# Patient Record
Sex: MALE | Race: WHITE | NOT HISPANIC OR LATINO | Employment: OTHER | URBAN - METROPOLITAN AREA
[De-identification: names, ages, dates, MRNs, and addresses within clinical notes are randomized per-mention and may not be internally consistent; named-entity substitution may affect disease eponyms.]

---

## 2024-01-01 ENCOUNTER — APPOINTMENT (OUTPATIENT)
Dept: CARDIOLOGY | Facility: HOSPITAL | Age: 89
DRG: 871 | End: 2024-01-01
Payer: MEDICARE

## 2024-01-01 ENCOUNTER — APPOINTMENT (OUTPATIENT)
Dept: RADIOLOGY | Facility: HOSPITAL | Age: 89
DRG: 178 | End: 2024-01-01
Payer: MEDICARE

## 2024-01-01 ENCOUNTER — HOSPITAL ENCOUNTER (INPATIENT)
Facility: HOSPITAL | Age: 89
LOS: 5 days | DRG: 871 | End: 2024-06-03
Attending: STUDENT IN AN ORGANIZED HEALTH CARE EDUCATION/TRAINING PROGRAM | Admitting: INTERNAL MEDICINE
Payer: MEDICARE

## 2024-01-01 ENCOUNTER — APPOINTMENT (OUTPATIENT)
Dept: RADIOLOGY | Facility: HOSPITAL | Age: 89
DRG: 871 | End: 2024-01-01
Payer: MEDICARE

## 2024-01-01 ENCOUNTER — HOSPITAL ENCOUNTER (INPATIENT)
Facility: HOSPITAL | Age: 89
LOS: 7 days | Discharge: HOME | DRG: 178 | End: 2024-05-14
Attending: STUDENT IN AN ORGANIZED HEALTH CARE EDUCATION/TRAINING PROGRAM | Admitting: INTERNAL MEDICINE
Payer: MEDICARE

## 2024-01-01 VITALS
RESPIRATION RATE: 40 BRPM | WEIGHT: 175.49 LBS | DIASTOLIC BLOOD PRESSURE: 30 MMHG | HEART RATE: 55 BPM | SYSTOLIC BLOOD PRESSURE: 67 MMHG | BODY MASS INDEX: 25.12 KG/M2 | HEIGHT: 70 IN | TEMPERATURE: 97.9 F | OXYGEN SATURATION: 85 %

## 2024-01-01 VITALS
TEMPERATURE: 97.5 F | HEART RATE: 81 BPM | RESPIRATION RATE: 16 BRPM | OXYGEN SATURATION: 93 % | DIASTOLIC BLOOD PRESSURE: 66 MMHG | WEIGHT: 180 LBS | SYSTOLIC BLOOD PRESSURE: 139 MMHG | HEIGHT: 70 IN | BODY MASS INDEX: 25.77 KG/M2
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
## 2024-01-01 DIAGNOSIS — J94.2 HEMOTHORAX ON LEFT: ICD-10-CM

## 2024-01-01 DIAGNOSIS — J18.9 PNEUMONIA OF BOTH LUNGS DUE TO INFECTIOUS ORGANISM, UNSPECIFIED PART OF LUNG: ICD-10-CM

## 2024-01-01 DIAGNOSIS — E87.1 HYPONATREMIA: ICD-10-CM

## 2024-01-01 DIAGNOSIS — A41.9 SEPSIS, DUE TO UNSPECIFIED ORGANISM, UNSPECIFIED WHETHER ACUTE ORGAN DYSFUNCTION PRESENT (MULTI): ICD-10-CM

## 2024-01-01 DIAGNOSIS — J96.01 ACUTE HYPOXIC RESPIRATORY FAILURE (MULTI): ICD-10-CM

## 2024-01-01 DIAGNOSIS — J18.9 PNEUMONIA DUE TO INFECTIOUS ORGANISM, UNSPECIFIED LATERALITY, UNSPECIFIED PART OF LUNG: ICD-10-CM

## 2024-01-01 DIAGNOSIS — R06.02 SHORTNESS OF BREATH: Primary | ICD-10-CM

## 2024-01-01 DIAGNOSIS — E22.2: Primary | ICD-10-CM

## 2024-01-01 LAB
ABO GROUP (TYPE) IN BLOOD: NORMAL
ABO GROUP (TYPE) IN BLOOD: NORMAL
ADENOVIRUS RVP, VIRC: NOT DETECTED
ALBUMIN SERPL BCP-MCNC: 2.7 G/DL (ref 3.4–5)
ALBUMIN SERPL BCP-MCNC: 2.7 G/DL (ref 3.4–5)
ALBUMIN SERPL BCP-MCNC: 2.8 G/DL (ref 3.4–5)
ALBUMIN SERPL BCP-MCNC: 2.9 G/DL (ref 3.4–5)
ALBUMIN SERPL BCP-MCNC: 3.1 G/DL (ref 3.4–5)
ALBUMIN SERPL BCP-MCNC: 3.2 G/DL (ref 3.4–5)
ALBUMIN SERPL BCP-MCNC: 3.3 G/DL (ref 3.4–5)
ALBUMIN SERPL BCP-MCNC: 3.4 G/DL (ref 3.4–5)
ALBUMIN SERPL BCP-MCNC: 3.6 G/DL (ref 3.4–5)
ALBUMIN: 3.3 G/DL (ref 3.4–5)
ALP SERPL-CCNC: 100 U/L (ref 33–136)
ALP SERPL-CCNC: 101 U/L (ref 33–136)
ALP SERPL-CCNC: 103 U/L (ref 33–136)
ALP SERPL-CCNC: 90 U/L (ref 33–136)
ALP SERPL-CCNC: 91 U/L (ref 33–136)
ALP SERPL-CCNC: 92 U/L (ref 33–136)
ALP SERPL-CCNC: 96 U/L (ref 33–136)
ALP SERPL-CCNC: 97 U/L (ref 33–136)
ALP SERPL-CCNC: 99 U/L (ref 33–136)
ALPHA 1 GLOBULIN: 0.5 G/DL (ref 0.2–0.6)
ALPHA 2 GLOBULIN: 1.2 G/DL (ref 0.4–1.1)
ALT SERPL W P-5'-P-CCNC: 14 U/L (ref 10–52)
ALT SERPL W P-5'-P-CCNC: 16 U/L (ref 10–52)
ALT SERPL W P-5'-P-CCNC: 20 U/L (ref 10–52)
ALT SERPL W P-5'-P-CCNC: 22 U/L (ref 10–52)
ALT SERPL W P-5'-P-CCNC: 25 U/L (ref 10–52)
ALT SERPL W P-5'-P-CCNC: 27 U/L (ref 10–52)
ALT SERPL W P-5'-P-CCNC: 28 U/L (ref 10–52)
ALT SERPL W P-5'-P-CCNC: 29 U/L (ref 10–52)
ALT SERPL W P-5'-P-CCNC: 29 U/L (ref 10–52)
ANION GAP BLDA CALCULATED.4IONS-SCNC: 29 MMO/L (ref 10–25)
ANION GAP SERPL CALC-SCNC: 13 MMOL/L (ref 10–20)
ANION GAP SERPL CALC-SCNC: 15 MMOL/L (ref 10–20)
ANION GAP SERPL CALC-SCNC: 16 MMOL/L (ref 10–20)
ANION GAP SERPL CALC-SCNC: 17 MMOL/L (ref 10–20)
ANION GAP SERPL CALC-SCNC: 18 MMOL/L (ref 10–20)
ANION GAP SERPL CALC-SCNC: 20 MMOL/L (ref 10–20)
ANION GAP SERPL CALC-SCNC: 26 MMOL/L (ref 10–20)
ANTIBODY SCREEN: NORMAL
ANTIBODY SCREEN: NORMAL
APPARATUS: ABNORMAL
APPEARANCE UR: CLEAR
APPEARANCE UR: CLEAR
APTT PPP: 26 SECONDS (ref 27–38)
ARTERIAL PATENCY WRIST A: POSITIVE
AST SERPL W P-5'-P-CCNC: 35 U/L (ref 9–39)
AST SERPL W P-5'-P-CCNC: 38 U/L (ref 9–39)
AST SERPL W P-5'-P-CCNC: 39 U/L (ref 9–39)
AST SERPL W P-5'-P-CCNC: 41 U/L (ref 9–39)
AST SERPL W P-5'-P-CCNC: 41 U/L (ref 9–39)
AST SERPL W P-5'-P-CCNC: 43 U/L (ref 9–39)
AST SERPL W P-5'-P-CCNC: 43 U/L (ref 9–39)
AST SERPL W P-5'-P-CCNC: 45 U/L (ref 9–39)
AST SERPL W P-5'-P-CCNC: 46 U/L (ref 9–39)
BACTERIA BLD CULT: NORMAL
BACTERIA BLD CULT: NORMAL
BACTERIA FLD CULT: NORMAL
BASE EXCESS BLDA CALC-SCNC: -7.1 MMOL/L (ref -2–3)
BASOPHILS # BLD AUTO: 0.05 X10*3/UL (ref 0–0.1)
BASOPHILS # BLD MANUAL: 0 X10*3/UL (ref 0–0.1)
BASOPHILS # BLD MANUAL: 0 X10*3/UL (ref 0–0.1)
BASOPHILS NFR BLD AUTO: 0.5 %
BASOPHILS NFR BLD MANUAL: 0 %
BASOPHILS NFR BLD MANUAL: 0 %
BASOPHILS NFR FLD MANUAL: 0 %
BETA GLOBULIN: 0.7 G/DL (ref 0.5–1.2)
BILIRUB SERPL-MCNC: 0.4 MG/DL (ref 0–1.2)
BILIRUB SERPL-MCNC: 0.5 MG/DL (ref 0–1.2)
BILIRUB SERPL-MCNC: 0.6 MG/DL (ref 0–1.2)
BILIRUB SERPL-MCNC: 0.6 MG/DL (ref 0–1.2)
BILIRUB SERPL-MCNC: 0.7 MG/DL (ref 0–1.2)
BILIRUB SERPL-MCNC: 0.8 MG/DL (ref 0–1.2)
BILIRUB SERPL-MCNC: 0.9 MG/DL (ref 0–1.2)
BILIRUB SERPL-MCNC: 0.9 MG/DL (ref 0–1.2)
BILIRUB SERPL-MCNC: 1 MG/DL (ref 0–1.2)
BILIRUB UR STRIP.AUTO-MCNC: NEGATIVE MG/DL
BILIRUB UR STRIP.AUTO-MCNC: NEGATIVE MG/DL
BLASTS NFR FLD MANUAL: 0 %
BLOOD EXPIRATION DATE: NORMAL
BNP SERPL-MCNC: 73 PG/ML (ref 0–99)
BODY TEMPERATURE: 37 DEGREES CELSIUS
BUN SERPL-MCNC: 19 MG/DL (ref 6–23)
BUN SERPL-MCNC: 21 MG/DL (ref 6–23)
BUN SERPL-MCNC: 24 MG/DL (ref 6–23)
BUN SERPL-MCNC: 24 MG/DL (ref 6–23)
BUN SERPL-MCNC: 28 MG/DL (ref 6–23)
BUN SERPL-MCNC: 30 MG/DL (ref 6–23)
BUN SERPL-MCNC: 33 MG/DL (ref 6–23)
BUN SERPL-MCNC: 75 MG/DL (ref 6–23)
BUN SERPL-MCNC: 76 MG/DL (ref 6–23)
BUN SERPL-MCNC: 81 MG/DL (ref 6–23)
BUN SERPL-MCNC: 81 MG/DL (ref 6–23)
BUN SERPL-MCNC: 84 MG/DL (ref 6–23)
BUN SERPL-MCNC: 87 MG/DL (ref 6–23)
BUN SERPL-MCNC: 92 MG/DL (ref 6–23)
BURR CELLS BLD QL SMEAR: ABNORMAL
BURR CELLS BLD QL SMEAR: ABNORMAL
CA-I BLDA-SCNC: 1.24 MMOL/L (ref 1.1–1.33)
CALCIUM SERPL-MCNC: 10 MG/DL (ref 8.6–10.3)
CALCIUM SERPL-MCNC: 8.5 MG/DL (ref 8.6–10.3)
CALCIUM SERPL-MCNC: 8.6 MG/DL (ref 8.6–10.3)
CALCIUM SERPL-MCNC: 8.8 MG/DL (ref 8.6–10.3)
CALCIUM SERPL-MCNC: 8.9 MG/DL (ref 8.6–10.3)
CALCIUM SERPL-MCNC: 9 MG/DL (ref 8.6–10.3)
CALCIUM SERPL-MCNC: 9.2 MG/DL (ref 8.6–10.3)
CALCIUM SERPL-MCNC: 9.3 MG/DL (ref 8.6–10.3)
CALCIUM SERPL-MCNC: 9.6 MG/DL (ref 8.6–10.3)
CALCIUM SERPL-MCNC: 9.7 MG/DL (ref 8.6–10.3)
CARDIAC TROPONIN I PNL SERPL HS: 53 NG/L (ref 0–20)
CARDIAC TROPONIN I PNL SERPL HS: 61 NG/L (ref 0–20)
CARDIAC TROPONIN I PNL SERPL HS: 69 NG/L (ref 0–20)
CHLORIDE BLDA-SCNC: 102 MMOL/L (ref 98–107)
CHLORIDE SERPL-SCNC: 100 MMOL/L (ref 98–107)
CHLORIDE SERPL-SCNC: 100 MMOL/L (ref 98–107)
CHLORIDE SERPL-SCNC: 101 MMOL/L (ref 98–107)
CHLORIDE SERPL-SCNC: 104 MMOL/L (ref 98–107)
CHLORIDE SERPL-SCNC: 105 MMOL/L (ref 98–107)
CHLORIDE SERPL-SCNC: 106 MMOL/L (ref 98–107)
CHLORIDE SERPL-SCNC: 107 MMOL/L (ref 98–107)
CHLORIDE SERPL-SCNC: 108 MMOL/L (ref 98–107)
CHLORIDE SERPL-SCNC: 109 MMOL/L (ref 98–107)
CHLORIDE SERPL-SCNC: 110 MMOL/L (ref 98–107)
CHLORIDE SERPL-SCNC: 112 MMOL/L (ref 98–107)
CHLORIDE SERPL-SCNC: 114 MMOL/L (ref 98–107)
CHLORIDE SERPL-SCNC: 116 MMOL/L (ref 98–107)
CHLORIDE SERPL-SCNC: 96 MMOL/L (ref 98–107)
CHLORIDE SERPL-SCNC: 96 MMOL/L (ref 98–107)
CHLORIDE SERPL-SCNC: 98 MMOL/L (ref 98–107)
CHLORIDE UR-SCNC: 52 MMOL/L
CHLORIDE UR-SCNC: <15 MMOL/L
CHLORIDE/CREATININE (MMOL/G) IN URINE: 81 MMOL/G CREAT (ref 23–275)
CHLORIDE/CREATININE (MMOL/G) IN URINE: NORMAL
CLARITY FLD: ABNORMAL
CO2 SERPL-SCNC: 18 MMOL/L (ref 21–32)
CO2 SERPL-SCNC: 18 MMOL/L (ref 21–32)
CO2 SERPL-SCNC: 19 MMOL/L (ref 21–32)
CO2 SERPL-SCNC: 20 MMOL/L (ref 21–32)
CO2 SERPL-SCNC: 20 MMOL/L (ref 21–32)
CO2 SERPL-SCNC: 21 MMOL/L (ref 21–32)
CO2 SERPL-SCNC: 22 MMOL/L (ref 21–32)
CO2 SERPL-SCNC: 23 MMOL/L (ref 21–32)
COLOR FLD: ABNORMAL
COLOR UR: ABNORMAL
COLOR UR: YELLOW
CORTIS AM PEAK SERPL-MSCNC: 29.9 UG/DL (ref 5–20)
CREAT SERPL-MCNC: 0.61 MG/DL (ref 0.5–1.3)
CREAT SERPL-MCNC: 0.62 MG/DL (ref 0.5–1.3)
CREAT SERPL-MCNC: 0.71 MG/DL (ref 0.5–1.3)
CREAT SERPL-MCNC: 0.73 MG/DL (ref 0.5–1.3)
CREAT SERPL-MCNC: 0.74 MG/DL (ref 0.5–1.3)
CREAT SERPL-MCNC: 0.79 MG/DL (ref 0.5–1.3)
CREAT SERPL-MCNC: 0.83 MG/DL (ref 0.5–1.3)
CREAT SERPL-MCNC: 0.93 MG/DL (ref 0.5–1.3)
CREAT SERPL-MCNC: 0.94 MG/DL (ref 0.5–1.3)
CREAT SERPL-MCNC: 1.21 MG/DL (ref 0.5–1.3)
CREAT SERPL-MCNC: 1.42 MG/DL (ref 0.5–1.3)
CREAT SERPL-MCNC: 1.43 MG/DL (ref 0.5–1.3)
CREAT SERPL-MCNC: 1.45 MG/DL (ref 0.5–1.3)
CREAT SERPL-MCNC: 1.59 MG/DL (ref 0.5–1.3)
CREAT SERPL-MCNC: 1.65 MG/DL (ref 0.5–1.3)
CREAT SERPL-MCNC: 1.65 MG/DL (ref 0.5–1.3)
CREAT UR-MCNC: 64.5 MG/DL (ref 20–370)
CREAT UR-MCNC: 64.5 MG/DL (ref 20–370)
CREAT UR-MCNC: 91.8 MG/DL (ref 20–370)
CRITICAL CALL TIME: 923
CRITICAL CALLED BY: ABNORMAL
CRITICAL CALLED TO: ABNORMAL
CRITICAL READ BACK: ABNORMAL
DACRYOCYTES BLD QL SMEAR: ABNORMAL
DISPENSE STATUS: NORMAL
EGFRCR SERPLBLD CKD-EPI 2021: 39 ML/MIN/1.73M*2
EGFRCR SERPLBLD CKD-EPI 2021: 39 ML/MIN/1.73M*2
EGFRCR SERPLBLD CKD-EPI 2021: 41 ML/MIN/1.73M*2
EGFRCR SERPLBLD CKD-EPI 2021: 46 ML/MIN/1.73M*2
EGFRCR SERPLBLD CKD-EPI 2021: 47 ML/MIN/1.73M*2
EGFRCR SERPLBLD CKD-EPI 2021: 47 ML/MIN/1.73M*2
EGFRCR SERPLBLD CKD-EPI 2021: 57 ML/MIN/1.73M*2
EGFRCR SERPLBLD CKD-EPI 2021: 77 ML/MIN/1.73M*2
EGFRCR SERPLBLD CKD-EPI 2021: 78 ML/MIN/1.73M*2
EGFRCR SERPLBLD CKD-EPI 2021: 84 ML/MIN/1.73M*2
EGFRCR SERPLBLD CKD-EPI 2021: 85 ML/MIN/1.73M*2
EGFRCR SERPLBLD CKD-EPI 2021: 87 ML/MIN/1.73M*2
EGFRCR SERPLBLD CKD-EPI 2021: 87 ML/MIN/1.73M*2
EGFRCR SERPLBLD CKD-EPI 2021: 88 ML/MIN/1.73M*2
EGFRCR SERPLBLD CKD-EPI 2021: >90 ML/MIN/1.73M*2
EGFRCR SERPLBLD CKD-EPI 2021: >90 ML/MIN/1.73M*2
ENTEROVIRUS/RHINOVIRUS RVP, VIRC: POSITIVE
EOSINOPHIL # BLD AUTO: 0.04 X10*3/UL (ref 0–0.4)
EOSINOPHIL # BLD MANUAL: 0 X10*3/UL (ref 0–0.4)
EOSINOPHIL # BLD MANUAL: 0.11 X10*3/UL (ref 0–0.4)
EOSINOPHIL NFR BLD AUTO: 0.4 %
EOSINOPHIL NFR BLD MANUAL: 0 %
EOSINOPHIL NFR BLD MANUAL: 1 %
EOSINOPHIL NFR FLD MANUAL: 0 %
ERYTHROCYTE [DISTWIDTH] IN BLOOD BY AUTOMATED COUNT: 13.2 % (ref 11.5–14.5)
ERYTHROCYTE [DISTWIDTH] IN BLOOD BY AUTOMATED COUNT: 13.3 % (ref 11.5–14.5)
ERYTHROCYTE [DISTWIDTH] IN BLOOD BY AUTOMATED COUNT: 13.3 % (ref 11.5–14.5)
ERYTHROCYTE [DISTWIDTH] IN BLOOD BY AUTOMATED COUNT: 13.4 % (ref 11.5–14.5)
ERYTHROCYTE [DISTWIDTH] IN BLOOD BY AUTOMATED COUNT: 13.5 % (ref 11.5–14.5)
ERYTHROCYTE [DISTWIDTH] IN BLOOD BY AUTOMATED COUNT: 13.6 % (ref 11.5–14.5)
ERYTHROCYTE [DISTWIDTH] IN BLOOD BY AUTOMATED COUNT: 13.7 % (ref 11.5–14.5)
ERYTHROCYTE [DISTWIDTH] IN BLOOD BY AUTOMATED COUNT: 14.4 % (ref 11.5–14.5)
ERYTHROCYTE [DISTWIDTH] IN BLOOD BY AUTOMATED COUNT: 14.6 % (ref 11.5–14.5)
ERYTHROCYTE [DISTWIDTH] IN BLOOD BY AUTOMATED COUNT: 14.7 % (ref 11.5–14.5)
ERYTHROCYTE [DISTWIDTH] IN BLOOD BY AUTOMATED COUNT: 14.7 % (ref 11.5–14.5)
ERYTHROCYTE [DISTWIDTH] IN BLOOD BY AUTOMATED COUNT: 14.8 % (ref 11.5–14.5)
ERYTHROCYTE [DISTWIDTH] IN BLOOD BY AUTOMATED COUNT: 15.3 % (ref 11.5–14.5)
ERYTHROCYTE [DISTWIDTH] IN BLOOD BY AUTOMATED COUNT: 16.5 % (ref 11.5–14.5)
FLOW: 60 LPM
GAMMA GLOBULIN: 1 G/DL (ref 0.5–1.4)
GLUCOSE BLD MANUAL STRIP-MCNC: 152 MG/DL (ref 74–99)
GLUCOSE BLD MANUAL STRIP-MCNC: 155 MG/DL (ref 74–99)
GLUCOSE BLD MANUAL STRIP-MCNC: 157 MG/DL (ref 74–99)
GLUCOSE BLD MANUAL STRIP-MCNC: 160 MG/DL (ref 74–99)
GLUCOSE BLD MANUAL STRIP-MCNC: 163 MG/DL (ref 74–99)
GLUCOSE BLD MANUAL STRIP-MCNC: 165 MG/DL (ref 74–99)
GLUCOSE BLD MANUAL STRIP-MCNC: 174 MG/DL (ref 74–99)
GLUCOSE BLD MANUAL STRIP-MCNC: 177 MG/DL (ref 74–99)
GLUCOSE BLD MANUAL STRIP-MCNC: 181 MG/DL (ref 74–99)
GLUCOSE BLD MANUAL STRIP-MCNC: 192 MG/DL (ref 74–99)
GLUCOSE BLD MANUAL STRIP-MCNC: 193 MG/DL (ref 74–99)
GLUCOSE BLD MANUAL STRIP-MCNC: 198 MG/DL (ref 74–99)
GLUCOSE BLD MANUAL STRIP-MCNC: 200 MG/DL (ref 74–99)
GLUCOSE BLD MANUAL STRIP-MCNC: 209 MG/DL (ref 74–99)
GLUCOSE BLD MANUAL STRIP-MCNC: 257 MG/DL (ref 74–99)
GLUCOSE BLDA-MCNC: 201 MG/DL (ref 74–99)
GLUCOSE SERPL-MCNC: 106 MG/DL (ref 74–99)
GLUCOSE SERPL-MCNC: 115 MG/DL (ref 74–99)
GLUCOSE SERPL-MCNC: 130 MG/DL (ref 74–99)
GLUCOSE SERPL-MCNC: 137 MG/DL (ref 74–99)
GLUCOSE SERPL-MCNC: 138 MG/DL (ref 74–99)
GLUCOSE SERPL-MCNC: 162 MG/DL (ref 74–99)
GLUCOSE SERPL-MCNC: 172 MG/DL (ref 74–99)
GLUCOSE SERPL-MCNC: 176 MG/DL (ref 74–99)
GLUCOSE SERPL-MCNC: 187 MG/DL (ref 74–99)
GLUCOSE SERPL-MCNC: 190 MG/DL (ref 74–99)
GLUCOSE SERPL-MCNC: 197 MG/DL (ref 74–99)
GLUCOSE SERPL-MCNC: 208 MG/DL (ref 74–99)
GLUCOSE SERPL-MCNC: 237 MG/DL (ref 74–99)
GLUCOSE SERPL-MCNC: 240 MG/DL (ref 74–99)
GLUCOSE SERPL-MCNC: 80 MG/DL (ref 74–99)
GLUCOSE SERPL-MCNC: 90 MG/DL (ref 74–99)
GLUCOSE UR STRIP.AUTO-MCNC: NEGATIVE MG/DL
GLUCOSE UR STRIP.AUTO-MCNC: NORMAL MG/DL
GRAM STN SPEC: NORMAL
GRAM STN SPEC: NORMAL
HCO3 BLDA-SCNC: 14.9 MMOL/L (ref 22–26)
HCT VFR BLD AUTO: 23.7 % (ref 41–52)
HCT VFR BLD AUTO: 28.1 % (ref 41–52)
HCT VFR BLD AUTO: 28.7 % (ref 41–52)
HCT VFR BLD AUTO: 28.9 % (ref 41–52)
HCT VFR BLD AUTO: 29.1 % (ref 41–52)
HCT VFR BLD AUTO: 29.3 % (ref 41–52)
HCT VFR BLD AUTO: 32.2 % (ref 41–52)
HCT VFR BLD AUTO: 32.8 % (ref 41–52)
HCT VFR BLD AUTO: 32.9 % (ref 41–52)
HCT VFR BLD AUTO: 33.2 % (ref 41–52)
HCT VFR BLD AUTO: 33.3 % (ref 41–52)
HCT VFR BLD AUTO: 33.4 % (ref 41–52)
HCT VFR BLD AUTO: 34.4 % (ref 41–52)
HCT VFR BLD AUTO: 34.5 % (ref 41–52)
HCT VFR BLD AUTO: 34.7 % (ref 41–52)
HCT VFR BLD AUTO: 35.2 % (ref 41–52)
HCT VFR BLD EST: 29 % (ref 41–52)
HGB BLD-MCNC: 11.2 G/DL (ref 13.5–17.5)
HGB BLD-MCNC: 11.3 G/DL (ref 13.5–17.5)
HGB BLD-MCNC: 11.3 G/DL (ref 13.5–17.5)
HGB BLD-MCNC: 11.4 G/DL (ref 13.5–17.5)
HGB BLD-MCNC: 11.6 G/DL (ref 13.5–17.5)
HGB BLD-MCNC: 11.7 G/DL (ref 13.5–17.5)
HGB BLD-MCNC: 11.9 G/DL (ref 13.5–17.5)
HGB BLD-MCNC: 11.9 G/DL (ref 13.5–17.5)
HGB BLD-MCNC: 12 G/DL (ref 13.5–17.5)
HGB BLD-MCNC: 7.9 G/DL (ref 13.5–17.5)
HGB BLD-MCNC: 8.9 G/DL (ref 13.5–17.5)
HGB BLD-MCNC: 9.1 G/DL (ref 13.5–17.5)
HGB BLD-MCNC: 9.3 G/DL (ref 13.5–17.5)
HGB BLD-MCNC: 9.4 G/DL (ref 13.5–17.5)
HGB BLD-MCNC: 9.7 G/DL (ref 13.5–17.5)
HGB BLD-MCNC: 9.7 G/DL (ref 13.5–17.5)
HGB BLDA-MCNC: 9.5 G/DL (ref 13.5–17.5)
HOLD SPECIMEN: NORMAL
HUMAN BOCAVIRUS RVP, VIRC: NOT DETECTED
HUMAN CORONAVIRUS RVP, VIRC: NOT DETECTED
HYALINE CASTS #/AREA URNS AUTO: ABNORMAL /LPF
HYALINE CASTS #/AREA URNS AUTO: ABNORMAL /LPF
IMM GRANULOCYTES # BLD AUTO: 0.49 X10*3/UL (ref 0–0.5)
IMM GRANULOCYTES # BLD AUTO: 0.54 X10*3/UL (ref 0–0.5)
IMM GRANULOCYTES # BLD AUTO: 0.72 X10*3/UL (ref 0–0.5)
IMM GRANULOCYTES NFR BLD AUTO: 4.5 % (ref 0–0.9)
IMM GRANULOCYTES NFR BLD AUTO: 4.9 % (ref 0–0.9)
IMM GRANULOCYTES NFR BLD AUTO: 5 % (ref 0–0.9)
IMMATURE GRANULOCYTES IN FLUID: 0 %
IMMUNOFIXATION COMMENT: ABNORMAL
INFLUENZA A , VIRC: NOT DETECTED
INFLUENZA A H1N1-09 , VIRC: NOT DETECTED
INFLUENZA B PCR, VIRC: NOT DETECTED
INHALED O2 CONCENTRATION: 50 %
INR PPP: 1.4 (ref 0.9–1.1)
INR PPP: 1.8 (ref 0.9–1.1)
INR PPP: 2.1 (ref 0.9–1.1)
INR PPP: 3.2 (ref 0.9–1.1)
KAPPA LC SERPL-MCNC: 0.61 MG/DL (ref 0.33–1.94)
KAPPA LC/LAMBDA SER: 1.45 {RATIO} (ref 0.26–1.65)
KETONES UR STRIP.AUTO-MCNC: ABNORMAL MG/DL
KETONES UR STRIP.AUTO-MCNC: NEGATIVE MG/DL
LACTATE BLDA-SCNC: 11.2 MMOL/L (ref 0.4–2)
LACTATE SERPL-SCNC: 3.3 MMOL/L (ref 0.4–2)
LACTATE SERPL-SCNC: 3.3 MMOL/L (ref 0.4–2)
LACTATE SERPL-SCNC: 3.4 MMOL/L (ref 0.4–2)
LACTATE SERPL-SCNC: 3.4 MMOL/L (ref 0.4–2)
LACTATE SERPL-SCNC: 3.6 MMOL/L (ref 0.4–2)
LACTATE SERPL-SCNC: 3.7 MMOL/L (ref 0.4–2)
LACTATE SERPL-SCNC: 3.7 MMOL/L (ref 0.4–2)
LACTATE SERPL-SCNC: 3.8 MMOL/L (ref 0.4–2)
LACTATE SERPL-SCNC: 4 MMOL/L (ref 0.4–2)
LACTATE SERPL-SCNC: 4 MMOL/L (ref 0.4–2)
LACTATE SERPL-SCNC: 4.1 MMOL/L (ref 0.4–2)
LACTATE SERPL-SCNC: 4.1 MMOL/L (ref 0.4–2)
LACTATE SERPL-SCNC: 4.6 MMOL/L (ref 0.4–2)
LACTATE SERPL-SCNC: 4.6 MMOL/L (ref 0.4–2)
LACTATE SERPL-SCNC: 5.5 MMOL/L (ref 0.4–2)
LACTATE SERPL-SCNC: 6.3 MMOL/L (ref 0.4–2)
LACTATE SERPL-SCNC: 7.9 MMOL/L (ref 0.4–2)
LACTATE SERPL-SCNC: 9.5 MMOL/L (ref 0.4–2)
LAMBDA LC SERPL-MCNC: 0.42 MG/DL (ref 0.57–2.63)
LDH FLD L TO P-CCNC: 2038 U/L
LDH SERPL L TO P-CCNC: 888 U/L (ref 84–246)
LEGIONELLA AG UR QL: NEGATIVE
LEGIONELLA AG UR QL: NEGATIVE
LEUKOCYTE ESTERASE UR QL STRIP.AUTO: NEGATIVE
LEUKOCYTE ESTERASE UR QL STRIP.AUTO: NEGATIVE
LYMPHOCYTES # BLD AUTO: 1.43 X10*3/UL (ref 0.8–3)
LYMPHOCYTES # BLD MANUAL: 1.51 X10*3/UL (ref 0.8–3)
LYMPHOCYTES # BLD MANUAL: 1.9 X10*3/UL (ref 0.8–3)
LYMPHOCYTES NFR BLD AUTO: 13.2 %
LYMPHOCYTES NFR BLD MANUAL: 13 %
LYMPHOCYTES NFR BLD MANUAL: 14 %
LYMPHOCYTES NFR FLD MANUAL: 15 %
M PNEUMO IGM SER IA-ACNC: 0 U/L
M-PROTEIN 1: 0.5 G/DL
MAGNESIUM SERPL-MCNC: 3.2 MG/DL (ref 1.6–2.4)
MAGNESIUM SERPL-MCNC: 3.23 MG/DL (ref 1.6–2.4)
MAGNESIUM SERPL-MCNC: 3.27 MG/DL (ref 1.6–2.4)
MAGNESIUM SERPL-MCNC: 3.49 MG/DL (ref 1.6–2.4)
MCH RBC QN AUTO: 30.9 PG (ref 26–34)
MCH RBC QN AUTO: 31.3 PG (ref 26–34)
MCH RBC QN AUTO: 31.5 PG (ref 26–34)
MCH RBC QN AUTO: 31.6 PG (ref 26–34)
MCH RBC QN AUTO: 31.7 PG (ref 26–34)
MCH RBC QN AUTO: 31.8 PG (ref 26–34)
MCH RBC QN AUTO: 32.5 PG (ref 26–34)
MCH RBC QN AUTO: 32.6 PG (ref 26–34)
MCH RBC QN AUTO: 32.9 PG (ref 26–34)
MCH RBC QN AUTO: 33 PG (ref 26–34)
MCHC RBC AUTO-ENTMCNC: 29.6 G/DL (ref 32–36)
MCHC RBC AUTO-ENTMCNC: 31.3 G/DL (ref 32–36)
MCHC RBC AUTO-ENTMCNC: 31.7 G/DL (ref 32–36)
MCHC RBC AUTO-ENTMCNC: 31.7 G/DL (ref 32–36)
MCHC RBC AUTO-ENTMCNC: 32.8 G/DL (ref 32–36)
MCHC RBC AUTO-ENTMCNC: 32.9 G/DL (ref 32–36)
MCHC RBC AUTO-ENTMCNC: 33.3 G/DL (ref 32–36)
MCHC RBC AUTO-ENTMCNC: 33.6 G/DL (ref 32–36)
MCHC RBC AUTO-ENTMCNC: 33.8 G/DL (ref 32–36)
MCHC RBC AUTO-ENTMCNC: 33.8 G/DL (ref 32–36)
MCHC RBC AUTO-ENTMCNC: 34 G/DL (ref 32–36)
MCHC RBC AUTO-ENTMCNC: 34 G/DL (ref 32–36)
MCHC RBC AUTO-ENTMCNC: 34.8 G/DL (ref 32–36)
MCHC RBC AUTO-ENTMCNC: 34.8 G/DL (ref 32–36)
MCHC RBC AUTO-ENTMCNC: 35.1 G/DL (ref 32–36)
MCHC RBC AUTO-ENTMCNC: 35.8 G/DL (ref 32–36)
MCV RBC AUTO: 100 FL (ref 80–100)
MCV RBC AUTO: 108 FL (ref 80–100)
MCV RBC AUTO: 89 FL (ref 80–100)
MCV RBC AUTO: 90 FL (ref 80–100)
MCV RBC AUTO: 91 FL (ref 80–100)
MCV RBC AUTO: 91 FL (ref 80–100)
MCV RBC AUTO: 92 FL (ref 80–100)
MCV RBC AUTO: 93 FL (ref 80–100)
MCV RBC AUTO: 93 FL (ref 80–100)
MCV RBC AUTO: 94 FL (ref 80–100)
MCV RBC AUTO: 96 FL (ref 80–100)
MCV RBC AUTO: 98 FL (ref 80–100)
MCV RBC AUTO: 98 FL (ref 80–100)
METAMYELOCYTES # BLD MANUAL: 0.32 X10*3/UL
METAMYELOCYTES # BLD MANUAL: 0.58 X10*3/UL
METAMYELOCYTES NFR BLD MANUAL: 3 %
METAMYELOCYTES NFR BLD MANUAL: 4 %
METAPNEUMOVIRUS , VIRC: NOT DETECTED
MICROALBUMIN UR-MCNC: 7.7 MG/L
MICROALBUMIN/CREAT UR: 11.9 UG/MG CREAT
MONOCYTES # BLD AUTO: 0.94 X10*3/UL (ref 0.05–0.8)
MONOCYTES # BLD MANUAL: 0.29 X10*3/UL (ref 0.05–0.8)
MONOCYTES # BLD MANUAL: 0.43 X10*3/UL (ref 0.05–0.8)
MONOCYTES NFR BLD AUTO: 8.6 %
MONOCYTES NFR BLD MANUAL: 2 %
MONOCYTES NFR BLD MANUAL: 4 %
MONOS+MACROS NFR FLD MANUAL: 38 %
MRSA DNA SPEC QL NAA+PROBE: NOT DETECTED
MUCOUS THREADS #/AREA URNS AUTO: ABNORMAL /LPF
MUCOUS THREADS #/AREA URNS AUTO: ABNORMAL /LPF
MYELOCYTES # BLD MANUAL: 0.15 X10*3/UL
MYELOCYTES # BLD MANUAL: 0.22 X10*3/UL
MYELOCYTES NFR BLD MANUAL: 1 %
MYELOCYTES NFR BLD MANUAL: 2 %
MYOGLOBIN SERPL-MCNC: 44 UG/L
NEUTROPHILS # BLD AUTO: 7.92 X10*3/UL (ref 1.6–5.5)
NEUTROPHILS # BLD MANUAL: 11.68 X10*3/UL (ref 1.6–5.5)
NEUTROPHILS # BLD MANUAL: 8.1 X10*3/UL (ref 1.6–5.5)
NEUTROPHILS NFR BLD AUTO: 72.8 %
NEUTROPHILS NFR FLD MANUAL: 47 %
NEUTS BAND # BLD MANUAL: 1.17 X10*3/UL (ref 0–0.5)
NEUTS BAND # BLD MANUAL: 1.51 X10*3/UL (ref 0–0.5)
NEUTS BAND NFR BLD MANUAL: 14 %
NEUTS BAND NFR BLD MANUAL: 8 %
NEUTS SEG # BLD MANUAL: 10.51 X10*3/UL (ref 1.6–5)
NEUTS SEG # BLD MANUAL: 6.59 X10*3/UL (ref 1.6–5)
NEUTS SEG NFR BLD MANUAL: 61 %
NEUTS SEG NFR BLD MANUAL: 72 %
NITRITE UR QL STRIP.AUTO: NEGATIVE
NITRITE UR QL STRIP.AUTO: NEGATIVE
NRBC BLD-RTO: 0 /100 WBCS (ref 0–0)
NRBC BLD-RTO: 0.2 /100 WBCS (ref 0–0)
NRBC BLD-RTO: 12.3 /100 WBCS (ref 0–0)
NRBC BLD-RTO: 23.1 /100 WBCS (ref 0–0)
NRBC BLD-RTO: 3.5 /100 WBCS (ref 0–0)
NRBC BLD-RTO: 4.3 /100 WBCS (ref 0–0)
NRBC BLD-RTO: 4.6 /100 WBCS (ref 0–0)
NRBC BLD-RTO: 7.1 /100 WBCS (ref 0–0)
NRBC BLD-RTO: 7.4 /100 WBCS (ref 0–0)
OSMOLALITY UR: 641 MOSM/KG (ref 200–1200)
OTHER CELLS NFR FLD MANUAL: 0 %
OVALOCYTES BLD QL SMEAR: ABNORMAL
OXYHGB MFR BLDA: 96.8 % (ref 94–98)
PARAINFLUENZA PCR, VIRC: NOT DETECTED
PATH REVIEW - SERUM IMMUNOFIXATION: ABNORMAL
PATH REVIEW-SERUM PROTEIN ELECTROPHORESIS: ABNORMAL
PCO2 BLDA: 20 MM HG (ref 38–42)
PH BLDA: 7.48 PH (ref 7.38–7.42)
PH UR STRIP.AUTO: 5 [PH]
PH UR STRIP.AUTO: 5 [PH]
PHOSPHATE SERPL-MCNC: 3.5 MG/DL (ref 2.5–4.9)
PHOSPHATE SERPL-MCNC: 3.9 MG/DL (ref 2.5–4.9)
PHOSPHATE SERPL-MCNC: 4.3 MG/DL (ref 2.5–4.9)
PHOSPHATE SERPL-MCNC: 6.1 MG/DL (ref 2.5–4.9)
PLASMA CELLS NFR FLD MANUAL: 0 %
PLATELET # BLD AUTO: 100 X10*3/UL (ref 150–450)
PLATELET # BLD AUTO: 103 X10*3/UL (ref 150–450)
PLATELET # BLD AUTO: 103 X10*3/UL (ref 150–450)
PLATELET # BLD AUTO: 112 X10*3/UL (ref 150–450)
PLATELET # BLD AUTO: 123 X10*3/UL (ref 150–450)
PLATELET # BLD AUTO: 202 X10*3/UL (ref 150–450)
PLATELET # BLD AUTO: 205 X10*3/UL (ref 150–450)
PLATELET # BLD AUTO: 208 X10*3/UL (ref 150–450)
PLATELET # BLD AUTO: 209 X10*3/UL (ref 150–450)
PLATELET # BLD AUTO: 211 X10*3/UL (ref 150–450)
PLATELET # BLD AUTO: 211 X10*3/UL (ref 150–450)
PLATELET # BLD AUTO: 212 X10*3/UL (ref 150–450)
PLATELET # BLD AUTO: 213 X10*3/UL (ref 150–450)
PLATELET # BLD AUTO: 220 X10*3/UL (ref 150–450)
PLATELET # BLD AUTO: 91 X10*3/UL (ref 150–450)
PLATELET # BLD AUTO: 97 X10*3/UL (ref 150–450)
PO2 BLDA: 145 MM HG (ref 85–95)
POLYCHROMASIA BLD QL SMEAR: ABNORMAL
POLYCHROMASIA BLD QL SMEAR: ABNORMAL
POTASSIUM BLDA-SCNC: 5.2 MMOL/L (ref 3.5–5.3)
POTASSIUM SERPL-SCNC: 3.6 MMOL/L (ref 3.5–5.3)
POTASSIUM SERPL-SCNC: 4 MMOL/L (ref 3.5–5.3)
POTASSIUM SERPL-SCNC: 4.1 MMOL/L (ref 3.5–5.3)
POTASSIUM SERPL-SCNC: 4.3 MMOL/L (ref 3.5–5.3)
POTASSIUM SERPL-SCNC: 4.4 MMOL/L (ref 3.5–5.3)
POTASSIUM SERPL-SCNC: 4.5 MMOL/L (ref 3.5–5.3)
POTASSIUM SERPL-SCNC: 4.5 MMOL/L (ref 3.5–5.3)
POTASSIUM SERPL-SCNC: 4.6 MMOL/L (ref 3.5–5.3)
POTASSIUM SERPL-SCNC: 4.7 MMOL/L (ref 3.5–5.3)
POTASSIUM SERPL-SCNC: 4.9 MMOL/L (ref 3.5–5.3)
POTASSIUM SERPL-SCNC: 5.2 MMOL/L (ref 3.5–5.3)
POTASSIUM SERPL-SCNC: 5.3 MMOL/L (ref 3.5–5.3)
POTASSIUM SERPL-SCNC: 5.3 MMOL/L (ref 3.5–5.3)
POTASSIUM UR-SCNC: 37 MMOL/L
POTASSIUM UR-SCNC: 51 MMOL/L
POTASSIUM/CREAT UR-RTO: 56 MMOL/G CREAT
POTASSIUM/CREAT UR-RTO: 57 MMOL/G CREAT
PROCALCITONIN SERPL-MCNC: 1.23 NG/ML
PRODUCT BLOOD TYPE: 5100
PRODUCT CODE: NORMAL
PROT FLD-MCNC: 2.9 G/DL
PROT SERPL-MCNC: 5 G/DL (ref 6.4–8.2)
PROT SERPL-MCNC: 5.2 G/DL (ref 6.4–8.2)
PROT SERPL-MCNC: 6.1 G/DL (ref 6.4–8.2)
PROT SERPL-MCNC: 6.2 G/DL (ref 6.4–8.2)
PROT SERPL-MCNC: 6.3 G/DL (ref 6.4–8.2)
PROT SERPL-MCNC: 6.4 G/DL (ref 6.4–8.2)
PROT SERPL-MCNC: 6.5 G/DL (ref 6.4–8.2)
PROT SERPL-MCNC: 6.7 G/DL (ref 6.4–8.2)
PROT SERPL-MCNC: 6.8 G/DL (ref 6.4–8.2)
PROT UR STRIP.AUTO-MCNC: ABNORMAL MG/DL
PROT UR STRIP.AUTO-MCNC: ABNORMAL MG/DL
PROTEIN ELECTROPHORESIS COMMENT: ABNORMAL
PROTHROMBIN TIME: 15.7 SECONDS (ref 9.8–12.8)
PROTHROMBIN TIME: 20.9 SECONDS (ref 9.8–12.8)
PROTHROMBIN TIME: 23.3 SECONDS (ref 9.8–12.8)
PROTHROMBIN TIME: 36.5 SECONDS (ref 9.8–12.8)
PSA FREE MFR SERPL: NORMAL %
PSA FREE SERPL-MCNC: <0.1 NG/ML
PSA SERPL IA-MCNC: <0.1 NG/ML (ref 0–4)
PSA SERPL-MCNC: <0.1 NG/ML
RBC # BLD AUTO: 2.43 X10*6/UL (ref 4.5–5.9)
RBC # BLD AUTO: 2.81 X10*6/UL (ref 4.5–5.9)
RBC # BLD AUTO: 2.88 X10*6/UL (ref 4.5–5.9)
RBC # BLD AUTO: 2.91 X10*6/UL (ref 4.5–5.9)
RBC # BLD AUTO: 2.93 X10*6/UL (ref 4.5–5.9)
RBC # BLD AUTO: 2.95 X10*6/UL (ref 4.5–5.9)
RBC # BLD AUTO: 3.05 X10*6/UL (ref 4.5–5.9)
RBC # BLD AUTO: 3.58 X10*6/UL (ref 4.5–5.9)
RBC # BLD AUTO: 3.61 X10*6/UL (ref 4.5–5.9)
RBC # BLD AUTO: 3.63 X10*6/UL (ref 4.5–5.9)
RBC # BLD AUTO: 3.68 X10*6/UL (ref 4.5–5.9)
RBC # BLD AUTO: 3.69 X10*6/UL (ref 4.5–5.9)
RBC # BLD AUTO: 3.76 X10*6/UL (ref 4.5–5.9)
RBC # BLD AUTO: 3.84 X10*6/UL (ref 4.5–5.9)
RBC # FLD MANUAL: ABNORMAL /UL
RBC # UR STRIP.AUTO: NEGATIVE /UL
RBC # UR STRIP.AUTO: NEGATIVE /UL
RBC #/AREA URNS AUTO: ABNORMAL /HPF
RBC #/AREA URNS AUTO: ABNORMAL /HPF
RBC MORPH BLD: ABNORMAL
RBC MORPH BLD: ABNORMAL
RH FACTOR (ANTIGEN D): NORMAL
RH FACTOR (ANTIGEN D): NORMAL
RSV PCR, RVP, VIRC: NOT DETECTED
S PNEUM AG UR QL: NEGATIVE
S PNEUM AG UR QL: NEGATIVE
SAO2 % BLDA: 99 % (ref 94–100)
SARS-COV-2 RNA RESP QL NAA+PROBE: NOT DETECTED
SARS-COV-2 RNA RESP QL NAA+PROBE: NOT DETECTED
SODIUM BLDA-SCNC: 141 MMOL/L (ref 136–145)
SODIUM SERPL-SCNC: 127 MMOL/L (ref 136–145)
SODIUM SERPL-SCNC: 128 MMOL/L (ref 136–145)
SODIUM SERPL-SCNC: 131 MMOL/L (ref 136–145)
SODIUM SERPL-SCNC: 131 MMOL/L (ref 136–145)
SODIUM SERPL-SCNC: 132 MMOL/L (ref 136–145)
SODIUM SERPL-SCNC: 132 MMOL/L (ref 136–145)
SODIUM SERPL-SCNC: 136 MMOL/L (ref 136–145)
SODIUM SERPL-SCNC: 140 MMOL/L (ref 136–145)
SODIUM SERPL-SCNC: 140 MMOL/L (ref 136–145)
SODIUM SERPL-SCNC: 143 MMOL/L (ref 136–145)
SODIUM SERPL-SCNC: 143 MMOL/L (ref 136–145)
SODIUM SERPL-SCNC: 144 MMOL/L (ref 136–145)
SODIUM SERPL-SCNC: 144 MMOL/L (ref 136–145)
SODIUM SERPL-SCNC: 146 MMOL/L (ref 136–145)
SODIUM SERPL-SCNC: 149 MMOL/L (ref 136–145)
SODIUM SERPL-SCNC: 149 MMOL/L (ref 136–145)
SODIUM UR-SCNC: 58 MMOL/L
SODIUM UR-SCNC: <10 MMOL/L
SODIUM/CREAT UR-RTO: 90 MMOL/G CREAT
SODIUM/CREAT UR-RTO: NORMAL
SP GR UR STRIP.AUTO: 1.02
SP GR UR STRIP.AUTO: 1.04
SPECIMEN DRAWN FROM PATIENT: ABNORMAL
TOTAL CELLS COUNTED BLD: 100
TOTAL CELLS COUNTED BLD: 100
TOTAL CELLS COUNTED FLD: 100
TSH SERPL-ACNC: 0.69 MIU/L (ref 0.44–3.98)
UNIT ABO: NORMAL
UNIT NUMBER: NORMAL
UNIT RH: NORMAL
UNIT VOLUME: 211
UNIT VOLUME: 218
UNIT VOLUME: 281
UNIT VOLUME: 350
URATE SERPL-MCNC: 6.8 MG/DL (ref 4–7.5)
UROBILINOGEN UR STRIP.AUTO-MCNC: 2 MG/DL
UROBILINOGEN UR STRIP.AUTO-MCNC: NORMAL MG/DL
VARIANT LYMPHS # BLD MANUAL: 0.11 X10*3/UL (ref 0–0.3)
VARIANT LYMPHS NFR BLD: 1 %
WBC # BLD AUTO: 10.1 X10*3/UL (ref 4.4–11.3)
WBC # BLD AUTO: 10.7 X10*3/UL (ref 4.4–11.3)
WBC # BLD AUTO: 10.8 X10*3/UL (ref 4.4–11.3)
WBC # BLD AUTO: 10.9 X10*3/UL (ref 4.4–11.3)
WBC # BLD AUTO: 10.9 X10*3/UL (ref 4.4–11.3)
WBC # BLD AUTO: 11.8 X10*3/UL (ref 4.4–11.3)
WBC # BLD AUTO: 12.6 X10*3/UL (ref 4.4–11.3)
WBC # BLD AUTO: 12.9 X10*3/UL (ref 4.4–11.3)
WBC # BLD AUTO: 13 X10*3/UL (ref 4.4–11.3)
WBC # BLD AUTO: 13.3 X10*3/UL (ref 4.4–11.3)
WBC # BLD AUTO: 14.6 X10*3/UL (ref 4.4–11.3)
WBC # BLD AUTO: 15.1 X10*3/UL (ref 4.4–11.3)
WBC # BLD AUTO: 15.4 X10*3/UL (ref 4.4–11.3)
WBC # BLD AUTO: 15.9 X10*3/UL (ref 4.4–11.3)
WBC # BLD AUTO: 9.4 X10*3/UL (ref 4.4–11.3)
WBC # BLD AUTO: 9.9 X10*3/UL (ref 4.4–11.3)
WBC # FLD MANUAL: 89 /UL
WBC #/AREA URNS AUTO: ABNORMAL /HPF
WBC #/AREA URNS AUTO: ABNORMAL /HPF
XM INTEP: NORMAL
XM INTEP: NORMAL

## 2024-01-01 PROCEDURE — 36415 COLL VENOUS BLD VENIPUNCTURE: CPT

## 2024-01-01 PROCEDURE — 84132 ASSAY OF SERUM POTASSIUM: CPT | Mod: 91 | Performed by: STUDENT IN AN ORGANIZED HEALTH CARE EDUCATION/TRAINING PROGRAM

## 2024-01-01 PROCEDURE — 71045 X-RAY EXAM CHEST 1 VIEW: CPT

## 2024-01-01 PROCEDURE — 2500000006 HC RX 250 W HCPCS SELF ADMINISTERED DRUGS (ALT 637 FOR ALL PAYERS): Performed by: NURSE PRACTITIONER

## 2024-01-01 PROCEDURE — 83735 ASSAY OF MAGNESIUM: CPT | Performed by: STUDENT IN AN ORGANIZED HEALTH CARE EDUCATION/TRAINING PROGRAM

## 2024-01-01 PROCEDURE — 96365 THER/PROPH/DIAG IV INF INIT: CPT | Mod: 59

## 2024-01-01 PROCEDURE — 2500000001 HC RX 250 WO HCPCS SELF ADMINISTERED DRUGS (ALT 637 FOR MEDICARE OP)

## 2024-01-01 PROCEDURE — 83735 ASSAY OF MAGNESIUM: CPT

## 2024-01-01 PROCEDURE — 2500000001 HC RX 250 WO HCPCS SELF ADMINISTERED DRUGS (ALT 637 FOR MEDICARE OP): Performed by: NURSE PRACTITIONER

## 2024-01-01 PROCEDURE — 36415 COLL VENOUS BLD VENIPUNCTURE: CPT | Performed by: INTERNAL MEDICINE

## 2024-01-01 PROCEDURE — 87635 SARS-COV-2 COVID-19 AMP PRB: CPT | Performed by: STUDENT IN AN ORGANIZED HEALTH CARE EDUCATION/TRAINING PROGRAM

## 2024-01-01 PROCEDURE — 5A0935A ASSISTANCE WITH RESPIRATORY VENTILATION, LESS THAN 24 CONSECUTIVE HOURS, HIGH NASAL FLOW/VELOCITY: ICD-10-PCS | Performed by: INTERNAL MEDICINE

## 2024-01-01 PROCEDURE — 94640 AIRWAY INHALATION TREATMENT: CPT

## 2024-01-01 PROCEDURE — 85027 COMPLETE CBC AUTOMATED: CPT | Mod: 91

## 2024-01-01 PROCEDURE — 89051 BODY FLUID CELL COUNT: CPT

## 2024-01-01 PROCEDURE — 85027 COMPLETE CBC AUTOMATED: CPT | Performed by: INTERNAL MEDICINE

## 2024-01-01 PROCEDURE — 82043 UR ALBUMIN QUANTITATIVE: CPT | Mod: 91 | Performed by: INTERNAL MEDICINE

## 2024-01-01 PROCEDURE — 81001 URINALYSIS AUTO W/SCOPE: CPT | Performed by: STUDENT IN AN ORGANIZED HEALTH CARE EDUCATION/TRAINING PROGRAM

## 2024-01-01 PROCEDURE — 82947 ASSAY GLUCOSE BLOOD QUANT: CPT

## 2024-01-01 PROCEDURE — 84100 ASSAY OF PHOSPHORUS: CPT | Performed by: INTERNAL MEDICINE

## 2024-01-01 PROCEDURE — 80069 RENAL FUNCTION PANEL: CPT

## 2024-01-01 PROCEDURE — 85027 COMPLETE CBC AUTOMATED: CPT

## 2024-01-01 PROCEDURE — 97161 PT EVAL LOW COMPLEX 20 MIN: CPT | Mod: GP

## 2024-01-01 PROCEDURE — 2020000001 HC ICU ROOM DAILY

## 2024-01-01 PROCEDURE — 2500000004 HC RX 250 GENERAL PHARMACY W/ HCPCS (ALT 636 FOR OP/ED): Performed by: STUDENT IN AN ORGANIZED HEALTH CARE EDUCATION/TRAINING PROGRAM

## 2024-01-01 PROCEDURE — 71275 CT ANGIOGRAPHY CHEST: CPT | Performed by: RADIOLOGY

## 2024-01-01 PROCEDURE — 85610 PROTHROMBIN TIME: CPT | Performed by: STUDENT IN AN ORGANIZED HEALTH CARE EDUCATION/TRAINING PROGRAM

## 2024-01-01 PROCEDURE — 94668 MNPJ CHEST WALL SBSQ: CPT

## 2024-01-01 PROCEDURE — 71275 CT ANGIOGRAPHY CHEST: CPT

## 2024-01-01 PROCEDURE — 2500000005 HC RX 250 GENERAL PHARMACY W/O HCPCS: Performed by: INTERNAL MEDICINE

## 2024-01-01 PROCEDURE — 1100000001 HC PRIVATE ROOM DAILY

## 2024-01-01 PROCEDURE — 0W9B30Z DRAINAGE OF LEFT PLEURAL CAVITY WITH DRAINAGE DEVICE, PERCUTANEOUS APPROACH: ICD-10-PCS | Performed by: INTERNAL MEDICINE

## 2024-01-01 PROCEDURE — 87449 NOS EACH ORGANISM AG IA: CPT | Mod: PARLAB

## 2024-01-01 PROCEDURE — 94799 UNLISTED PULMONARY SVC/PX: CPT

## 2024-01-01 PROCEDURE — 2500000004 HC RX 250 GENERAL PHARMACY W/ HCPCS (ALT 636 FOR OP/ED): Performed by: INTERNAL MEDICINE

## 2024-01-01 PROCEDURE — 2500000005 HC RX 250 GENERAL PHARMACY W/O HCPCS

## 2024-01-01 PROCEDURE — P9017 PLASMA 1 DONOR FRZ W/IN 8 HR: HCPCS

## 2024-01-01 PROCEDURE — 2580000001 HC RX 258 IV SOLUTIONS: Performed by: INTERNAL MEDICINE

## 2024-01-01 PROCEDURE — 83521 IG LIGHT CHAINS FREE EACH: CPT | Mod: PARLAB | Performed by: INTERNAL MEDICINE

## 2024-01-01 PROCEDURE — 85025 COMPLETE CBC W/AUTO DIFF WBC: CPT | Performed by: STUDENT IN AN ORGANIZED HEALTH CARE EDUCATION/TRAINING PROGRAM

## 2024-01-01 PROCEDURE — 93005 ELECTROCARDIOGRAM TRACING: CPT

## 2024-01-01 PROCEDURE — 84484 ASSAY OF TROPONIN QUANT: CPT | Performed by: STUDENT IN AN ORGANIZED HEALTH CARE EDUCATION/TRAINING PROGRAM

## 2024-01-01 PROCEDURE — 97116 GAIT TRAINING THERAPY: CPT | Mod: GP,CQ

## 2024-01-01 PROCEDURE — G0378 HOSPITAL OBSERVATION PER HR: HCPCS

## 2024-01-01 PROCEDURE — 94660 CPAP INITIATION&MGMT: CPT

## 2024-01-01 PROCEDURE — 2500000001 HC RX 250 WO HCPCS SELF ADMINISTERED DRUGS (ALT 637 FOR MEDICARE OP): Performed by: INTERNAL MEDICINE

## 2024-01-01 PROCEDURE — 96365 THER/PROPH/DIAG IV INF INIT: CPT

## 2024-01-01 PROCEDURE — 84153 ASSAY OF PSA TOTAL: CPT | Mod: PARLAB | Performed by: INTERNAL MEDICINE

## 2024-01-01 PROCEDURE — 88341 IMHCHEM/IMCYTCHM EA ADD ANTB: CPT | Mod: TC,91

## 2024-01-01 PROCEDURE — 72128 CT CHEST SPINE W/O DYE: CPT

## 2024-01-01 PROCEDURE — 36415 COLL VENOUS BLD VENIPUNCTURE: CPT | Performed by: NURSE PRACTITIONER

## 2024-01-01 PROCEDURE — 83935 ASSAY OF URINE OSMOLALITY: CPT | Mod: PARLAB | Performed by: INTERNAL MEDICINE

## 2024-01-01 PROCEDURE — 84154 ASSAY OF PSA FREE: CPT | Performed by: INTERNAL MEDICINE

## 2024-01-01 PROCEDURE — 87632 RESP VIRUS 6-11 TARGETS: CPT

## 2024-01-01 PROCEDURE — 84100 ASSAY OF PHOSPHORUS: CPT

## 2024-01-01 PROCEDURE — 84155 ASSAY OF PROTEIN SERUM: CPT

## 2024-01-01 PROCEDURE — 86320 SERUM IMMUNOELECTROPHORESIS: CPT | Performed by: INTERNAL MEDICINE

## 2024-01-01 PROCEDURE — 97165 OT EVAL LOW COMPLEX 30 MIN: CPT | Mod: GO

## 2024-01-01 PROCEDURE — 85730 THROMBOPLASTIN TIME PARTIAL: CPT | Performed by: STUDENT IN AN ORGANIZED HEALTH CARE EDUCATION/TRAINING PROGRAM

## 2024-01-01 PROCEDURE — 70450 CT HEAD/BRAIN W/O DYE: CPT

## 2024-01-01 PROCEDURE — 87040 BLOOD CULTURE FOR BACTERIA: CPT | Mod: PARLAB | Performed by: STUDENT IN AN ORGANIZED HEALTH CARE EDUCATION/TRAINING PROGRAM

## 2024-01-01 PROCEDURE — 83880 ASSAY OF NATRIURETIC PEPTIDE: CPT | Performed by: STUDENT IN AN ORGANIZED HEALTH CARE EDUCATION/TRAINING PROGRAM

## 2024-01-01 PROCEDURE — 96367 TX/PROPH/DG ADDL SEQ IV INF: CPT

## 2024-01-01 PROCEDURE — 99285 EMERGENCY DEPT VISIT HI MDM: CPT | Mod: 25,CS

## 2024-01-01 PROCEDURE — 2550000001 HC RX 255 CONTRASTS: Performed by: INTERNAL MEDICINE

## 2024-01-01 PROCEDURE — 5A09457 ASSISTANCE WITH RESPIRATORY VENTILATION, 24-96 CONSECUTIVE HOURS, CONTINUOUS POSITIVE AIRWAY PRESSURE: ICD-10-PCS | Performed by: INTERNAL MEDICINE

## 2024-01-01 PROCEDURE — 36415 COLL VENOUS BLD VENIPUNCTURE: CPT | Performed by: STUDENT IN AN ORGANIZED HEALTH CARE EDUCATION/TRAINING PROGRAM

## 2024-01-01 PROCEDURE — 86334 IMMUNOFIX E-PHORESIS SERUM: CPT | Mod: PARLAB | Performed by: INTERNAL MEDICINE

## 2024-01-01 PROCEDURE — 88341 IMHCHEM/IMCYTCHM EA ADD ANTB: CPT | Performed by: PATHOLOGY

## 2024-01-01 PROCEDURE — 72125 CT NECK SPINE W/O DYE: CPT

## 2024-01-01 PROCEDURE — 83605 ASSAY OF LACTIC ACID: CPT | Mod: 91 | Performed by: STUDENT IN AN ORGANIZED HEALTH CARE EDUCATION/TRAINING PROGRAM

## 2024-01-01 PROCEDURE — 1200000002 HC GENERAL ROOM WITH TELEMETRY DAILY

## 2024-01-01 PROCEDURE — 86738 MYCOPLASMA ANTIBODY: CPT

## 2024-01-01 PROCEDURE — 80053 COMPREHEN METABOLIC PANEL: CPT | Performed by: INTERNAL MEDICINE

## 2024-01-01 PROCEDURE — 2500000004 HC RX 250 GENERAL PHARMACY W/ HCPCS (ALT 636 FOR OP/ED): Performed by: NURSE PRACTITIONER

## 2024-01-01 PROCEDURE — 84550 ASSAY OF BLOOD/URIC ACID: CPT | Performed by: INTERNAL MEDICINE

## 2024-01-01 PROCEDURE — 88305 TISSUE EXAM BY PATHOLOGIST: CPT | Performed by: PATHOLOGY

## 2024-01-01 PROCEDURE — 84484 ASSAY OF TROPONIN QUANT: CPT

## 2024-01-01 PROCEDURE — 2550000001 HC RX 255 CONTRASTS: Performed by: STUDENT IN AN ORGANIZED HEALTH CARE EDUCATION/TRAINING PROGRAM

## 2024-01-01 PROCEDURE — 97535 SELF CARE MNGMENT TRAINING: CPT | Mod: GO

## 2024-01-01 PROCEDURE — 82947 ASSAY GLUCOSE BLOOD QUANT: CPT | Mod: 91

## 2024-01-01 PROCEDURE — 32551 INSERTION OF CHEST TUBE: CPT | Performed by: INTERNAL MEDICINE

## 2024-01-01 PROCEDURE — 83605 ASSAY OF LACTIC ACID: CPT | Mod: 91

## 2024-01-01 PROCEDURE — 85027 COMPLETE CBC AUTOMATED: CPT | Performed by: NURSE PRACTITIONER

## 2024-01-01 PROCEDURE — 71045 X-RAY EXAM CHEST 1 VIEW: CPT | Performed by: RADIOLOGY

## 2024-01-01 PROCEDURE — 83605 ASSAY OF LACTIC ACID: CPT

## 2024-01-01 PROCEDURE — 84132 ASSAY OF SERUM POTASSIUM: CPT

## 2024-01-01 PROCEDURE — 2500000004 HC RX 250 GENERAL PHARMACY W/ HCPCS (ALT 636 FOR OP/ED): Mod: JZ

## 2024-01-01 PROCEDURE — 82436 ASSAY OF URINE CHLORIDE: CPT

## 2024-01-01 PROCEDURE — 96368 THER/DIAG CONCURRENT INF: CPT | Mod: 59

## 2024-01-01 PROCEDURE — 84155 ASSAY OF PROTEIN SERUM: CPT | Mod: PARLAB | Performed by: INTERNAL MEDICINE

## 2024-01-01 PROCEDURE — 2500000006 HC RX 250 W HCPCS SELF ADMINISTERED DRUGS (ALT 637 FOR ALL PAYERS): Mod: MUE | Performed by: NURSE PRACTITIONER

## 2024-01-01 PROCEDURE — 77075 RADEX OSSEOUS SURVEY COMPL: CPT | Performed by: RADIOLOGY

## 2024-01-01 PROCEDURE — 85007 BL SMEAR W/DIFF WBC COUNT: CPT | Performed by: INTERNAL MEDICINE

## 2024-01-01 PROCEDURE — 82533 TOTAL CORTISOL: CPT | Mod: PARLAB | Performed by: INTERNAL MEDICINE

## 2024-01-01 PROCEDURE — 2500000004 HC RX 250 GENERAL PHARMACY W/ HCPCS (ALT 636 FOR OP/ED)

## 2024-01-01 PROCEDURE — 73502 X-RAY EXAM HIP UNI 2-3 VIEWS: CPT | Mod: RIGHT SIDE | Performed by: RADIOLOGY

## 2024-01-01 PROCEDURE — 99233 SBSQ HOSP IP/OBS HIGH 50: CPT

## 2024-01-01 PROCEDURE — 80048 BASIC METABOLIC PNL TOTAL CA: CPT | Mod: 91

## 2024-01-01 PROCEDURE — 80051 ELECTROLYTE PANEL: CPT | Mod: 91 | Performed by: STUDENT IN AN ORGANIZED HEALTH CARE EDUCATION/TRAINING PROGRAM

## 2024-01-01 PROCEDURE — 2060000001 HC INTERMEDIATE ICU ROOM DAILY

## 2024-01-01 PROCEDURE — 80048 BASIC METABOLIC PNL TOTAL CA: CPT | Performed by: NURSE PRACTITIONER

## 2024-01-01 PROCEDURE — C9113 INJ PANTOPRAZOLE SODIUM, VIA: HCPCS

## 2024-01-01 PROCEDURE — 87899 AGENT NOS ASSAY W/OPTIC: CPT | Mod: PARLAB | Performed by: NURSE PRACTITIONER

## 2024-01-01 PROCEDURE — 2500000004 HC RX 250 GENERAL PHARMACY W/ HCPCS (ALT 636 FOR OP/ED): Mod: JZ | Performed by: STUDENT IN AN ORGANIZED HEALTH CARE EDUCATION/TRAINING PROGRAM

## 2024-01-01 PROCEDURE — 85027 COMPLETE CBC AUTOMATED: CPT | Performed by: STUDENT IN AN ORGANIZED HEALTH CARE EDUCATION/TRAINING PROGRAM

## 2024-01-01 PROCEDURE — 2500000005 HC RX 250 GENERAL PHARMACY W/O HCPCS: Performed by: NURSE PRACTITIONER

## 2024-01-01 PROCEDURE — 83605 ASSAY OF LACTIC ACID: CPT | Performed by: INTERNAL MEDICINE

## 2024-01-01 PROCEDURE — 96367 TX/PROPH/DG ADDL SEQ IV INF: CPT | Mod: 59

## 2024-01-01 PROCEDURE — 86900 BLOOD TYPING SEROLOGIC ABO: CPT | Mod: 91

## 2024-01-01 PROCEDURE — 82436 ASSAY OF URINE CHLORIDE: CPT | Performed by: INTERNAL MEDICINE

## 2024-01-01 PROCEDURE — 99222 1ST HOSP IP/OBS MODERATE 55: CPT

## 2024-01-01 PROCEDURE — 99239 HOSP IP/OBS DSCHRG MGMT >30: CPT

## 2024-01-01 PROCEDURE — 86901 BLOOD TYPING SEROLOGIC RH(D): CPT | Performed by: STUDENT IN AN ORGANIZED HEALTH CARE EDUCATION/TRAINING PROGRAM

## 2024-01-01 PROCEDURE — 84157 ASSAY OF PROTEIN OTHER: CPT | Mod: PARLAB

## 2024-01-01 PROCEDURE — 83615 LACTATE (LD) (LDH) ENZYME: CPT

## 2024-01-01 PROCEDURE — 84443 ASSAY THYROID STIM HORMONE: CPT | Performed by: INTERNAL MEDICINE

## 2024-01-01 PROCEDURE — 36600 WITHDRAWAL OF ARTERIAL BLOOD: CPT

## 2024-01-01 PROCEDURE — 85007 BL SMEAR W/DIFF WBC COUNT: CPT | Performed by: STUDENT IN AN ORGANIZED HEALTH CARE EDUCATION/TRAINING PROGRAM

## 2024-01-01 PROCEDURE — 83874 ASSAY OF MYOGLOBIN: CPT | Mod: PARLAB | Performed by: INTERNAL MEDICINE

## 2024-01-01 PROCEDURE — 97535 SELF CARE MNGMENT TRAINING: CPT | Mod: CQ,GP

## 2024-01-01 PROCEDURE — 99285 EMERGENCY DEPT VISIT HI MDM: CPT | Mod: 25

## 2024-01-01 PROCEDURE — 88112 CYTOPATH CELL ENHANCE TECH: CPT | Performed by: PATHOLOGY

## 2024-01-01 PROCEDURE — 99291 CRITICAL CARE FIRST HOUR: CPT | Performed by: THORACIC SURGERY (CARDIOTHORACIC VASCULAR SURGERY)

## 2024-01-01 PROCEDURE — 71260 CT THORAX DX C+: CPT | Performed by: RADIOLOGY

## 2024-01-01 PROCEDURE — 83615 LACTATE (LD) (LDH) ENZYME: CPT | Mod: PARLAB

## 2024-01-01 PROCEDURE — P9040 RBC LEUKOREDUCED IRRADIATED: HCPCS

## 2024-01-01 PROCEDURE — 72125 CT NECK SPINE W/O DYE: CPT | Performed by: RADIOLOGY

## 2024-01-01 PROCEDURE — 84075 ASSAY ALKALINE PHOSPHATASE: CPT | Performed by: INTERNAL MEDICINE

## 2024-01-01 PROCEDURE — 72128 CT CHEST SPINE W/O DYE: CPT | Performed by: RADIOLOGY

## 2024-01-01 PROCEDURE — G0390 TRAUMA RESPONS W/HOSP CRITI: HCPCS

## 2024-01-01 PROCEDURE — 82435 ASSAY OF BLOOD CHLORIDE: CPT | Performed by: STUDENT IN AN ORGANIZED HEALTH CARE EDUCATION/TRAINING PROGRAM

## 2024-01-01 PROCEDURE — 74177 CT ABD & PELVIS W/CONTRAST: CPT

## 2024-01-01 PROCEDURE — 71045 X-RAY EXAM CHEST 1 VIEW: CPT | Mod: FOREIGN READ | Performed by: RADIOLOGY

## 2024-01-01 PROCEDURE — 71045 X-RAY EXAM CHEST 1 VIEW: CPT | Performed by: STUDENT IN AN ORGANIZED HEALTH CARE EDUCATION/TRAINING PROGRAM

## 2024-01-01 PROCEDURE — 87641 MR-STAPH DNA AMP PROBE: CPT | Performed by: STUDENT IN AN ORGANIZED HEALTH CARE EDUCATION/TRAINING PROGRAM

## 2024-01-01 PROCEDURE — 94667 MNPJ CHEST WALL 1ST: CPT

## 2024-01-01 PROCEDURE — 85610 PROTHROMBIN TIME: CPT

## 2024-01-01 PROCEDURE — 86901 BLOOD TYPING SEROLOGIC RH(D): CPT

## 2024-01-01 PROCEDURE — 86920 COMPATIBILITY TEST SPIN: CPT | Mod: 91

## 2024-01-01 PROCEDURE — 87449 NOS EACH ORGANISM AG IA: CPT | Mod: PARLAB | Performed by: NURSE PRACTITIONER

## 2024-01-01 PROCEDURE — 74177 CT ABD & PELVIS W/CONTRAST: CPT | Performed by: RADIOLOGY

## 2024-01-01 PROCEDURE — 99291 CRITICAL CARE FIRST HOUR: CPT

## 2024-01-01 PROCEDURE — 36430 TRANSFUSION BLD/BLD COMPNT: CPT

## 2024-01-01 PROCEDURE — 77075 RADEX OSSEOUS SURVEY COMPL: CPT

## 2024-01-01 PROCEDURE — 83605 ASSAY OF LACTIC ACID: CPT | Mod: 91 | Performed by: INTERNAL MEDICINE

## 2024-01-01 PROCEDURE — 82374 ASSAY BLOOD CARBON DIOXIDE: CPT

## 2024-01-01 PROCEDURE — 84484 ASSAY OF TROPONIN QUANT: CPT | Mod: 91 | Performed by: STUDENT IN AN ORGANIZED HEALTH CARE EDUCATION/TRAINING PROGRAM

## 2024-01-01 PROCEDURE — 84165 PROTEIN E-PHORESIS SERUM: CPT | Performed by: INTERNAL MEDICINE

## 2024-01-01 PROCEDURE — 97116 GAIT TRAINING THERAPY: CPT | Mod: CQ,GP

## 2024-01-01 PROCEDURE — 80053 COMPREHEN METABOLIC PANEL: CPT | Performed by: STUDENT IN AN ORGANIZED HEALTH CARE EDUCATION/TRAINING PROGRAM

## 2024-01-01 PROCEDURE — 73502 X-RAY EXAM HIP UNI 2-3 VIEWS: CPT | Mod: RT

## 2024-01-01 PROCEDURE — 2500000002 HC RX 250 W HCPCS SELF ADMINISTERED DRUGS (ALT 637 FOR MEDICARE OP, ALT 636 FOR OP/ED)

## 2024-01-01 PROCEDURE — 97535 SELF CARE MNGMENT TRAINING: CPT | Mod: GP,CQ

## 2024-01-01 PROCEDURE — 88342 IMHCHEM/IMCYTCHM 1ST ANTB: CPT | Performed by: PATHOLOGY

## 2024-01-01 PROCEDURE — 84145 PROCALCITONIN (PCT): CPT | Mod: PARLAB | Performed by: INTERNAL MEDICINE

## 2024-01-01 PROCEDURE — 92526 ORAL FUNCTION THERAPY: CPT | Mod: GN

## 2024-01-01 PROCEDURE — 87899 AGENT NOS ASSAY W/OPTIC: CPT | Mod: PARLAB

## 2024-01-01 PROCEDURE — 99233 SBSQ HOSP IP/OBS HIGH 50: CPT | Performed by: INTERNAL MEDICINE

## 2024-01-01 PROCEDURE — 87070 CULTURE OTHR SPECIMN AEROBIC: CPT | Mod: PARLAB

## 2024-01-01 PROCEDURE — 99223 1ST HOSP IP/OBS HIGH 75: CPT

## 2024-01-01 PROCEDURE — 97110 THERAPEUTIC EXERCISES: CPT | Mod: GP,CQ

## 2024-01-01 PROCEDURE — 92610 EVALUATE SWALLOWING FUNCTION: CPT | Mod: GN

## 2024-01-01 RX ORDER — METHYLPREDNISOLONE 4 MG/1
12 TABLET ORAL ONCE
Status: COMPLETED | OUTPATIENT
Start: 2024-01-01 | End: 2024-01-01

## 2024-01-01 RX ORDER — DEXTROSE MONOHYDRATE AND SODIUM CHLORIDE 5; .45 G/100ML; G/100ML
100 INJECTION, SOLUTION INTRAVENOUS CONTINUOUS
Status: DISCONTINUED | OUTPATIENT
Start: 2024-01-01 | End: 2024-01-01

## 2024-01-01 RX ORDER — DEXTROSE 50 % IN WATER (D50W) INTRAVENOUS SYRINGE
12.5
Status: DISCONTINUED | OUTPATIENT
Start: 2024-01-01 | End: 2024-01-01

## 2024-01-01 RX ORDER — DILTIAZEM HYDROCHLORIDE 5 MG/ML
20 INJECTION INTRAVENOUS ONCE
Status: DISCONTINUED | OUTPATIENT
Start: 2024-01-01 | End: 2024-01-01

## 2024-01-01 RX ORDER — LIDOCAINE 560 MG/1
1 PATCH PERCUTANEOUS; TOPICAL; TRANSDERMAL DAILY
Start: 2024-01-01 | End: 2024-06-04

## 2024-01-01 RX ORDER — METHYLPREDNISOLONE 4 MG/1
8 TABLET ORAL ONCE
Status: COMPLETED | OUTPATIENT
Start: 2024-01-01 | End: 2024-01-01

## 2024-01-01 RX ORDER — PREDNISONE 20 MG/1
20 TABLET ORAL DAILY
Status: DISCONTINUED | OUTPATIENT
Start: 2024-01-01 | End: 2024-01-01

## 2024-01-01 RX ORDER — LIDOCAINE 560 MG/1
1 PATCH PERCUTANEOUS; TOPICAL; TRANSDERMAL DAILY
Status: DISCONTINUED | OUTPATIENT
Start: 2024-01-01 | End: 2024-01-01 | Stop reason: HOSPADM

## 2024-01-01 RX ORDER — ACETAMINOPHEN 325 MG/1
650 TABLET ORAL EVERY 4 HOURS PRN
Start: 2024-01-01 | End: 2024-06-04

## 2024-01-01 RX ORDER — METHYLPREDNISOLONE 4 MG/1
16 TABLET ORAL ONCE
Status: COMPLETED | OUTPATIENT
Start: 2024-01-01 | End: 2024-01-01

## 2024-01-01 RX ORDER — BISACODYL 10 MG/1
10 SUPPOSITORY RECTAL DAILY PRN
COMMUNITY

## 2024-01-01 RX ORDER — PANTOPRAZOLE SODIUM 40 MG/1
40 TABLET, DELAYED RELEASE ORAL
Status: DISCONTINUED | OUTPATIENT
Start: 2024-01-01 | End: 2024-01-01

## 2024-01-01 RX ORDER — MORPHINE SULFATE 2 MG/ML
2 INJECTION, SOLUTION INTRAMUSCULAR; INTRAVENOUS
Status: DISCONTINUED | OUTPATIENT
Start: 2024-01-01 | End: 2024-01-01 | Stop reason: HOSPADM

## 2024-01-01 RX ORDER — LEVOFLOXACIN 500 MG/1
500 TABLET, FILM COATED ORAL
Qty: 7 TABLET | Refills: 0 | Status: SHIPPED | OUTPATIENT
Start: 2024-01-01 | End: 2024-01-01

## 2024-01-01 RX ORDER — ALBUTEROL SULFATE 0.83 MG/ML
2.5 SOLUTION RESPIRATORY (INHALATION) EVERY 2 HOUR PRN
Status: DISCONTINUED | OUTPATIENT
Start: 2024-01-01 | End: 2024-01-01 | Stop reason: HOSPADM

## 2024-01-01 RX ORDER — AMLODIPINE BESYLATE 5 MG/1
5 TABLET ORAL DAILY
Status: DISCONTINUED | OUTPATIENT
Start: 2024-01-01 | End: 2024-01-01 | Stop reason: HOSPADM

## 2024-01-01 RX ORDER — PREDNISONE 10 MG/1
5 TABLET ORAL DAILY
Status: DISCONTINUED | OUTPATIENT
Start: 2024-01-01 | End: 2024-01-01

## 2024-01-01 RX ORDER — PANTOPRAZOLE SODIUM 40 MG/10ML
40 INJECTION, POWDER, LYOPHILIZED, FOR SOLUTION INTRAVENOUS
Status: DISCONTINUED | OUTPATIENT
Start: 2024-01-01 | End: 2024-01-01

## 2024-01-01 RX ORDER — SODIUM BICARBONATE 650 MG/1
650 TABLET ORAL 2 TIMES DAILY
Status: DISCONTINUED | OUTPATIENT
Start: 2024-01-01 | End: 2024-01-01 | Stop reason: HOSPADM

## 2024-01-01 RX ORDER — PREDNISONE 10 MG/1
10 TABLET ORAL ONCE
Status: COMPLETED | OUTPATIENT
Start: 2024-01-01 | End: 2024-01-01

## 2024-01-01 RX ORDER — ADHESIVE BANDAGE
30 BANDAGE TOPICAL DAILY PRN
COMMUNITY

## 2024-01-01 RX ORDER — METHYLPREDNISOLONE 4 MG/1
20 TABLET ORAL ONCE
Status: COMPLETED | OUTPATIENT
Start: 2024-01-01 | End: 2024-01-01

## 2024-01-01 RX ORDER — MORPHINE SULFATE 2 MG/ML
1 INJECTION, SOLUTION INTRAMUSCULAR; INTRAVENOUS
Status: DISCONTINUED | OUTPATIENT
Start: 2024-01-01 | End: 2024-01-01

## 2024-01-01 RX ORDER — ACETAMINOPHEN 325 MG/1
650 TABLET ORAL EVERY 4 HOURS PRN
Status: DISCONTINUED | OUTPATIENT
Start: 2024-01-01 | End: 2024-01-01 | Stop reason: HOSPADM

## 2024-01-01 RX ORDER — ACETAMINOPHEN 500 MG
5 TABLET ORAL ONCE
Status: COMPLETED | OUTPATIENT
Start: 2024-01-01 | End: 2024-01-01

## 2024-01-01 RX ORDER — OXYCODONE HYDROCHLORIDE 5 MG/1
TABLET ORAL
COMMUNITY
End: 2024-01-01 | Stop reason: ALTCHOICE

## 2024-01-01 RX ORDER — DEXTROSE MONOHYDRATE AND SODIUM CHLORIDE 5; .9 G/100ML; G/100ML
75 INJECTION, SOLUTION INTRAVENOUS CONTINUOUS
Status: DISCONTINUED | OUTPATIENT
Start: 2024-01-01 | End: 2024-01-01

## 2024-01-01 RX ORDER — ACETAMINOPHEN 500 MG
5 TABLET ORAL NIGHTLY PRN
Status: DISCONTINUED | OUTPATIENT
Start: 2024-01-01 | End: 2024-01-01 | Stop reason: HOSPADM

## 2024-01-01 RX ORDER — PREDNISONE 20 MG/1
20 TABLET ORAL DAILY
Status: DISCONTINUED | OUTPATIENT
Start: 2024-01-01 | End: 2024-01-01 | Stop reason: HOSPADM

## 2024-01-01 RX ORDER — MIRTAZAPINE 15 MG/1
15 TABLET, ORALLY DISINTEGRATING ORAL NIGHTLY
Status: DISCONTINUED | OUTPATIENT
Start: 2024-01-01 | End: 2024-01-01 | Stop reason: HOSPADM

## 2024-01-01 RX ORDER — SODIUM BICARBONATE 650 MG/1
650 TABLET ORAL 2 TIMES DAILY
Qty: 60 TABLET | Refills: 1 | Status: SHIPPED | OUTPATIENT
Start: 2024-01-01 | End: 2024-06-04

## 2024-01-01 RX ORDER — MIRTAZAPINE 15 MG/1
15 TABLET, ORALLY DISINTEGRATING ORAL NIGHTLY
Start: 2024-01-01 | End: 2024-06-04

## 2024-01-01 RX ORDER — LEVOFLOXACIN 500 MG/1
500 TABLET, FILM COATED ORAL
Status: DISCONTINUED | OUTPATIENT
Start: 2024-01-01 | End: 2024-01-01 | Stop reason: HOSPADM

## 2024-01-01 RX ORDER — DEXTROSE 50 % IN WATER (D50W) INTRAVENOUS SYRINGE
25
Status: DISCONTINUED | OUTPATIENT
Start: 2024-01-01 | End: 2024-01-01

## 2024-01-01 RX ORDER — METOPROLOL TARTRATE 1 MG/ML
5 INJECTION, SOLUTION INTRAVENOUS EVERY 6 HOURS PRN
Status: DISCONTINUED | OUTPATIENT
Start: 2024-01-01 | End: 2024-01-01 | Stop reason: HOSPADM

## 2024-01-01 RX ORDER — BISACODYL 10 MG/1
10 SUPPOSITORY RECTAL ONCE
Status: DISCONTINUED | OUTPATIENT
Start: 2024-01-01 | End: 2024-01-01 | Stop reason: HOSPADM

## 2024-01-01 RX ORDER — METHYLPREDNISOLONE 4 MG/1
24 TABLET ORAL ONCE
Status: COMPLETED | OUTPATIENT
Start: 2024-01-01 | End: 2024-01-01

## 2024-01-01 RX ORDER — ONDANSETRON HYDROCHLORIDE 2 MG/ML
4 INJECTION, SOLUTION INTRAVENOUS EVERY 8 HOURS PRN
Status: DISCONTINUED | OUTPATIENT
Start: 2024-01-01 | End: 2024-01-01 | Stop reason: HOSPADM

## 2024-01-01 RX ORDER — ONDANSETRON 4 MG/1
4 TABLET, FILM COATED ORAL EVERY 8 HOURS PRN
Status: DISCONTINUED | OUTPATIENT
Start: 2024-01-01 | End: 2024-01-01 | Stop reason: HOSPADM

## 2024-01-01 RX ORDER — AMLODIPINE BESYLATE 5 MG/1
1 TABLET ORAL DAILY
COMMUNITY
Start: 2024-01-01

## 2024-01-01 RX ORDER — ENEMA 19; 7 G/133ML; G/133ML
118 ENEMA RECTAL DAILY PRN
COMMUNITY

## 2024-01-01 RX ORDER — MORPHINE SULFATE 2 MG/ML
2 INJECTION, SOLUTION INTRAMUSCULAR; INTRAVENOUS
Status: DISCONTINUED | OUTPATIENT
Start: 2024-01-01 | End: 2024-01-01

## 2024-01-01 RX ORDER — ESOMEPRAZOLE MAGNESIUM 40 MG/1
40 GRANULE, DELAYED RELEASE ORAL
Status: DISCONTINUED | OUTPATIENT
Start: 2024-01-01 | End: 2024-01-01

## 2024-01-01 RX ORDER — LEVOFLOXACIN 500 MG/1
500 TABLET, FILM COATED ORAL
Status: DISCONTINUED | OUTPATIENT
Start: 2024-01-01 | End: 2024-01-01

## 2024-01-01 RX ORDER — PREDNISONE 10 MG/1
10 TABLET ORAL DAILY
Status: DISCONTINUED | OUTPATIENT
Start: 2024-01-01 | End: 2024-01-01

## 2024-01-01 RX ORDER — LORAZEPAM 2 MG/ML
1 INJECTION INTRAMUSCULAR EVERY 4 HOURS PRN
Status: DISCONTINUED | OUTPATIENT
Start: 2024-01-01 | End: 2024-01-01 | Stop reason: HOSPADM

## 2024-01-01 RX ORDER — CEFEPIME 1 G/50ML
2 INJECTION, SOLUTION INTRAVENOUS ONCE
Status: COMPLETED | OUTPATIENT
Start: 2024-01-01 | End: 2024-01-01

## 2024-01-01 RX ORDER — PREDNISONE 20 MG/1
20 TABLET ORAL DAILY
Start: 2024-01-01 | End: 2024-06-04

## 2024-01-01 RX ORDER — SODIUM CHLORIDE FOR INHALATION 3 %
3 VIAL, NEBULIZER (ML) INHALATION EVERY 6 HOURS SCHEDULED
Status: DISCONTINUED | OUTPATIENT
Start: 2024-01-01 | End: 2024-01-01 | Stop reason: HOSPADM

## 2024-01-01 RX ORDER — METHYLPREDNISOLONE 4 MG/1
4 TABLET ORAL ONCE
Status: COMPLETED | OUTPATIENT
Start: 2024-01-01 | End: 2024-01-01

## 2024-01-01 RX ORDER — CEFEPIME 1 G/50ML
2 INJECTION, SOLUTION INTRAVENOUS EVERY 12 HOURS
Status: DISCONTINUED | OUTPATIENT
Start: 2024-01-01 | End: 2024-01-01

## 2024-01-01 RX ORDER — INSULIN LISPRO 100 [IU]/ML
0-5 INJECTION, SOLUTION INTRAVENOUS; SUBCUTANEOUS
Status: DISCONTINUED | OUTPATIENT
Start: 2024-01-01 | End: 2024-01-01

## 2024-01-01 RX ORDER — DILTIAZEM HYDROCHLORIDE 60 MG/1
30 TABLET, FILM COATED ORAL EVERY 6 HOURS
Status: DISCONTINUED | OUTPATIENT
Start: 2024-01-01 | End: 2024-01-01

## 2024-01-01 RX ORDER — CEFTRIAXONE 2 G/50ML
2 INJECTION, SOLUTION INTRAVENOUS EVERY 24 HOURS
Status: DISCONTINUED | OUTPATIENT
Start: 2024-01-01 | End: 2024-01-01

## 2024-01-01 RX ORDER — METOPROLOL TARTRATE 25 MG/1
12.5 TABLET, FILM COATED ORAL 2 TIMES DAILY
Status: DISCONTINUED | OUTPATIENT
Start: 2024-01-01 | End: 2024-01-01

## 2024-01-01 RX ORDER — GUAIFENESIN 400 MG/1
400 TABLET ORAL 2 TIMES DAILY
COMMUNITY
Start: 2024-01-01 | End: 2024-01-01

## 2024-01-01 RX ADMIN — AMLODIPINE BESYLATE 5 MG: 5 TABLET ORAL at 08:26

## 2024-01-01 RX ADMIN — AMLODIPINE BESYLATE 5 MG: 5 TABLET ORAL at 08:39

## 2024-01-01 RX ADMIN — RIVAROXABAN 20 MG: 20 TABLET, FILM COATED ORAL at 10:04

## 2024-01-01 RX ADMIN — PREDNISONE 10 MG: 10 TABLET ORAL at 10:20

## 2024-01-01 RX ADMIN — INSULIN LISPRO 1 UNITS: 100 INJECTION, SOLUTION INTRAVENOUS; SUBCUTANEOUS at 08:14

## 2024-01-01 RX ADMIN — SODIUM CHLORIDE SOLN NEBU 3% 3 ML: 3 NEBU SOLN at 06:16

## 2024-01-01 RX ADMIN — PANTOPRAZOLE SODIUM 40 MG: 40 TABLET, DELAYED RELEASE ORAL at 09:07

## 2024-01-01 RX ADMIN — METHYLPREDNISOLONE 8 MG: 4 TABLET ORAL at 08:26

## 2024-01-01 RX ADMIN — CEFEPIME 2 G: 1 INJECTION, SOLUTION INTRAVENOUS at 04:43

## 2024-01-01 RX ADMIN — PREDNISONE 20 MG: 20 TABLET ORAL at 16:04

## 2024-01-01 RX ADMIN — METOPROLOL TARTRATE 5 MG: 5 INJECTION INTRAVENOUS at 02:16

## 2024-01-01 RX ADMIN — SODIUM CHLORIDE SOLN NEBU 3% 3 ML: 3 NEBU SOLN at 19:52

## 2024-01-01 RX ADMIN — METOPROLOL TARTRATE 12.5 MG: 25 TABLET, FILM COATED ORAL at 20:29

## 2024-01-01 RX ADMIN — ACETAMINOPHEN 650 MG: 325 TABLET ORAL at 20:13

## 2024-01-01 RX ADMIN — ACETAMINOPHEN 650 MG: 325 TABLET ORAL at 20:57

## 2024-01-01 RX ADMIN — SODIUM CHLORIDE 500 ML: 4.5 INJECTION, SOLUTION INTRAVENOUS at 07:22

## 2024-01-01 RX ADMIN — INSULIN LISPRO 1 UNITS: 100 INJECTION, SOLUTION INTRAVENOUS; SUBCUTANEOUS at 12:56

## 2024-01-01 RX ADMIN — CEFTRIAXONE SODIUM 2 G: 2 INJECTION, SOLUTION INTRAVENOUS at 10:05

## 2024-01-01 RX ADMIN — ACETAMINOPHEN 650 MG: 325 TABLET ORAL at 21:16

## 2024-01-01 RX ADMIN — SODIUM CHLORIDE, POTASSIUM CHLORIDE, SODIUM LACTATE AND CALCIUM CHLORIDE 1000 ML: 600; 310; 30; 20 INJECTION, SOLUTION INTRAVENOUS at 09:34

## 2024-01-01 RX ADMIN — DEXTROSE AND SODIUM CHLORIDE 100 ML/HR: 5; 450 INJECTION, SOLUTION INTRAVENOUS at 10:04

## 2024-01-01 RX ADMIN — DEXTROSE AND SODIUM CHLORIDE 100 ML/HR: 5; 450 INJECTION, SOLUTION INTRAVENOUS at 07:23

## 2024-01-01 RX ADMIN — METOPROLOL TARTRATE 12.5 MG: 25 TABLET, FILM COATED ORAL at 09:07

## 2024-01-01 RX ADMIN — AZITHROMYCIN MONOHYDRATE 500 MG: 500 INJECTION, POWDER, LYOPHILIZED, FOR SOLUTION INTRAVENOUS at 07:02

## 2024-01-01 RX ADMIN — PHYTONADIONE 10 MG: 10 INJECTION, EMULSION INTRAMUSCULAR; INTRAVENOUS; SUBCUTANEOUS at 18:43

## 2024-01-01 RX ADMIN — ACETAMINOPHEN 650 MG: 325 TABLET ORAL at 21:28

## 2024-01-01 RX ADMIN — INSULIN LISPRO 3 UNITS: 100 INJECTION, SOLUTION INTRAVENOUS; SUBCUTANEOUS at 17:03

## 2024-01-01 RX ADMIN — INSULIN LISPRO 1 UNITS: 100 INJECTION, SOLUTION INTRAVENOUS; SUBCUTANEOUS at 12:00

## 2024-01-01 RX ADMIN — ACETAMINOPHEN 650 MG: 325 TABLET ORAL at 01:58

## 2024-01-01 RX ADMIN — PREDNISONE 5 MG: 10 TABLET ORAL at 09:07

## 2024-01-01 RX ADMIN — RIVAROXABAN 20 MG: 20 TABLET, FILM COATED ORAL at 08:48

## 2024-01-01 RX ADMIN — CEFEPIME 2 G: 1 INJECTION, SOLUTION INTRAVENOUS at 15:51

## 2024-01-01 RX ADMIN — MORPHINE SULFATE 2 MG: 2 INJECTION, SOLUTION INTRAMUSCULAR; INTRAVENOUS at 22:24

## 2024-01-01 RX ADMIN — DEXTROSE AND SODIUM CHLORIDE 100 ML/HR: 5; 450 INJECTION, SOLUTION INTRAVENOUS at 20:50

## 2024-01-01 RX ADMIN — METOPROLOL TARTRATE 5 MG: 5 INJECTION INTRAVENOUS at 10:04

## 2024-01-01 RX ADMIN — MORPHINE SULFATE 2 MG: 2 INJECTION, SOLUTION INTRAMUSCULAR; INTRAVENOUS at 04:44

## 2024-01-01 RX ADMIN — SODIUM CHLORIDE SOLN NEBU 3% 3 ML: 3 NEBU SOLN at 12:34

## 2024-01-01 RX ADMIN — SODIUM CHLORIDE SOLN NEBU 3% 3 ML: 3 NEBU SOLN at 07:24

## 2024-01-01 RX ADMIN — Medication 5 MG: at 20:31

## 2024-01-01 RX ADMIN — Medication 30 G: at 20:00

## 2024-01-01 RX ADMIN — ACETAMINOPHEN 650 MG: 325 TABLET ORAL at 20:31

## 2024-01-01 RX ADMIN — RIVAROXABAN 20 MG: 20 TABLET, FILM COATED ORAL at 08:38

## 2024-01-01 RX ADMIN — AZITHROMYCIN MONOHYDRATE 500 MG: 500 INJECTION, POWDER, LYOPHILIZED, FOR SOLUTION INTRAVENOUS at 09:41

## 2024-01-01 RX ADMIN — MORPHINE SULFATE 2 MG: 2 INJECTION, SOLUTION INTRAMUSCULAR; INTRAVENOUS at 18:05

## 2024-01-01 RX ADMIN — RIVAROXABAN 20 MG: 20 TABLET, FILM COATED ORAL at 08:57

## 2024-01-01 RX ADMIN — LEVOFLOXACIN 500 MG: 500 TABLET, FILM COATED ORAL at 13:47

## 2024-01-01 RX ADMIN — SODIUM CHLORIDE 2448 ML: 9 INJECTION, SOLUTION INTRAVENOUS at 09:40

## 2024-01-01 RX ADMIN — CEFEPIME 2 G: 1 INJECTION, SOLUTION INTRAVENOUS at 03:57

## 2024-01-01 RX ADMIN — INSULIN LISPRO 2 UNITS: 100 INJECTION, SOLUTION INTRAVENOUS; SUBCUTANEOUS at 12:35

## 2024-01-01 RX ADMIN — METHYLPREDNISOLONE 4 MG: 4 TABLET ORAL at 08:18

## 2024-01-01 RX ADMIN — CEFEPIME 2 G: 1 INJECTION, SOLUTION INTRAVENOUS at 09:40

## 2024-01-01 RX ADMIN — RIVAROXABAN 20 MG: 20 TABLET, FILM COATED ORAL at 08:18

## 2024-01-01 RX ADMIN — PANTOPRAZOLE SODIUM 40 MG: 40 INJECTION, POWDER, FOR SOLUTION INTRAVENOUS at 08:52

## 2024-01-01 RX ADMIN — ACETAMINOPHEN 650 MG: 325 TABLET ORAL at 17:18

## 2024-01-01 RX ADMIN — PANTOPRAZOLE SODIUM 40 MG: 40 INJECTION, POWDER, FOR SOLUTION INTRAVENOUS at 06:05

## 2024-01-01 RX ADMIN — CEFEPIME 2 G: 1 INJECTION, SOLUTION INTRAVENOUS at 16:09

## 2024-01-01 RX ADMIN — Medication 30 G: at 08:39

## 2024-01-01 RX ADMIN — ACETAMINOPHEN 650 MG: 325 TABLET ORAL at 08:22

## 2024-01-01 RX ADMIN — RIVAROXABAN 20 MG: 20 TABLET, FILM COATED ORAL at 18:17

## 2024-01-01 RX ADMIN — METHYLPREDNISOLONE 24 MG: 4 TABLET ORAL at 14:19

## 2024-01-01 RX ADMIN — LIDOCAINE 4% 1 PATCH: 40 PATCH TOPICAL at 08:48

## 2024-01-01 RX ADMIN — IOHEXOL 75 ML: 350 INJECTION, SOLUTION INTRAVENOUS at 18:23

## 2024-01-01 RX ADMIN — Medication 5 MG: at 21:28

## 2024-01-01 RX ADMIN — AMLODIPINE BESYLATE 5 MG: 5 TABLET ORAL at 09:15

## 2024-01-01 RX ADMIN — CEFEPIME 2 G: 1 INJECTION, SOLUTION INTRAVENOUS at 04:18

## 2024-01-01 RX ADMIN — CEFEPIME 2 G: 1 INJECTION, SOLUTION INTRAVENOUS at 17:03

## 2024-01-01 RX ADMIN — LEVOFLOXACIN 500 MG: 500 TABLET, FILM COATED ORAL at 13:53

## 2024-01-01 RX ADMIN — LIDOCAINE 4% 1 PATCH: 40 PATCH TOPICAL at 08:57

## 2024-01-01 RX ADMIN — CEFEPIME 2 G: 1 INJECTION, SOLUTION INTRAVENOUS at 15:57

## 2024-01-01 RX ADMIN — ACETAMINOPHEN 650 MG: 325 TABLET ORAL at 08:31

## 2024-01-01 RX ADMIN — Medication 40 PERCENT: at 04:15

## 2024-01-01 RX ADMIN — MORPHINE SULFATE 2 MG: 2 INJECTION, SOLUTION INTRAMUSCULAR; INTRAVENOUS at 00:58

## 2024-01-01 RX ADMIN — Medication 5 MG: at 20:13

## 2024-01-01 RX ADMIN — CEFTRIAXONE SODIUM 2 G: 2 INJECTION, SOLUTION INTRAVENOUS at 07:31

## 2024-01-01 RX ADMIN — RIVAROXABAN 20 MG: 20 TABLET, FILM COATED ORAL at 08:23

## 2024-01-01 RX ADMIN — SODIUM CHLORIDE, POTASSIUM CHLORIDE, SODIUM LACTATE AND CALCIUM CHLORIDE 1000 ML: 600; 310; 30; 20 INJECTION, SOLUTION INTRAVENOUS at 08:51

## 2024-01-01 RX ADMIN — MORPHINE SULFATE 2 MG: 2 INJECTION, SOLUTION INTRAMUSCULAR; INTRAVENOUS at 15:13

## 2024-01-01 RX ADMIN — AZITHROMYCIN MONOHYDRATE 500 MG: 500 INJECTION, POWDER, LYOPHILIZED, FOR SOLUTION INTRAVENOUS at 10:05

## 2024-01-01 RX ADMIN — ACETAMINOPHEN 650 MG: 325 TABLET ORAL at 08:39

## 2024-01-01 RX ADMIN — METHYLPREDNISOLONE 12 MG: 4 TABLET ORAL at 08:57

## 2024-01-01 RX ADMIN — LIDOCAINE 4% 1 PATCH: 40 PATCH TOPICAL at 13:08

## 2024-01-01 RX ADMIN — SODIUM BICARBONATE 650 MG TABLET 650 MG: at 12:23

## 2024-01-01 RX ADMIN — MIRTAZAPINE 15 MG: 15 TABLET, ORALLY DISINTEGRATING ORAL at 20:00

## 2024-01-01 RX ADMIN — ACETAMINOPHEN 650 MG: 325 TABLET ORAL at 22:48

## 2024-01-01 RX ADMIN — LEVOFLOXACIN 500 MG: 500 TABLET, FILM COATED ORAL at 12:23

## 2024-01-01 RX ADMIN — LIDOCAINE 4% 1 PATCH: 40 PATCH TOPICAL at 08:38

## 2024-01-01 RX ADMIN — RIVAROXABAN 20 MG: 20 TABLET, FILM COATED ORAL at 09:15

## 2024-01-01 RX ADMIN — METOPROLOL TARTRATE 5 MG: 5 INJECTION INTRAVENOUS at 20:12

## 2024-01-01 RX ADMIN — VANCOMYCIN HYDROCHLORIDE 2 G: 10 INJECTION, POWDER, LYOPHILIZED, FOR SOLUTION INTRAVENOUS at 10:09

## 2024-01-01 RX ADMIN — Medication 5 MG: at 00:12

## 2024-01-01 RX ADMIN — PANTOPRAZOLE SODIUM 40 MG: 40 INJECTION, POWDER, FOR SOLUTION INTRAVENOUS at 06:54

## 2024-01-01 RX ADMIN — LEVOFLOXACIN 500 MG: 500 TABLET, FILM COATED ORAL at 14:00

## 2024-01-01 RX ADMIN — ACETAMINOPHEN 650 MG: 325 TABLET ORAL at 11:49

## 2024-01-01 RX ADMIN — CEFEPIME 2 G: 1 INJECTION, SOLUTION INTRAVENOUS at 04:03

## 2024-01-01 RX ADMIN — Medication 50 L/MIN: at 20:15

## 2024-01-01 RX ADMIN — Medication 5 MG: at 20:00

## 2024-01-01 RX ADMIN — CEFTRIAXONE SODIUM 2 G: 2 INJECTION, SOLUTION INTRAVENOUS at 06:32

## 2024-01-01 RX ADMIN — PANTOPRAZOLE SODIUM 40 MG: 40 INJECTION, POWDER, FOR SOLUTION INTRAVENOUS at 06:24

## 2024-01-01 RX ADMIN — AMLODIPINE BESYLATE 5 MG: 5 TABLET ORAL at 10:05

## 2024-01-01 RX ADMIN — AMLODIPINE BESYLATE 5 MG: 5 TABLET ORAL at 18:17

## 2024-01-01 RX ADMIN — Medication 5 MG: at 22:48

## 2024-01-01 RX ADMIN — MORPHINE SULFATE 2 MG: 2 INJECTION, SOLUTION INTRAMUSCULAR; INTRAVENOUS at 01:39

## 2024-01-01 RX ADMIN — LIDOCAINE 4% 1 PATCH: 40 PATCH TOPICAL at 10:04

## 2024-01-01 RX ADMIN — PHYTONADIONE 10 MG: 10 INJECTION, EMULSION INTRAMUSCULAR; INTRAVENOUS; SUBCUTANEOUS at 10:45

## 2024-01-01 RX ADMIN — LEVOFLOXACIN 500 MG: 500 TABLET, FILM COATED ORAL at 14:16

## 2024-01-01 RX ADMIN — AMLODIPINE BESYLATE 5 MG: 5 TABLET ORAL at 08:48

## 2024-01-01 RX ADMIN — SODIUM CHLORIDE SOLN NEBU 3% 3 ML: 3 NEBU SOLN at 00:45

## 2024-01-01 RX ADMIN — MORPHINE SULFATE 2 MG: 2 INJECTION, SOLUTION INTRAMUSCULAR; INTRAVENOUS at 12:38

## 2024-01-01 RX ADMIN — LIDOCAINE 4% 1 PATCH: 40 PATCH TOPICAL at 08:39

## 2024-01-01 RX ADMIN — LIDOCAINE 4% 1 PATCH: 40 PATCH TOPICAL at 08:18

## 2024-01-01 RX ADMIN — LIDOCAINE 4% 1 PATCH: 40 PATCH TOPICAL at 08:25

## 2024-01-01 RX ADMIN — RIVAROXABAN 20 MG: 20 TABLET, FILM COATED ORAL at 08:26

## 2024-01-01 RX ADMIN — ACETAMINOPHEN 650 MG: 325 TABLET ORAL at 00:10

## 2024-01-01 RX ADMIN — Medication 30 G: at 12:31

## 2024-01-01 RX ADMIN — SODIUM CHLORIDE SOLN NEBU 3% 3 ML: 3 NEBU SOLN at 18:46

## 2024-01-01 RX ADMIN — LEVOFLOXACIN 500 MG: 500 TABLET, FILM COATED ORAL at 13:26

## 2024-01-01 RX ADMIN — AMLODIPINE BESYLATE 5 MG: 5 TABLET ORAL at 08:57

## 2024-01-01 RX ADMIN — INSULIN LISPRO 1 UNITS: 100 INJECTION, SOLUTION INTRAVENOUS; SUBCUTANEOUS at 18:50

## 2024-01-01 RX ADMIN — SODIUM CHLORIDE SOLN NEBU 3% 3 ML: 3 NEBU SOLN at 18:32

## 2024-01-01 RX ADMIN — Medication 3 L/MIN: at 12:15

## 2024-01-01 RX ADMIN — LEVOFLOXACIN 500 MG: 500 TABLET, FILM COATED ORAL at 13:28

## 2024-01-01 RX ADMIN — MORPHINE SULFATE 2 MG: 2 INJECTION, SOLUTION INTRAMUSCULAR; INTRAVENOUS at 22:04

## 2024-01-01 RX ADMIN — Medication 40 PERCENT: at 00:15

## 2024-01-01 RX ADMIN — ACETAMINOPHEN 650 MG: 325 TABLET ORAL at 16:04

## 2024-01-01 RX ADMIN — Medication 21 L/MIN: at 14:30

## 2024-01-01 RX ADMIN — SODIUM CHLORIDE SOLN NEBU 3% 3 ML: 3 NEBU SOLN at 12:01

## 2024-01-01 RX ADMIN — METHYLPREDNISOLONE 16 MG: 4 TABLET ORAL at 09:04

## 2024-01-01 RX ADMIN — INSULIN LISPRO 1 UNITS: 100 INJECTION, SOLUTION INTRAVENOUS; SUBCUTANEOUS at 16:16

## 2024-01-01 RX ADMIN — METOPROLOL TARTRATE 12.5 MG: 25 TABLET, FILM COATED ORAL at 10:20

## 2024-01-01 RX ADMIN — Medication 40 PERCENT: at 07:00

## 2024-01-01 RX ADMIN — AMLODIPINE BESYLATE 5 MG: 5 TABLET ORAL at 08:23

## 2024-01-01 RX ADMIN — CEFEPIME 2 G: 1 INJECTION, SOLUTION INTRAVENOUS at 18:42

## 2024-01-01 RX ADMIN — ACETAMINOPHEN 650 MG: 325 TABLET ORAL at 16:34

## 2024-01-01 RX ADMIN — INSULIN LISPRO 1 UNITS: 100 INJECTION, SOLUTION INTRAVENOUS; SUBCUTANEOUS at 07:57

## 2024-01-01 RX ADMIN — SODIUM CHLORIDE SOLN NEBU 3% 3 ML: 3 NEBU SOLN at 07:07

## 2024-01-01 RX ADMIN — METHYLPREDNISOLONE 20 MG: 4 TABLET ORAL at 10:47

## 2024-01-01 RX ADMIN — PREDNISONE 20 MG: 20 TABLET ORAL at 08:01

## 2024-01-01 RX ADMIN — PREDNISONE 20 MG: 20 TABLET ORAL at 08:38

## 2024-01-01 RX ADMIN — ACETAMINOPHEN 650 MG: 325 TABLET ORAL at 19:42

## 2024-01-01 RX ADMIN — AZITHROMYCIN MONOHYDRATE 500 MG: 500 INJECTION, POWDER, LYOPHILIZED, FOR SOLUTION INTRAVENOUS at 07:43

## 2024-01-01 RX ADMIN — PREDNISONE 20 MG: 20 TABLET ORAL at 08:23

## 2024-01-01 RX ADMIN — IOHEXOL 75 ML: 350 INJECTION, SOLUTION INTRAVENOUS at 09:56

## 2024-01-01 RX ADMIN — MORPHINE SULFATE 2 MG: 2 INJECTION, SOLUTION INTRAMUSCULAR; INTRAVENOUS at 01:27

## 2024-01-01 RX ADMIN — AMLODIPINE BESYLATE 5 MG: 5 TABLET ORAL at 08:18

## 2024-01-01 RX ADMIN — SODIUM CHLORIDE SOLN NEBU 3% 3 ML: 3 NEBU SOLN at 01:48

## 2024-01-01 RX ADMIN — Medication 5 MG: at 21:16

## 2024-01-01 RX ADMIN — LIDOCAINE 4% 1 PATCH: 40 PATCH TOPICAL at 09:15

## 2024-01-01 SDOH — SOCIAL STABILITY: SOCIAL INSECURITY: HAVE YOU HAD THOUGHTS OF HARMING ANYONE ELSE?: NO

## 2024-01-01 SDOH — SOCIAL STABILITY: SOCIAL INSECURITY: ARE THERE ANY APPARENT SIGNS OF INJURIES/BEHAVIORS THAT COULD BE RELATED TO ABUSE/NEGLECT?: NO

## 2024-01-01 SDOH — SOCIAL STABILITY: SOCIAL INSECURITY: ARE THERE ANY APPARENT SIGNS OF INJURIES/BEHAVIORS THAT COULD BE RELATED TO ABUSE/NEGLECT?: UNABLE TO ASSESS

## 2024-01-01 SDOH — SOCIAL STABILITY: SOCIAL INSECURITY: DOES ANYONE TRY TO KEEP YOU FROM HAVING/CONTACTING OTHER FRIENDS OR DOING THINGS OUTSIDE YOUR HOME?: UNABLE TO ASSESS

## 2024-01-01 SDOH — SOCIAL STABILITY: SOCIAL INSECURITY: HAVE YOU HAD ANY THOUGHTS OF HARMING ANYONE ELSE?: NO

## 2024-01-01 SDOH — SOCIAL STABILITY: SOCIAL INSECURITY: ABUSE: ADULT

## 2024-01-01 SDOH — SOCIAL STABILITY: SOCIAL INSECURITY: ARE YOU OR HAVE YOU BEEN THREATENED OR ABUSED PHYSICALLY, EMOTIONALLY, OR SEXUALLY BY ANYONE?: UNABLE TO ASSESS

## 2024-01-01 SDOH — SOCIAL STABILITY: SOCIAL INSECURITY: DOES ANYONE TRY TO KEEP YOU FROM HAVING/CONTACTING OTHER FRIENDS OR DOING THINGS OUTSIDE YOUR HOME?: NO

## 2024-01-01 SDOH — SOCIAL STABILITY: SOCIAL INSECURITY: HAS ANYONE EVER THREATENED TO HURT YOUR FAMILY OR YOUR PETS?: UNABLE TO ASSESS

## 2024-01-01 SDOH — SOCIAL STABILITY: SOCIAL INSECURITY: WERE YOU ABLE TO COMPLETE ALL THE BEHAVIORAL HEALTH SCREENINGS?: NO

## 2024-01-01 SDOH — SOCIAL STABILITY: SOCIAL INSECURITY: DO YOU FEEL ANYONE HAS EXPLOITED OR TAKEN ADVANTAGE OF YOU FINANCIALLY OR OF YOUR PERSONAL PROPERTY?: NO

## 2024-01-01 SDOH — SOCIAL STABILITY: SOCIAL INSECURITY: HAVE YOU HAD ANY THOUGHTS OF HARMING ANYONE ELSE?: UNABLE TO ASSESS

## 2024-01-01 SDOH — SOCIAL STABILITY: SOCIAL INSECURITY: HAVE YOU HAD THOUGHTS OF HARMING ANYONE ELSE?: UNABLE TO ASSESS

## 2024-01-01 SDOH — SOCIAL STABILITY: SOCIAL INSECURITY: DO YOU FEEL UNSAFE GOING BACK TO THE PLACE WHERE YOU ARE LIVING?: UNABLE TO ASSESS

## 2024-01-01 SDOH — SOCIAL STABILITY: SOCIAL INSECURITY: WERE YOU ABLE TO COMPLETE ALL THE BEHAVIORAL HEALTH SCREENINGS?: YES

## 2024-01-01 SDOH — SOCIAL STABILITY: SOCIAL INSECURITY: DO YOU FEEL UNSAFE GOING BACK TO THE PLACE WHERE YOU ARE LIVING?: NO

## 2024-01-01 SDOH — SOCIAL STABILITY: SOCIAL INSECURITY: ARE YOU OR HAVE YOU BEEN THREATENED OR ABUSED PHYSICALLY, EMOTIONALLY, OR SEXUALLY BY ANYONE?: NO

## 2024-01-01 SDOH — SOCIAL STABILITY: SOCIAL INSECURITY: HAS ANYONE EVER THREATENED TO HURT YOUR FAMILY OR YOUR PETS?: NO

## 2024-01-01 SDOH — SOCIAL STABILITY: SOCIAL INSECURITY: DO YOU FEEL ANYONE HAS EXPLOITED OR TAKEN ADVANTAGE OF YOU FINANCIALLY OR OF YOUR PERSONAL PROPERTY?: UNABLE TO ASSESS

## 2024-01-01 ASSESSMENT — PAIN SCALES - PAIN ASSESSMENT IN ADVANCED DEMENTIA (PAINAD)
FACIALEXPRESSION: FACIAL GRIMACING
FACIALEXPRESSION: SMILING OR INEXPRESSIVE
CONSOLABILITY: NO NEED TO CONSOLE
TOTALSCORE: 0
CONSOLABILITY: NO NEED TO CONSOLE
BREATHING: NORMAL
TOTALSCORE: 5
BREATHING: NORMAL
TOTALSCORE: 6
TOTALSCORE: MEDICATION (SEE MAR)
TOTALSCORE: 5
CONSOLABILITY: NO NEED TO CONSOLE
TOTALSCORE: 0
FACIALEXPRESSION: SMILING OR INEXPRESSIVE
FACIALEXPRESSION: SMILING OR INEXPRESSIVE
BODYLANGUAGE: RELAXED
CONSOLABILITY: NO NEED TO CONSOLE
BODYLANGUAGE: TENSE, DISTRESSED PACING, FIDGETING
BODYLANGUAGE: RELAXED
TOTALSCORE: 1
BODYLANGUAGE: RELAXED
FACIALEXPRESSION: SAD, FRIGHTENED, FROWN
CONSOLABILITY: NO NEED TO CONSOLE
BODYLANGUAGE: TENSE, DISTRESSED PACING, FIDGETING
TOTALSCORE: MEDICATION (SEE MAR)
TOTALSCORE: 0
BODYLANGUAGE: TENSE, DISTRESSED PACING, FIDGETING
TOTALSCORE: MEDICATION (SEE MAR)
CONSOLABILITY: NO NEED TO CONSOLE
BODYLANGUAGE: RELAXED
NEGVOCALIZATION: OCCASIONAL MOAN/GROAN, LOW SPEECH, NEGATIVE/DISAPPROVING QUALITY
BODYLANGUAGE: TENSE, DISTRESSED PACING, FIDGETING
CONSOLABILITY: NO NEED TO CONSOLE
FACIALEXPRESSION: FACIAL GRIMACING
TOTALSCORE: COLD APPLIED
BODYLANGUAGE: RELAXED
NEGVOCALIZATION: OCCASIONAL MOAN/GROAN, LOW SPEECH, NEGATIVE/DISAPPROVING QUALITY
TOTALSCORE: DECREASE IN PAIN
FACIALEXPRESSION: SMILING OR INEXPRESSIVE
FACIALEXPRESSION: SAD, FRIGHTENED, FROWN
BODYLANGUAGE: RELAXED
BREATHING: OCCASIONAL LABORED BREATHING, SHORT PERIOD OF HYPERVENTILATION
CONSOLABILITY: NO NEED TO CONSOLE
TOTALSCORE: 1
FACIALEXPRESSION: SMILING OR INEXPRESSIVE
CONSOLABILITY: NO NEED TO CONSOLE
TOTALSCORE: 0
BODYLANGUAGE: RELAXED
BREATHING: NORMAL
BREATHING: NOISY LABORED BREATHING, LONG PERIODS OF HYPERVENTILATION, CHEYNE-STOKES RESPIRATIONS
BREATHING: NOISY LABORED BREATHING, LONG PERIODS OF HYPERVENTILATION, CHEYNE-STOKES RESPIRATIONS
FACIALEXPRESSION: SMILING OR INEXPRESSIVE
TOTALSCORE: 2
BREATHING: NORMAL
BODYLANGUAGE: RELAXED
TOTALSCORE: MEDICATION (SEE MAR)
TOTALSCORE: 0
NEGVOCALIZATION: OCCASIONAL MOAN/GROAN, LOW SPEECH, NEGATIVE/DISAPPROVING QUALITY
NEGVOCALIZATION: OCCASIONAL MOAN/GROAN, LOW SPEECH, NEGATIVE/DISAPPROVING QUALITY
FACIALEXPRESSION: SMILING OR INEXPRESSIVE
BREATHING: NOISY LABORED BREATHING, LONG PERIODS OF HYPERVENTILATION, CHEYNE-STOKES RESPIRATIONS
CONSOLABILITY: NO NEED TO CONSOLE
TOTALSCORE: REPOSITIONED
BREATHING: NOISY LABORED BREATHING, LONG PERIODS OF HYPERVENTILATION, CHEYNE-STOKES RESPIRATIONS
CONSOLABILITY: NO NEED TO CONSOLE
BREATHING: NORMAL
CONSOLABILITY: NO NEED TO CONSOLE
FACIALEXPRESSION: SMILING OR INEXPRESSIVE
TOTALSCORE: 6
TOTALSCORE: RELIEF
CONSOLABILITY: NO NEED TO CONSOLE
TOTALSCORE: 0
BREATHING: OCCASIONAL LABORED BREATHING, SHORT PERIOD OF HYPERVENTILATION
BREATHING: NORMAL
NEGVOCALIZATION: OCCASIONAL MOAN/GROAN, LOW SPEECH, NEGATIVE/DISAPPROVING QUALITY
BREATHING: OCCASIONAL LABORED BREATHING, SHORT PERIOD OF HYPERVENTILATION
FACIALEXPRESSION: SMILING OR INEXPRESSIVE
BODYLANGUAGE: RELAXED

## 2024-01-01 ASSESSMENT — PAIN SCALES - GENERAL
PAINLEVEL_OUTOF10: 8
PAINLEVEL_OUTOF10: 0 - NO PAIN
PAINLEVEL_OUTOF10: 4
PAINLEVEL_OUTOF10: 0 - NO PAIN
PAINLEVEL_OUTOF10: 8
PAINLEVEL_OUTOF10: 0 - NO PAIN
PAINLEVEL_OUTOF10: 5 - MODERATE PAIN
PAINLEVEL_OUTOF10: 9
PAINLEVEL_OUTOF10: 8
PAINLEVEL_OUTOF10: 0 - NO PAIN
PAINLEVEL_OUTOF10: 4
PAINLEVEL_OUTOF10: 0 - NO PAIN
PAINLEVEL_OUTOF10: 3
PAINLEVEL_OUTOF10: 0 - NO PAIN
PAINLEVEL_OUTOF10: 0 - NO PAIN
PAINLEVEL_OUTOF10: 5 - MODERATE PAIN
PAINLEVEL_OUTOF10: 5 - MODERATE PAIN
PAINLEVEL_OUTOF10: 0 - NO PAIN
PAINLEVEL_OUTOF10: 0 - NO PAIN
PAINLEVEL_OUTOF10: 6
PAINLEVEL_OUTOF10: 0 - NO PAIN
PAINLEVEL_OUTOF10: 6
PAINLEVEL_OUTOF10: 6
PAINLEVEL_OUTOF10: 0 - NO PAIN
PAINLEVEL_OUTOF10: 0 - NO PAIN
PAINLEVEL_OUTOF10: 6
PAINLEVEL_OUTOF10: 0 - NO PAIN
PAINLEVEL_OUTOF10: 6
PAINLEVEL_OUTOF10: 0 - NO PAIN
PAINLEVEL_OUTOF10: 6
PAINLEVEL_OUTOF10: 0 - NO PAIN
PAINLEVEL_OUTOF10: 4
PAINLEVEL_OUTOF10: 8
PAINLEVEL_OUTOF10: 7
PAINLEVEL_OUTOF10: 6
PAINLEVEL_OUTOF10: 0 - NO PAIN
PAINLEVEL_OUTOF10: 0 - NO PAIN
PAINLEVEL_OUTOF10: 3
PAINLEVEL_OUTOF10: 2
PAINLEVEL_OUTOF10: 9
PAINLEVEL_OUTOF10: 6
PAINLEVEL_OUTOF10: 3
PAINLEVEL_OUTOF10: 0 - NO PAIN
PAINLEVEL_OUTOF10: 4
PAINLEVEL_OUTOF10: 0 - NO PAIN
PAINLEVEL_OUTOF10: 8
PAINLEVEL_OUTOF10: 6
PAINLEVEL_OUTOF10: 0 - NO PAIN
PAINLEVEL_OUTOF10: 2
PAINLEVEL_OUTOF10: 6

## 2024-01-01 ASSESSMENT — PAIN - FUNCTIONAL ASSESSMENT
PAIN_FUNCTIONAL_ASSESSMENT: 0-10
PAIN_FUNCTIONAL_ASSESSMENT: 0-10
PAIN_FUNCTIONAL_ASSESSMENT: PAINAD (PAIN ASSESSMENT IN ADVANCED DEMENTIA SCALE)
PAIN_FUNCTIONAL_ASSESSMENT: CPOT (CRITICAL CARE PAIN OBSERVATION TOOL)
PAIN_FUNCTIONAL_ASSESSMENT: 0-10
PAIN_FUNCTIONAL_ASSESSMENT: PAINAD (PAIN ASSESSMENT IN ADVANCED DEMENTIA SCALE)
PAIN_FUNCTIONAL_ASSESSMENT: 0-10
PAIN_FUNCTIONAL_ASSESSMENT: 0-10
PAIN_FUNCTIONAL_ASSESSMENT: WONG-BAKER FACES
PAIN_FUNCTIONAL_ASSESSMENT: PAINAD (PAIN ASSESSMENT IN ADVANCED DEMENTIA SCALE)
PAIN_FUNCTIONAL_ASSESSMENT: 0-10
PAIN_FUNCTIONAL_ASSESSMENT: CPOT (CRITICAL CARE PAIN OBSERVATION TOOL)
PAIN_FUNCTIONAL_ASSESSMENT: 0-10
PAIN_FUNCTIONAL_ASSESSMENT: CPOT (CRITICAL CARE PAIN OBSERVATION TOOL)
PAIN_FUNCTIONAL_ASSESSMENT: PAINAD (PAIN ASSESSMENT IN ADVANCED DEMENTIA SCALE)
PAIN_FUNCTIONAL_ASSESSMENT: 0-10
PAIN_FUNCTIONAL_ASSESSMENT: WONG-BAKER FACES
PAIN_FUNCTIONAL_ASSESSMENT: 0-10
PAIN_FUNCTIONAL_ASSESSMENT: 0-10
PAIN_FUNCTIONAL_ASSESSMENT: CPOT (CRITICAL CARE PAIN OBSERVATION TOOL)
PAIN_FUNCTIONAL_ASSESSMENT: 0-10
PAIN_FUNCTIONAL_ASSESSMENT: PAINAD (PAIN ASSESSMENT IN ADVANCED DEMENTIA SCALE)
PAIN_FUNCTIONAL_ASSESSMENT: 0-10
PAIN_FUNCTIONAL_ASSESSMENT: PAINAD (PAIN ASSESSMENT IN ADVANCED DEMENTIA SCALE)
PAIN_FUNCTIONAL_ASSESSMENT: PAINAD (PAIN ASSESSMENT IN ADVANCED DEMENTIA SCALE)

## 2024-01-01 ASSESSMENT — LIFESTYLE VARIABLES
EVER HAD A DRINK FIRST THING IN THE MORNING TO STEADY YOUR NERVES TO GET RID OF A HANGOVER: NO
HOW OFTEN DO YOU HAVE 6 OR MORE DRINKS ON ONE OCCASION: NEVER
HOW OFTEN DO YOU HAVE A DRINK CONTAINING ALCOHOL: NEVER
HOW OFTEN DO YOU HAVE A DRINK CONTAINING ALCOHOL: NEVER
HAVE YOU EVER FELT YOU SHOULD CUT DOWN ON YOUR DRINKING: NO
TOTAL SCORE: 0
HOW MANY STANDARD DRINKS CONTAINING ALCOHOL DO YOU HAVE ON A TYPICAL DAY: PATIENT DOES NOT DRINK
EVER HAD A DRINK FIRST THING IN THE MORNING TO STEADY YOUR NERVES TO GET RID OF A HANGOVER: NO
PRESCIPTION_ABUSE_PAST_12_MONTHS: NO
AUDIT-C TOTAL SCORE: 0
SUBSTANCE_ABUSE_PAST_12_MONTHS: NO
AUDIT-C TOTAL SCORE: 0
HAVE PEOPLE ANNOYED YOU BY CRITICIZING YOUR DRINKING: NO
AUDIT-C TOTAL SCORE: 0
HAVE YOU EVER FELT YOU SHOULD CUT DOWN ON YOUR DRINKING: NO
PRESCIPTION_ABUSE_PAST_12_MONTHS: NO
EVER FELT BAD OR GUILTY ABOUT YOUR DRINKING: NO
TOTAL SCORE: 0
HOW OFTEN DO YOU HAVE 6 OR MORE DRINKS ON ONE OCCASION: NEVER
AUDIT-C TOTAL SCORE: 0
SKIP TO QUESTIONS 9-10: 1
SKIP TO QUESTIONS 9-10: 1
SUBSTANCE_ABUSE_PAST_12_MONTHS: NO
HAVE PEOPLE ANNOYED YOU BY CRITICIZING YOUR DRINKING: NO
EVER FELT BAD OR GUILTY ABOUT YOUR DRINKING: NO
HOW MANY STANDARD DRINKS CONTAINING ALCOHOL DO YOU HAVE ON A TYPICAL DAY: PATIENT DOES NOT DRINK

## 2024-01-01 ASSESSMENT — COGNITIVE AND FUNCTIONAL STATUS - GENERAL
MOBILITY SCORE: 18
CLIMB 3 TO 5 STEPS WITH RAILING: A LITTLE
DAILY ACTIVITIY SCORE: 21
MOVING FROM LYING ON BACK TO SITTING ON SIDE OF FLAT BED WITH BEDRAILS: A LITTLE
MOVING TO AND FROM BED TO CHAIR: A LITTLE
TOILETING: A LITTLE
HELP NEEDED FOR BATHING: A LITTLE
MOVING FROM LYING ON BACK TO SITTING ON SIDE OF FLAT BED WITH BEDRAILS: A LOT
DAILY ACTIVITIY SCORE: 20
TOILETING: A LITTLE
MOBILITY SCORE: 6
MOVING TO AND FROM BED TO CHAIR: A LITTLE
WALKING IN HOSPITAL ROOM: TOTAL
MOVING TO AND FROM BED TO CHAIR: A LITTLE
HELP NEEDED FOR BATHING: TOTAL
STANDING UP FROM CHAIR USING ARMS: A LITTLE
CLIMB 3 TO 5 STEPS WITH RAILING: TOTAL
STANDING UP FROM CHAIR USING ARMS: A LITTLE
PATIENT BASELINE BEDBOUND: NO
HELP NEEDED FOR BATHING: TOTAL
MOBILITY SCORE: 18
TURNING FROM BACK TO SIDE WHILE IN FLAT BAD: TOTAL
TURNING FROM BACK TO SIDE WHILE IN FLAT BAD: TOTAL
MOBILITY SCORE: 18
WALKING IN HOSPITAL ROOM: A LITTLE
MOBILITY SCORE: 20
CLIMB 3 TO 5 STEPS WITH RAILING: A LOT
WALKING IN HOSPITAL ROOM: TOTAL
MOBILITY SCORE: 18
TOILETING: A LITTLE
WALKING IN HOSPITAL ROOM: A LITTLE
HELP NEEDED FOR BATHING: A LITTLE
CLIMB 3 TO 5 STEPS WITH RAILING: TOTAL
DRESSING REGULAR UPPER BODY CLOTHING: A LOT
DRESSING REGULAR LOWER BODY CLOTHING: TOTAL
WALKING IN HOSPITAL ROOM: A LITTLE
TOILETING: A LITTLE
STANDING UP FROM CHAIR USING ARMS: A LITTLE
DRESSING REGULAR UPPER BODY CLOTHING: TOTAL
MOVING FROM LYING ON BACK TO SITTING ON SIDE OF FLAT BED WITH BEDRAILS: A LITTLE
DRESSING REGULAR LOWER BODY CLOTHING: TOTAL
DRESSING REGULAR LOWER BODY CLOTHING: A LITTLE
TURNING FROM BACK TO SIDE WHILE IN FLAT BAD: A LITTLE
STANDING UP FROM CHAIR USING ARMS: A LITTLE
DAILY ACTIVITIY SCORE: 20
DRESSING REGULAR UPPER BODY CLOTHING: A LITTLE
DAILY ACTIVITIY SCORE: 20
STANDING UP FROM CHAIR USING ARMS: A LITTLE
TURNING FROM BACK TO SIDE WHILE IN FLAT BAD: A LITTLE
TURNING FROM BACK TO SIDE WHILE IN FLAT BAD: A LITTLE
WALKING IN HOSPITAL ROOM: TOTAL
TURNING FROM BACK TO SIDE WHILE IN FLAT BAD: A LITTLE
MOVING FROM LYING ON BACK TO SITTING ON SIDE OF FLAT BED WITH BEDRAILS: A LOT
TOILETING: TOTAL
DRESSING REGULAR LOWER BODY CLOTHING: A LITTLE
MOVING TO AND FROM BED TO CHAIR: A LITTLE
STANDING UP FROM CHAIR USING ARMS: TOTAL
TURNING FROM BACK TO SIDE WHILE IN FLAT BAD: A LITTLE
MOVING TO AND FROM BED TO CHAIR: TOTAL
TOILETING: A LITTLE
EATING MEALS: TOTAL
HELP NEEDED FOR BATHING: A LITTLE
DRESSING REGULAR LOWER BODY CLOTHING: A LITTLE
HELP NEEDED FOR BATHING: A LITTLE
WALKING IN HOSPITAL ROOM: A LITTLE
CLIMB 3 TO 5 STEPS WITH RAILING: A LITTLE
MOVING TO AND FROM BED TO CHAIR: A LITTLE
MOVING TO AND FROM BED TO CHAIR: A LITTLE
HELP NEEDED FOR BATHING: A LITTLE
STANDING UP FROM CHAIR USING ARMS: A LITTLE
CLIMB 3 TO 5 STEPS WITH RAILING: A LITTLE
MOBILITY SCORE: 6
TOILETING: TOTAL
PATIENT BASELINE BEDBOUND: NO
WALKING IN HOSPITAL ROOM: A LITTLE
CLIMB 3 TO 5 STEPS WITH RAILING: A LITTLE
DRESSING REGULAR UPPER BODY CLOTHING: A LITTLE
PERSONAL GROOMING: TOTAL
DRESSING REGULAR UPPER BODY CLOTHING: A LITTLE
DAILY ACTIVITIY SCORE: 21
MOVING TO AND FROM BED TO CHAIR: A LITTLE
CLIMB 3 TO 5 STEPS WITH RAILING: TOTAL
PERSONAL GROOMING: A LITTLE
TOILETING: A LOT
HELP NEEDED FOR BATHING: A LITTLE
MOBILITY SCORE: 18
TURNING FROM BACK TO SIDE WHILE IN FLAT BAD: A LITTLE
CLIMB 3 TO 5 STEPS WITH RAILING: A LOT
DRESSING REGULAR UPPER BODY CLOTHING: A LITTLE
WALKING IN HOSPITAL ROOM: TOTAL
DRESSING REGULAR UPPER BODY CLOTHING: A LOT
MOBILITY SCORE: 18
HELP NEEDED FOR BATHING: A LITTLE
WALKING IN HOSPITAL ROOM: A LITTLE
DRESSING REGULAR LOWER BODY CLOTHING: TOTAL
DRESSING REGULAR LOWER BODY CLOTHING: A LITTLE
MOVING TO AND FROM BED TO CHAIR: TOTAL
DRESSING REGULAR UPPER BODY CLOTHING: A LITTLE
TOILETING: A LITTLE
TURNING FROM BACK TO SIDE WHILE IN FLAT BAD: A LOT
TURNING FROM BACK TO SIDE WHILE IN FLAT BAD: A LITTLE
MOVING FROM LYING ON BACK TO SITTING ON SIDE OF FLAT BED WITH BEDRAILS: A LITTLE
MOVING TO AND FROM BED TO CHAIR: A LITTLE
MOVING TO AND FROM BED TO CHAIR: A LITTLE
PERSONAL GROOMING: A LITTLE
MOVING TO AND FROM BED TO CHAIR: TOTAL
WALKING IN HOSPITAL ROOM: A LITTLE
EATING MEALS: A LOT
MOVING TO AND FROM BED TO CHAIR: TOTAL
TURNING FROM BACK TO SIDE WHILE IN FLAT BAD: A LITTLE
CLIMB 3 TO 5 STEPS WITH RAILING: A LITTLE
MOBILITY SCORE: 9
EATING MEALS: TOTAL
MOBILITY SCORE: 8
DRESSING REGULAR LOWER BODY CLOTHING: A LOT
DRESSING REGULAR LOWER BODY CLOTHING: A LITTLE
STANDING UP FROM CHAIR USING ARMS: A LOT
CLIMB 3 TO 5 STEPS WITH RAILING: A LITTLE
DRESSING REGULAR LOWER BODY CLOTHING: A LITTLE
TURNING FROM BACK TO SIDE WHILE IN FLAT BAD: A LITTLE
MOVING FROM LYING ON BACK TO SITTING ON SIDE OF FLAT BED WITH BEDRAILS: TOTAL
MOBILITY SCORE: 18
MOBILITY SCORE: 19
DAILY ACTIVITIY SCORE: 6
EATING MEALS: A LOT
MOVING FROM LYING ON BACK TO SITTING ON SIDE OF FLAT BED WITH BEDRAILS: A LITTLE
STANDING UP FROM CHAIR USING ARMS: A LITTLE
STANDING UP FROM CHAIR USING ARMS: A LITTLE
PERSONAL GROOMING: A LITTLE
MOVING FROM LYING ON BACK TO SITTING ON SIDE OF FLAT BED WITH BEDRAILS: A LITTLE
TOILETING: A LITTLE
CLIMB 3 TO 5 STEPS WITH RAILING: A LITTLE
PERSONAL GROOMING: A LITTLE
STANDING UP FROM CHAIR USING ARMS: A LITTLE
DAILY ACTIVITIY SCORE: 20
PERSONAL GROOMING: TOTAL
MOVING TO AND FROM BED TO CHAIR: A LITTLE
CLIMB 3 TO 5 STEPS WITH RAILING: A LITTLE
TOILETING: A LITTLE
DRESSING REGULAR UPPER BODY CLOTHING: A LITTLE
WALKING IN HOSPITAL ROOM: A LITTLE
TURNING FROM BACK TO SIDE WHILE IN FLAT BAD: A LITTLE
STANDING UP FROM CHAIR USING ARMS: A LITTLE
CLIMB 3 TO 5 STEPS WITH RAILING: TOTAL
WALKING IN HOSPITAL ROOM: A LITTLE
DAILY ACTIVITIY SCORE: 6
MOBILITY SCORE: 19
DRESSING REGULAR UPPER BODY CLOTHING: A LITTLE
WALKING IN HOSPITAL ROOM: A LITTLE
TOILETING: A LITTLE
MOVING TO AND FROM BED TO CHAIR: A LITTLE
STANDING UP FROM CHAIR USING ARMS: A LITTLE
DRESSING REGULAR UPPER BODY CLOTHING: TOTAL
DRESSING REGULAR LOWER BODY CLOTHING: A LITTLE
HELP NEEDED FOR BATHING: A LITTLE
CLIMB 3 TO 5 STEPS WITH RAILING: A LOT
PERSONAL GROOMING: A LOT
HELP NEEDED FOR BATHING: A LOT
MOBILITY SCORE: 18
WALKING IN HOSPITAL ROOM: A LITTLE
TURNING FROM BACK TO SIDE WHILE IN FLAT BAD: A LOT
TOILETING: A LITTLE
DAILY ACTIVITIY SCORE: 12
MOBILITY SCORE: 18
TOILETING: A LITTLE
STANDING UP FROM CHAIR USING ARMS: TOTAL
TOILETING: TOTAL
MOVING TO AND FROM BED TO CHAIR: A LITTLE
MOVING FROM LYING ON BACK TO SITTING ON SIDE OF FLAT BED WITH BEDRAILS: A LITTLE
DAILY ACTIVITIY SCORE: 20
PERSONAL GROOMING: A LOT
MOVING FROM LYING ON BACK TO SITTING ON SIDE OF FLAT BED WITH BEDRAILS: A LITTLE
MOVING FROM LYING ON BACK TO SITTING ON SIDE OF FLAT BED WITH BEDRAILS: TOTAL
DAILY ACTIVITIY SCORE: 19
CLIMB 3 TO 5 STEPS WITH RAILING: A LITTLE
WALKING IN HOSPITAL ROOM: A LITTLE
DRESSING REGULAR LOWER BODY CLOTHING: A LITTLE
TOILETING: A LITTLE
DRESSING REGULAR UPPER BODY CLOTHING: A LITTLE
TURNING FROM BACK TO SIDE WHILE IN FLAT BAD: A LITTLE
HELP NEEDED FOR BATHING: A LITTLE
CLIMB 3 TO 5 STEPS WITH RAILING: A LITTLE
DAILY ACTIVITIY SCORE: 22
STANDING UP FROM CHAIR USING ARMS: A LITTLE
STANDING UP FROM CHAIR USING ARMS: A LITTLE
HELP NEEDED FOR BATHING: TOTAL
STANDING UP FROM CHAIR USING ARMS: TOTAL
DAILY ACTIVITIY SCORE: 20
MOBILITY SCORE: 19
CLIMB 3 TO 5 STEPS WITH RAILING: A LOT
DRESSING REGULAR LOWER BODY CLOTHING: A LITTLE
HELP NEEDED FOR BATHING: A LITTLE
MOVING TO AND FROM BED TO CHAIR: A LITTLE
MOBILITY SCORE: 18
DAILY ACTIVITIY SCORE: 19
MOVING FROM LYING ON BACK TO SITTING ON SIDE OF FLAT BED WITH BEDRAILS: A LITTLE
DRESSING REGULAR LOWER BODY CLOTHING: A LITTLE
DAILY ACTIVITIY SCORE: 9
PERSONAL GROOMING: A LITTLE
MOVING TO AND FROM BED TO CHAIR: A LITTLE
DAILY ACTIVITIY SCORE: 19
MOVING FROM LYING ON BACK TO SITTING ON SIDE OF FLAT BED WITH BEDRAILS: A LITTLE

## 2024-01-01 ASSESSMENT — ENCOUNTER SYMPTOMS
DIZZINESS: 0
SHORTNESS OF BREATH: 0
NEUROLOGICAL NEGATIVE: 1
BLURRED VISION: 0
BLOATING: 0
MUSCLE CRAMPS: 0
VOMITING: 0
LIGHT-HEADEDNESS: 0
ENDOCRINE NEGATIVE: 1
COUGH: 1
STIFFNESS: 0
CONSTITUTIONAL NEGATIVE: 1
PALPITATIONS: 0
COUGH: 1
EYES NEGATIVE: 1
NAIL CHANGES: 0
FOCAL WEAKNESS: 0
DYSPNEA ON EXERTION: 0
IRREGULAR HEARTBEAT: 0
CARDIOVASCULAR NEGATIVE: 1
MYALGIAS: 1
DOUBLE VISION: 0
FREQUENCY: 0
NAUSEA: 0
ALTERED MENTAL STATUS: 0
BACK PAIN: 1
POOR WOUND HEALING: 0
ALLERGIC/IMMUNOLOGIC NEGATIVE: 1
MYALGIAS: 0
PSYCHIATRIC NEGATIVE: 1
ORTHOPNEA: 0
GASTROINTESTINAL NEGATIVE: 1
WEAKNESS: 0
DECREASED APPETITE: 0
HEMATOLOGIC/LYMPHATIC NEGATIVE: 1

## 2024-01-01 ASSESSMENT — COLUMBIA-SUICIDE SEVERITY RATING SCALE - C-SSRS
6. HAVE YOU EVER DONE ANYTHING, STARTED TO DO ANYTHING, OR PREPARED TO DO ANYTHING TO END YOUR LIFE?: NO
6. HAVE YOU EVER DONE ANYTHING, STARTED TO DO ANYTHING, OR PREPARED TO DO ANYTHING TO END YOUR LIFE?: NO
1. SINCE LAST CONTACT, HAVE YOU WISHED YOU WERE DEAD OR WISHED YOU COULD GO TO SLEEP AND NOT WAKE UP?: NO
1. IN THE PAST MONTH, HAVE YOU WISHED YOU WERE DEAD OR WISHED YOU COULD GO TO SLEEP AND NOT WAKE UP?: NO
2. HAVE YOU ACTUALLY HAD ANY THOUGHTS OF KILLING YOURSELF?: NO
6. HAVE YOU EVER DONE ANYTHING, STARTED TO DO ANYTHING, OR PREPARED TO DO ANYTHING TO END YOUR LIFE?: NO
2. HAVE YOU ACTUALLY HAD ANY THOUGHTS OF KILLING YOURSELF?: NO
2. HAVE YOU ACTUALLY HAD ANY THOUGHTS OF KILLING YOURSELF?: NO
1. SINCE LAST CONTACT, HAVE YOU WISHED YOU WERE DEAD OR WISHED YOU COULD GO TO SLEEP AND NOT WAKE UP?: NO

## 2024-01-01 ASSESSMENT — ACTIVITIES OF DAILY LIVING (ADL)
HEARING - LEFT EAR: FUNCTIONAL
DRESSING YOURSELF: INDEPENDENT
ASSISTIVE_DEVICE: WALKER;WHEELCHAIR
HEARING - RIGHT EAR: FUNCTIONAL
GROOMING: INDEPENDENT
WALKS IN HOME: DEPENDENT
TOILETING: DEPENDENT
PATIENT'S MEMORY ADEQUATE TO SAFELY COMPLETE DAILY ACTIVITIES?: NO
JUDGMENT_ADEQUATE_SAFELY_COMPLETE_DAILY_ACTIVITIES: YES
ADEQUATE_TO_COMPLETE_ADL: UNABLE TO ASSESS
BATHING: DEPENDENT
LACK_OF_TRANSPORTATION: NO
HEARING - LEFT EAR: FUNCTIONAL
WALKS IN HOME: NEEDS ASSISTANCE
LACK_OF_TRANSPORTATION: NO
BATHING: NEEDS ASSISTANCE
GROOMING: DEPENDENT
JUDGMENT_ADEQUATE_SAFELY_COMPLETE_DAILY_ACTIVITIES: NO
PATIENT'S MEMORY ADEQUATE TO SAFELY COMPLETE DAILY ACTIVITIES?: YES
FEEDING YOURSELF: INDEPENDENT
TOILETING: NEEDS ASSISTANCE
FEEDING YOURSELF: DEPENDENT
HOME_MANAGEMENT_TIME_ENTRY: 9
ADEQUATE_TO_COMPLETE_ADL: YES
DRESSING YOURSELF: DEPENDENT
HEARING - RIGHT EAR: FUNCTIONAL

## 2024-01-01 ASSESSMENT — PAIN DESCRIPTION - ORIENTATION
ORIENTATION: RIGHT
ORIENTATION: LEFT
ORIENTATION: ANTERIOR
ORIENTATION: RIGHT
ORIENTATION: MID
ORIENTATION: RIGHT;LEFT

## 2024-01-01 ASSESSMENT — PATIENT HEALTH QUESTIONNAIRE - PHQ9
1. LITTLE INTEREST OR PLEASURE IN DOING THINGS: NOT AT ALL
2. FEELING DOWN, DEPRESSED OR HOPELESS: NOT AT ALL
1. LITTLE INTEREST OR PLEASURE IN DOING THINGS: NOT AT ALL
2. FEELING DOWN, DEPRESSED OR HOPELESS: NOT AT ALL
SUM OF ALL RESPONSES TO PHQ9 QUESTIONS 1 & 2: 0
SUM OF ALL RESPONSES TO PHQ9 QUESTIONS 1 & 2: 0

## 2024-01-01 ASSESSMENT — PAIN DESCRIPTION - LOCATION
LOCATION: BACK
LOCATION: FOOT
LOCATION: FOOT
LOCATION: BACK
LOCATION: LEG
LOCATION: OTHER (COMMENT)
LOCATION: HEAD
LOCATION: CHEST
LOCATION: BACK

## 2024-01-01 ASSESSMENT — PAIN DESCRIPTION - PAIN TYPE: TYPE: ACUTE PAIN

## 2024-01-01 ASSESSMENT — PAIN DESCRIPTION - PROGRESSION
CLINICAL_PROGRESSION: NOT CHANGED
CLINICAL_PROGRESSION: NOT CHANGED

## 2024-05-06 PROBLEM — J15.8 PNEUMONIA DUE TO OTHER SPECIFIED BACTERIA (MULTI): Status: ACTIVE | Noted: 2024-01-01

## 2024-05-06 NOTE — ED PROVIDER NOTES
HPI   Chief Complaint   Patient presents with   • Fall       This is an 89-year-old male with past medical history of atrial fibrillation on Xarelto, MGUS, hypertension, hyperlipidemia, presented to emergency department for a fall.  Patient states he got up to use the bathroom this evening.  While walking he lost his balance causing him to fall over onto his right side.  He does not believe he hit his head.  He denies LOC.  He is endorsing right-sided hip pain though states that this is present for the last several weeks.  He had a hip replacement years ago and feels like it has been tightening up over this time.  He otherwise denies additional symptoms prior or after the fall including headaches, shortness of breath, chest pain, back pain, abdominal pain, urinary symptoms, lower extremity swelling.        History provided by:  Patient   used: No                        [place] Coma Scale Score: 15                     Patient History   No past medical history on file.  No past surgical history on file.  No family history on file.  Social History     Tobacco Use   • Smoking status: Not on file   • Smokeless tobacco: Not on file   Substance Use Topics   • Alcohol use: Not on file   • Drug use: Not on file       Physical Exam   ED Triage Vitals   Temp Heart Rate Respirations BP   -- 05/06/24 0423 05/06/24 0424 --    89 (!) 25       Pulse Ox Temp src Heart Rate Source Patient Position   05/06/24 0423 -- -- --   (!) 93 %         BP Location FiO2 (%)     -- --             Physical Exam  GEN: no acute distress  HEAD: atraumatic  EYES: EOMI, PEERL  NECK: supple, no C-spine tenderness, no stepoffs or deformities  CVS/CHEST: Irregular rate, irregular rhythm  PULM: CTAB b/l no wheezes, crackles, or rhonchi   GI: NT/ND, no masses or organomegaly, soft, no guarding  BACK: no CVA tenderness, no vertebral point tenderness  EXT: Mild pedal edema bilaterally, 2+ periph pulses in bilat radial and DP, mild  tenderness to palpation overlying lateral right hip without skin changes or obvious deformity.  Range of motion at right hip mildly limited secondary to discomfort  NEURO:  no focal deficits, no facial asymmetry, moving all extremities  PSYCH: AAOx3 answers questions appropriately  ED Course & MDM   Diagnoses as of 05/06/24 0728   Fall, initial encounter   Pneumonia of both lungs due to infectious organism, unspecified part of lung       Medical Decision Making  This is an 89-year-old male with past medical history of atrial fibrillation on Xarelto, MGUS, hypertension, hyperlipidemia, presented to emergency department for a fall.  Patient stable upon presentation to the emergency department, no acute distress and vitals are remarkable only for borderline SpO2 at 93% on room air.  Patient denies any shortness of breath and does not appear to be in any respiratory distress.  On exam he is no obvious signs of acute trauma.  He does have some old ecchymosis to his bilateral forearms but no deformity or tenderness palpation along the upper extremities.  He does have tenderness overlying the lateral right hip though patient is endorsing this is chronic and unchanged.  Is no obvious deformity.  He does have some mild limitations in his range of motion at the hip.  Otherwise his head is atraumatic and he has no spinal tenderness, step-offs, deformities.  With his Eliquis use imaging of the head and neck to be performed.  Will also obtain imaging of the hip/pelvis.  Patient endorsed losing his balance though kind of unclear why.  This may be mechanical secondary to his hip but we will obtain lab work for potential metabolic/infectious process that could be contributing.  Patient declined pain medications.    Patient's lab work showed a mild anemia without a baseline to compare.  Urine with no signs of infection.  COVID testing was negative.  CT of the head and neck with chronic findings but no acute pathology.  Patient's  chest x-ray showed patchy airspace opacities overlying the upper aspect of the lungs bilaterally read as potential pulmonary contusion in the setting of his fall.  Patient attempted to ambulate in the emergency department with increased work of breathing and marked dyspnea.  Upon further discussion patient states he has had a productive cough for the last week.  With this information I suspect patient actually has a pneumonia.  He denies actually falling onto his chest but onto his right side.  Lower suspicion for pulmonary contusions.  Patient treated with antibiotics.  Given his dyspnea I do feel would benefit from admission and further management.  Patient and wife are amenable to this plan.  Patient was discussed with Dr. Cardona who requested patient be admitted under Dr. Roca.   Procedure  Procedures     Vicente Turner MD  05/06/24 0728

## 2024-05-06 NOTE — PROGRESS NOTES
05/06/24 1612   Discharge Planning   Living Arrangements Spouse/significant other   Support Systems Spouse/significant other   Assistance Needed none   Type of Residence Private residence   Number of Stairs to Enter Residence 1   Do you have animals or pets at home? No   Patient expects to be discharged to: pending therapy     5/6/2024  Met with pt and wife in room in the ED, introduced self and explained role.  Verified insurance, address, phone. Per pt ok to speak with wife present.   PCP- Dr chela Lovelace  Pt is from home, lives with wife.  Independent prior to admission.  No use of any assistive devices.   2-3 steps in the home- to the main floor with rail. No difficulty in the steps.  Performs own ADL's.  No bars or seat in bathroom. Pt does the driving.  (Wife does not drive). Stated she walks to/from hospital.   Pt stated he does not have a walker.  Therapies ordered.  Ct Team will follow for any needs.   Luana Rasheed RN [facility]

## 2024-05-06 NOTE — PROGRESS NOTES
Occupational Therapy    Evaluation    Patient Name: Rodo Fam  MRN: [id number]  Today's Date: 5/6/2024  Time Calculation  Start Time: 0858  Stop Time: 0910  Time Calculation (min): 12 min        Assessment:  OT Assessment Results: Decreased ADL status, Decreased upper extremity strength, Decreased endurance, Decreased functional mobility  Plan:  Treatment Interventions: ADL retraining, Functional transfer training, UE strengthening/ROM, Endurance training, Equipment evaluation/education, Patient/family training, Compensatory technique education  OT Frequency: 3 times per week  OT Discharge Recommendations: Low intensity level of continued care  OT - OK to Discharge: Yes (once medically appropriate to next level of care)  Treatment Interventions: ADL retraining, Functional transfer training, UE strengthening/ROM, Endurance training, Equipment evaluation/education, Patient/family training, Compensatory technique education    Subjective   Current Problem:  1. Fall, initial encounter        2. Pneumonia of both lungs due to infectious organism, unspecified part of lung          General:  General  Reason for Referral: OT eval and tx; ADLs. dx; Pneumonia due to other specified bacteria (Multi), Fall at home  Referred By: Chanelle  Past Medical History Relevant to Rehab: Rodo Fam is an 89 year old male who presents to the emergency department from home after a fall at home. He reports that he got up to use the bathroom, lost his balance and fell onto his right side. Upon arrival to the emergency department, his oxygen saturation was 93% on room air.  Patient denies feeling dizzy before the fall.  He denies hitting his head.  He does complain of some pain on his right side, mainly his right hip.  He also complains of back pain and a productive cough for 3 weeks.  He denies any fevers.  He denies any shortness of breath.  No problems moving his bowel or bladder.  He has a past medical history of atrial fibrillation and  he is on Xarelto, monoclonal gammopathy of unknown significance, hypertension, hyperlipidemia, aortic valve stenosis, skin cancer and prostate cancer.  Family/Caregiver Present:  (spouse present, supportive. patient reports it is ok for spouse to be in room for eval)  Co-Treatment: PT  Co-Treatment Reason: for safety and to maximize therapeutic performance  Prior to Session Communication: Bedside nurse  Patient Position Received:  (patient lying in ED cart at start and end of eval, 2 rails up, HOB elevated to comfort, call light in reach, bed alarm engaged, all needs met. IV in place throughout)  General Comment: xray right hip with pelvis: Right hip arthroplasty hardware present without surrounding lucency  or periprosthetic fracture. No acute displaced fracture or  dislocation of the pelvis  Precautions:  Medical Precautions: Fall precautions (OOB with assist, alarms)    Pain:  Pain Assessment  Pain Assessment:  (reports 8/10 pain in back; patient premedicated, declining pillow under knees to assist with pain relief at end of eval)    Objective   Cognition:  Overall Cognitive Status: Within Functional Limits           Home Living:  Type of Home:  (per patient report, lives with spouse, 3 HARPRE[address] with HR. 2nd floor full bathroom and bedroom with HR on stairs. 1/2 bath first floor. basement level with laundry and rec room. spouse will be home with patient and able to assist)  Bathroom Shower/Tub:  (tub shower)  Bathroom Toilet:  (standard toilet)  Prior Function:  Level of [place]:  (independent in ADLs, spouse does IADLs. no AD use PLOF. owns standard walker. patient drives. denies other falls. sleeps in standard bed)    ADL:  ADL Comments: anticipate up to MIN A for ADLs based on transfers and mobility this date       Bed Mobility/Transfers: Bed Mobility  Bed Mobility:  (completed supine <-->sit on ED cart via log roll technique with MIN A overall)    Transfers  Transfer:  (completed STS with SBA without  AD)      Functional Mobility:  Functional Mobility  Functional Mobility Performed:  (engaged in simple mobility with SBA with WW)     Sensation:  Sensation Comment: denies numbness/tingling  Strength:  Strength Comments: BUE ROM/MMT WFL for patient age      Outcome Measures:[facility] Daily Activity  Putting on and taking off regular lower body clothing: A little  Bathing (including washing, rinsing, drying): A little  Putting on and taking off regular upper body clothing: A little  Toileting, which includes using toilet, bedpan or urinal: A little  Taking care of personal grooming such as brushing teeth: A little  Eating Meals: None  Daily Activity - Total Score: 19        Education Documentation  Body Mechanics, taught by Maye Smith OT at 5/6/2024 12:29 PM.  Learner: Patient  Readiness: Acceptance  Method: Explanation  Response: Verbalizes Understanding, Needs Reinforcement    Education Comments  No comments found.        OP EDUCATION:       Goals:  Encounter Problems       Encounter Problems (Active)       OT Goals       STG- patient will complete LB dressing at MOD I with use of ae/ad/dme prn (Progressing)       Start:  05/06/24    Expected End:  05/20/24            STG- patient will complete toileting at MOD I with use of ae/ad/dme prn (Progressing)       Start:  05/06/24    Expected End:  05/20/24            STG- patient will complete transfers to/from bed, chair, commode at MOD I with use of ae/ad/dme prn (Progressing)       Start:  05/06/24    Expected End:  05/20/24            STG- patient will complete simple mobility at MOD I with use of ae/ad/dme prn (Progressing)       Start:  05/06/24    Expected End:  05/20/24            STG- patient will complete grooming tasks at MOD I with use of ae/ad/dme prn (Progressing)       Start:  05/06/24    Expected End:  05/20/24

## 2024-05-06 NOTE — ED NOTES
Walking pulse ox maintained 97% but HR from , needed to hold equipment while walking and became slightly SOB when returning to bed for approx 30 feet total     Tadeo Augustin, GIL  05/06/24 1842

## 2024-05-06 NOTE — H&P
History Of Present Illness  Rodo Fam is a 89 y.o. male  Rodo Fam is an 89 year old male who presents to the emergency department from home after a fall at home. He reports that he got up to use the bathroom, lost his balance and fell onto his right side. Upon arrival to the emergency department, his oxygen saturation was 93% on room air.  Patient denies feeling dizzy before the fall.  He denies hitting his head.  He does complain of some pain on his right side, mainly his right hip.  He also complains of back pain and a productive cough for 3 weeks.  He denies any fevers.  He denies any shortness of breath.  No problems moving his bowel or bladder.  He has a past medical history of atrial fibrillation and he is on Xarelto, monoclonal gammopathy of unknown significance, hypertension, hyperlipidemia, aortic valve stenosis, skin cancer and prostate cancer.    ED Course  VS reviewed  Labs reviewed, results below   EKG unavailable for review  CXR reviewed, results below  CT head and C-spine reviewed, results below  Right hip and right pelvis x-ray reviewed, results below  IV Ceftriaxone  IV Zithromax    Past Medical History  Atrial fibrillation on Xarelto, Monoclonal gammopathy of unknown significance, hypertension, hyperlipidemia, aortic valve stenosis, skin cancer, prostate cancer    Surgical History  Hip replacement, prostatectomy, skin cancer excision     Social History  Nonsmoker, denies alcohol or drug use. Lives at home with his wife and does not use any ambulatory aids    Family History  No family history on file.     Allergies  Penicillins    Review of Systems   Constitutional: Negative.    HENT: Negative.     Eyes: Negative.    Respiratory:  Positive for cough.    Cardiovascular: Negative.    Gastrointestinal: Negative.    Endocrine: Negative.    Genitourinary: Negative.    Musculoskeletal:  Positive for back pain and myalgias.   Skin: Negative.    Allergic/Immunologic: Negative.    Neurological:  "Negative.    Hematological: Negative.    Psychiatric/Behavioral: Negative.          Physical Exam  Constitutional:       General: He is not in acute distress.     Appearance: Normal appearance.   HENT:      Head: Normocephalic.      Nose: Nose normal.      Mouth/Throat:      Mouth: Mucous membranes are moist.      Pharynx: Oropharynx is clear.   Eyes:      Extraocular Movements: Extraocular movements intact.      Pupils: Pupils are equal, round, and reactive to light.   Cardiovascular:      Rate and Rhythm: Normal rate and regular rhythm.      Pulses: Normal pulses.   Pulmonary:      Effort: Pulmonary effort is normal.      Breath sounds: Normal breath sounds.   Abdominal:      General: Bowel sounds are normal.      Palpations: Abdomen is soft.   Musculoskeletal:         General: Tenderness present. Normal range of motion.      Cervical back: Normal range of motion.      Right lower leg: No edema.      Left lower leg: No edema.      Comments: Right hip tenderness   Skin:     General: Skin is warm and dry.      Capillary Refill: Capillary refill takes less than 2 seconds.   Neurological:      General: No focal deficit present.      Mental Status: He is alert and oriented to person, place, and time.   Psychiatric:         Mood and Affect: Mood normal.         Behavior: Behavior normal.          Last Recorded Vitals  Blood pressure 145/64, pulse 82, resp. rate 18, height 1.778 m (5' 10\"), weight 81.6 kg (180 lb), SpO2 94%.    Relevant Results  Results for orders placed or performed during the hospital encounter of 05/06/24 (from the past 24 hour(s))   CBC and Auto Differential   Result Value Ref Range    WBC 10.9 4.4 - 11.3 x10*3/uL    nRBC 0.2 (H) 0.0 - 0.0 /100 WBCs    RBC 3.61 (L) 4.50 - 5.90 x10*6/uL    Hemoglobin 11.4 (L) 13.5 - 17.5 g/dL    Hematocrit 34.7 (L) 41.0 - 52.0 %    MCV 96 80 - 100 fL    MCH 31.6 26.0 - 34.0 pg    MCHC 32.9 32.0 - 36.0 g/dL    RDW 13.5 11.5 - 14.5 %    Platelets 208 150 - 450 x10*3/uL "    Neutrophils % 72.8 40.0 - 80.0 %    Immature Granulocytes %, Automated 4.5 (H) 0.0 - 0.9 %    Lymphocytes % 13.2 13.0 - 44.0 %    Monocytes % 8.6 2.0 - 10.0 %    Eosinophils % 0.4 0.0 - 6.0 %    Basophils % 0.5 0.0 - 2.0 %    Neutrophils Absolute 7.92 (H) 1.60 - 5.50 x10*3/uL    Immature Granulocytes Absolute, Automated 0.49 0.00 - 0.50 x10*3/uL    Lymphocytes Absolute 1.43 0.80 - 3.00 x10*3/uL    Monocytes Absolute 0.94 (H) 0.05 - 0.80 x10*3/uL    Eosinophils Absolute 0.04 0.00 - 0.40 x10*3/uL    Basophils Absolute 0.05 0.00 - 0.10 x10*3/uL   Comprehensive Metabolic Panel   Result Value Ref Range    Glucose 138 (H) 74 - 99 mg/dL    Sodium 140 136 - 145 mmol/L    Potassium 4.0 3.5 - 5.3 mmol/L    Chloride 106 98 - 107 mmol/L    Bicarbonate 18 (L) 21 - 32 mmol/L    Anion Gap 20 10 - 20 mmol/L    Urea Nitrogen 21 6 - 23 mg/dL    Creatinine 0.93 0.50 - 1.30 mg/dL    eGFR 78 >60 mL/min/1.73m*2    Calcium 8.6 8.6 - 10.3 mg/dL    Albumin 3.6 3.4 - 5.0 g/dL    Alkaline Phosphatase 100 33 - 136 U/L    Total Protein 6.8 6.4 - 8.2 g/dL    AST 38 9 - 39 U/L    Bilirubin, Total 0.8 0.0 - 1.2 mg/dL    ALT 16 10 - 52 U/L   Protime-INR   Result Value Ref Range    Protime 20.9 (H) 9.8 - 12.8 seconds    INR 1.8 (H) 0.9 - 1.1   Type And Screen   Result Value Ref Range    ABO TYPE O     Rh TYPE POS     ANTIBODY SCREEN NEG    Sars-CoV-2 PCR   Result Value Ref Range    Coronavirus 2019, PCR Not Detected Not Detected   Urinalysis with Reflex Culture and Microscopic   Result Value Ref Range    Color, Urine Yellow Straw, Yellow    Appearance, Urine Clear Clear    Specific Gravity, Urine 1.024 1.005 - 1.035    pH, Urine 5.0 5.0, 5.5, 6.0, 6.5, 7.0, 7.5, 8.0    Protein, Urine 30 (1+) (N) NEGATIVE mg/dL    Glucose, Urine NEGATIVE NEGATIVE mg/dL    Blood, Urine NEGATIVE NEGATIVE    Ketones, Urine 20 (1+) (A) NEGATIVE mg/dL    Bilirubin, Urine NEGATIVE NEGATIVE    Urobilinogen, Urine 2.0 (N) <2.0 mg/dL    Nitrite, Urine NEGATIVE NEGATIVE     Leukocyte Esterase, Urine NEGATIVE NEGATIVE   Urinalysis Microscopic   Result Value Ref Range    WBC, Urine 1-5 1-5, NONE /HPF    RBC, Urine NONE NONE, 1-2, 3-5 /HPF    Mucus, Urine 1+ Reference range not established. /LPF    Hyaline Casts, Urine OCCASIONAL (A) NONE /LPF   XR hip right with pelvis when performed 2 or 3 views    Result Date: 5/6/2024  Interpreted By:  Finkelstein, Evan, STUDY: XR HIP RIGHT WITH PELVIS WHEN PERFORMED 2 OR 3 VIEWS;  5/6/2024 4:51 am three views right hip. AP view of the pelvis   INDICATION: Signs/Symptoms:fall, right lateral hip pain.   COMPARISON: Pelvic radiograph 04/30/2017   ACCESSION NUMBER(S): [id number]   ORDERING CLINICIAN: ARNAV BURCIAGA   FINDINGS: Right hip arthroplasty hardware is present and appears intact. No surrounding lucency or periprosthetic fracture. Bones are demineralized. There are vascular calcifications. No acute displaced fracture or dislocation of the pelvis. No widening of the pubic symphysis. Degenerative changes in the visualized lower lumbosacral spine.       Right hip arthroplasty hardware present without surrounding lucency or periprosthetic fracture. No acute displaced fracture or dislocation of the pelvis.     MACRO: None.   Signed by: Evan Finkelstein 5/6/2024 6:00 AM Dictation workstation:   [id number]    XR chest 1 view    Result Date: 5/6/2024  Interpreted By:  Finkelstein, Evan, STUDY: XR CHEST 1 VIEW;  5/6/2024 4:31 am   INDICATION: Signs/Symptoms:Trauma.   COMPARISON: Chest radiograph 04/30/2017   ACCESSION NUMBER(S): [id number]   ORDERING CLINICIAN: ARNAV BURCIAGA   FINDINGS:     CARDIOMEDIASTINAL SILHOUETTE: Enlarged cardiac silhouette, similar compared to prior imaging. Calcifications overlie the aortic arch.   LUNGS: Patchy airspace opacities overlying the upper aspect of the lungs bilaterally. No pneumothorax   ABDOMEN: No remarkable upper abdominal findings.   BONES: There are irregularities along the lateral aspect of multiple left-sided  ribs, which appear similar compared to prior imaging.       Patchy airspace opacities overlying the upper aspect of the lungs bilaterally. Findings may represent pulmonary contusions in the setting of trauma. Recommend follow-up to resolution. Irregularities along the lateral aspect of multiple left-sided ribs, which overall appears similar compared to prior imaging.   MACRO: None.   Signed by: Evan Finkelstein 5/6/2024 5:58 AM Dictation workstation:   [id number]    CT head W O contrast trauma protocol    Result Date: 5/6/2024  Interpreted By:  Finkelstein, Evan, STUDY: CT HEAD W/O CONTRAST TRAUMA PROTOCOL; CT CERVICAL SPINE WO IV CONTRAST;  5/6/2024 4:22 am   INDICATION: Signs/Symptoms:fall on thinners.   COMPARISON: CT brain 04/30/2017   ACCESSION NUMBER(S): [id number]; [id number]   ORDERING CLINICIAN: ARNAV BURCIAGA   TECHNIQUE: Noncontrast CT images of the head with coronal and sagittal reconstructions. Axial noncontrast CT images of the cervical spine with coronal and sagittal reconstructed images.   FINDINGS: EXTRACRANIAL SOFT TISSUES: Unremarkable.   CALVARIUM: No depressed skull fracture. No destructive osseous lesion.   PARANASAL SINUSES/MASTOIDS: The visualized paranasal sinuses and mastoid air cells are aerated.   HEMORRHAGE: No acute intracranial hemorrhage.   BRAIN PARENCHYMA: Gray-white matter interfaces are preserved. No mass effect or midline shift. There are nonspecific scattered white matter hypodensities.   VENTRICLES and EXTRA-AXIAL SPACES: Parenchymal atrophy with prominence of the ventricles and cortical sulci.   OTHER FINDINGS: There are calcifications within the cavernous carotids   CERVICAL SPINE:   ALIGNMENT: No traumatic malalignment VERTEBRAE: No acute loss of vertebral body height. Bones are demineralized DISC SPACE: Bony fusion across the C5/C6 disc space. SPINAL CANAL: Multilevel facet and uncovertebral arthropathy with varying degrees of neural foraminal stenosis. No severe central  narrowing. PREVERTEBRAL SOFT TISSUES: No prevertebral soft tissue swelling. LUNG APICES: Imaged portion of the lung apices are within normal limits.   OTHER FINDINGS: None.         No acute intracranial hemorrhage, mass effect or midline shift.   Nonspecific scattered white matter hypodensities favored to represent sequela of small vessel ischemia. Cervical spondylosis without acute loss of vertebral body height or traumatic malalignment.     MACRO: None.   Signed by: Evan Finkelstein 5/6/2024 4:43 AM Dictation workstation:   [id number]    CT cervical spine wo IV contrast    Result Date: 5/6/2024  Interpreted By:  Finkelstein, Evan, STUDY: CT HEAD W/O CONTRAST TRAUMA PROTOCOL; CT CERVICAL SPINE WO IV CONTRAST;  5/6/2024 4:22 am   INDICATION: Signs/Symptoms:fall on thinners.   COMPARISON: CT brain 04/30/2017   ACCESSION NUMBER(S): [id number]; [id number]   ORDERING CLINICIAN: ARNAV BURCIAGA   TECHNIQUE: Noncontrast CT images of the head with coronal and sagittal reconstructions. Axial noncontrast CT images of the cervical spine with coronal and sagittal reconstructed images.   FINDINGS: EXTRACRANIAL SOFT TISSUES: Unremarkable.   CALVARIUM: No depressed skull fracture. No destructive osseous lesion.   PARANASAL SINUSES/MASTOIDS: The visualized paranasal sinuses and mastoid air cells are aerated.   HEMORRHAGE: No acute intracranial hemorrhage.   BRAIN PARENCHYMA: Gray-white matter interfaces are preserved. No mass effect or midline shift. There are nonspecific scattered white matter hypodensities.   VENTRICLES and EXTRA-AXIAL SPACES: Parenchymal atrophy with prominence of the ventricles and cortical sulci.   OTHER FINDINGS: There are calcifications within the cavernous carotids   CERVICAL SPINE:   ALIGNMENT: No traumatic malalignment VERTEBRAE: No acute loss of vertebral body height. Bones are demineralized DISC SPACE: Bony fusion across the C5/C6 disc space. SPINAL CANAL: Multilevel facet and uncovertebral  arthropathy with varying degrees of neural foraminal stenosis. No severe central narrowing. PREVERTEBRAL SOFT TISSUES: No prevertebral soft tissue swelling. LUNG APICES: Imaged portion of the lung apices are within normal limits.   OTHER FINDINGS: None.         No acute intracranial hemorrhage, mass effect or midline shift.   Nonspecific scattered white matter hypodensities favored to represent sequela of small vessel ischemia. Cervical spondylosis without acute loss of vertebral body height or traumatic malalignment.     MACRO: None.   Signed by: Evan Finkelstein 5/6/2024 4:43 AM Dictation workstation:   [id number]          Assessment/Plan   Principal Problem:    Pneumonia due to other specified bacteria (Multi)  Fall at home  Admit to general medicine under Dr. Roca  Acute, observation, telemetry  Continue IV Ceftriaxone and IV Zithromax  Oxygen, titrate to maintain a saturation of 92% or greater  Pain control  CBC and CMP in AM  PT/OT    Chronic conditions  Atrial fibrillation on Xarelto, Monoclonal gammopathy of unknown significance, hypertension, hyperlipidemia, aortic valve stenosis, skin cancer, prostate cancer  Continue home medications as appropriate    DVT Prophylaxis  No chemical prophylaxis, patient takes Xarelto  SCDs           I spent 45 minutes in the professional and overall care of this patient.  Patient fully evaluated and plan as above    Ashley Bonilla, APRN-CNP

## 2024-05-06 NOTE — PROGRESS NOTES
Physical Therapy    Physical Therapy Evaluation    Patient Name: Rodo Fam  MRN: [id number]  Today's Date: 5/6/2024   Time Calculation  Start Time: 0858  Stop Time: 0909  Time Calculation (min): 11 min    Assessment/Plan   PT Assessment  PT Assessment Results: Impaired balance, Decreased mobility, Decreased safety awareness, Pain  Rehab Prognosis: Good  Evaluation/Treatment Tolerance: Patient limited by pain  End of Session Communication: Bedside nurse  Assessment Comment: Continued skilled PT intervention indicated to facilitate increased strength, balance & gait stability  End of Session Patient Position: On cart, Alarm on (bilat rails up, hob elevated to comfort, call light in reach)  IP OR SWING BED PT PLAN  Inpatient or Swing Bed: Inpatient  PT Plan  Treatment/Interventions: Bed mobility, Gait training, Transfer training, Stair training, Balance training, Therapeutic exercise, Therapeutic activity  PT Plan: Skilled PT  PT Frequency: 3 times per week  PT Discharge Recommendations: Low intensity level of continued care (out patient therapy to address back pain)  Equipment Recommended upon Discharge:  (possibly ww pending progress during acute los)  PT - OK to Discharge: Yes (to next level of care when cleared by medical team)    Subjective     Current Problem:  Patient Active Problem List   Diagnosis    Pneumonia due to other specified bacteria (Multi)       General Visit Information:  General  Reason for Referral: PT eval & treat/impaired mobility DX: fall, pneumonia  Referred By: Dr Roca  Past Medical History Relevant to Rehab: 89 y.o. male  Rodo Fam is an 89 year old male who presents to the emergency department from home after a fall at home. He reports that he got up to use the bathroom, lost his balance and fell onto his right side. Upon arrival to the emergency department, his oxygen saturation was 93% on room air.  Patient denies feeling dizzy before the fall.  He denies hitting his head.  He  does complain of some pain on his right side, mainly his right hip.  He also complains of back pain and a productive cough for 3 weeks.  He denies any fevers.  He denies any shortness of breath.  No problems moving his bowel or bladder.  He has a past medical history of atrial fibrillation and he is on Xarelto, monoclonal gammopathy of unknown significance, hypertension, hyperlipidemia, aortic valve stenosis, skin cancer and prostate cancer.All imaging (-)  Family/Caregiver Present:  (spose present, supportive, pt affirms spouse permitted to be in room for PT eval)  Caregiver Feedback: Per conference w/ RN patient stable to participate in therapy  Co-Treatment: OT  Co-Treatment Reason: to maximize pt safety& mobility  Patient Position Received: On cart, Alarm on  General Comment: Pleasant & cooperative, receptive to mobility& instructions. a&ox3, c/o back pain limiting mobility tolerance    Home Living:  Home Living  Home Living Comments: Lives w/ spouse who is home & able to assist; 3steps w/ rail to enter; 1/2 bat 1st fl; bedroom & bathroom 2nd floor w/ rail on stairs; tub shower w/o dme; standard ht toilet w/o rail; basement level rec room, laundry room, 1/2 bathroom; standard bed    Prior Level of Function:  Prior Function Per Pt/Caregiver Report  Prior Function Comments: independent mobility w/o use of device, denies prior h/o falls; independent adl; spouse manages iadl's; pt drives    Precautions:  Precautions  Precautions Comment: fall, alarm, IV  Objective     Pain:  Pain Assessment  Pain Assessment:  (back pain rated 8/10, premedicated, declined pillow under knees for back positioning after session)  General Assessments:    Sensation  Sensation Comment: denies numbness/tingling   Functional Assessments:     Bed Mobility  Bed Mobility:  (instructed in log rolling, min assist supine<>sit)  Transfers  Transfer:  (sba sit>stand ED cart>ww cues for safe hand plcmt)  Ambulation/Gait Training  Ambulation/Gait  Training Performed:  (sba w/ ww ~12 ft total w/ changes in direction, forward mobility limited by lines, cues for upright posture & safe position to ww; pt reported increased back comfort w/ use of ww)     Extremity/Trunk Assessments:        RLE   RLE :  (arom grossly wfl; strength hip & knee grossly at least 3/5 w/ resistance deferrec 2/2 exacerbation of back pain w/ le movement)  LLE   LLE :  (arom grossly wfl; strength hip & knee grossly at least 3/5 w/ resistance deferrec 2/2 exacerbation of back pain w/ le movement)    Outcome Measures:  [facility] Basic Mobility  Turning from your back to your side while in a flat bed without using bedrails: A little  Moving from lying on your back to sitting on the side of a flat bed without using bedrails: A little  Moving to and from bed to chair (including a wheelchair): A little  Standing up from a chair using your arms (e.g. wheelchair or bedside chair): A little  To walk in hospital room: A little  Climbing 3-5 steps with railing: A little  Basic Mobility - Total Score: 18   Goals:  Encounter Problems       Encounter Problems (Active)       PT Problem       STG - Pt will transition supine <> sitting with modified independence using log rolling technique  (Progressing)       Start:  05/06/24    Expected End:  05/20/24            STG - Pt will transfer STS with modified independence   (Progressing)       Start:  05/06/24    Expected End:  05/20/24            STG - Pt will amb >=[id number] appropriate assistive device with modified independence  (Progressing)       Start:  05/06/24    Expected End:  05/20/24            STG -  Pt will navigate >=3 stairs using rail with sba  (Progressing)       Start:  05/06/24    Expected End:  05/20/24                 Education Documentation  Mobility Training, taught by Janae Franco PT at 5/6/2024 11:30 AM.  Learner: Significant Other, Patient  Readiness: Acceptance  Method: Explanation  Response: Verbalizes Understanding, Needs  Reinforcement  Comment: safety, activity progression, use of ww

## 2024-05-06 NOTE — ED NOTES
Report called to Alexsandra CORDERO on 3rd floor, questions answered.      Trish House RN  05/06/24 [id number]
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
## 2024-05-07 NOTE — PROGRESS NOTES
Physical Therapy    Physical Therapy Treatment    Patient Name: Rodo Fam  MRN: [id number]  Today's Date: 5/7/2024  Time Calculation  Start Time: 1121  Stop Time: 1135  Time Calculation (min): 14 min       Assessment/Plan   PT Assessment  End of Session Communication: Bedside nurse  End of Session Patient Position: Up in chair, Alarm on     PT Plan  Treatment/Interventions: Bed mobility, Gait training, Transfer training, Stair training, Balance training, Therapeutic exercise, Therapeutic activity  PT Plan: Skilled PT  PT Frequency: 3 times per week  PT Discharge Recommendations: Low intensity level of continued care (out patient therapy to address back pain)  Equipment Recommended upon Discharge:  (possibly ww pending progress during acute los)  PT - OK to Discharge: Yes (to next level of care when cleared by medical team)      General Visit Information:   PT  Visit  PT Received On: 05/07/24  General  Family/Caregiver Present:  (pt's spouse present)  Prior to Session Communication: Bedside nurse  Patient Position Received: Bed, 2 rail up, Alarm on  General Comment:  (pt pleasant and agreeable to participate in therapy session)    Subjective   Precautions:  Precautions  Medical Precautions: Fall precautions  Precautions Comment:  (IV disconnected by RN at start of tx session per pt/spouse request)       Objective   Pain:  Pain Assessment  Pain Assessment: 0-10  Pain Score: 8  Pain Location: Back  Pain Interventions:  (pt asking for pain meds and for pain patch to be adjusted end of tx session; communicated with RN.)     Treatments:  Bed Mobility  Bed Mobility:  (sup > sit with min A x 1 using bedrail to assist.  attempted to cue pt on logroll technique however pt initiating and moving LEs off side of bed before therapist could direct; min A needed to lift trunk up to sitting position.)    Ambulation/Gait Training  Ambulation/Gait Training Performed:  (pt able to perform brief static stance followed by ambulating  ~12 ft x 2, FWW and SBA.  limited by pain.)    Transfers  Transfer:  (sit <> stand with FWW and CGA.  cuing for safe hand placement and sequencing.)    Outcome Measures:  [facility] Basic Mobility  Turning from your back to your side while in a flat bed without using bedrails: A little  Moving from lying on your back to sitting on the side of a flat bed without using bedrails: A little  Moving to and from bed to chair (including a wheelchair): A little  Standing up from a chair using your arms (e.g. wheelchair or bedside chair): A little  To walk in hospital room: A little  Climbing 3-5 steps with railing: A little  Basic Mobility - Total Score: 18    Education Documentation  Mobility Training, taught by Eloisa Chaudhari PTA at 5/7/2024  3:12 PM.  Learner: Patient  Readiness: Acceptance  Method: Explanation  Response: Verbalizes Understanding    Education Comments  No comments found.        EDUCATION:       Encounter Problems       Encounter Problems (Active)       PT Problem       STG - Pt will transition supine <> sitting with modified independence using log rolling technique  (Progressing)       Start:  05/06/24    Expected End:  05/20/24            STG - Pt will transfer STS with modified independence   (Progressing)       Start:  05/06/24    Expected End:  05/20/24            STG - Pt will amb >=[id number] appropriate assistive device with modified independence  (Progressing)       Start:  05/06/24    Expected End:  05/20/24            STG -  Pt will navigate >=3 stairs using rail with sba  (Progressing)       Start:  05/06/24    Expected End:  05/20/24

## 2024-05-07 NOTE — CARE PLAN
The patient's goals for the shift include      The clinical goals for the shift include Remain HD stable    Over the shift, the patient did not make progress toward the following goals. Barriers to progression include weakness and falls at home. Recommendations to address these barriers include communication and work with therapy, remain safe from falls.

## 2024-05-07 NOTE — PROGRESS NOTES
Rodo Fam is a 89 y.o. male on day 0 of admission presenting with Pneumonia due to other specified bacteria (Multi).      Subjective   Patient fully evaluated on May 7.  Still with significant midthoracic pain.  Continue present IV antibiotics       Objective     Last Recorded Vitals  /70   Pulse 74   Temp 36 °C (96.8 °F)   Resp 16   Wt 81.6 kg (180 lb)   SpO2 92%   Intake/Output last 3 Shifts:    Intake/Output Summary (Last 24 hours) at 5/7/2024 1252  Last data filed at 5/7/2024 0629  Gross per 24 hour   Intake --   Output 500 ml   Net -500 ml       Admission Weight  Weight: 81.6 kg (180 lb) (05/06/24 0800)    Daily Weight  05/06/24 : 81.6 kg (180 lb)    Image Results  XR hip right with pelvis when performed 2 or 3 views  Narrative: Interpreted By:  Finkelstein, Evan,   STUDY:  XR HIP RIGHT WITH PELVIS WHEN PERFORMED 2 OR 3 VIEWS;  5/6/2024 4:51  am three views right hip. AP view of the pelvis      INDICATION:  Signs/Symptoms:fall, right lateral hip pain.      COMPARISON:  Pelvic radiograph 04/30/2017      ACCESSION NUMBER(S):  [id number]      ORDERING CLINICIAN:  ARNAV BURCIAGA      FINDINGS:  Right hip arthroplasty hardware is present and appears intact. No  surrounding lucency or periprosthetic fracture. Bones are  demineralized. There are vascular calcifications. No acute displaced  fracture or dislocation of the pelvis. No widening of the pubic  symphysis. Degenerative changes in the visualized lower lumbosacral  spine.      Impression: Right hip arthroplasty hardware present without surrounding lucency  or periprosthetic fracture. No acute displaced fracture or  dislocation of the pelvis.          MACRO:  None.      Signed by: Evan Finkelstein 5/6/2024 6:00 AM  Dictation workstation:   [id number]  XR chest 1 view  Narrative: Interpreted By:  Finkelstein, Evan,   STUDY:  XR CHEST 1 VIEW;  5/6/2024 4:31 am      INDICATION:  Signs/Symptoms:Trauma.      COMPARISON:  Chest radiograph 04/30/2017       ACCESSION NUMBER(S):  [id number]      ORDERING CLINICIAN:  ARNAV BURCIAGA      FINDINGS:          CARDIOMEDIASTINAL SILHOUETTE:  Enlarged cardiac silhouette, similar compared to prior imaging.  Calcifications overlie the aortic arch.      LUNGS:  Patchy airspace opacities overlying the upper aspect of the lungs  bilaterally. No pneumothorax      ABDOMEN:  No remarkable upper abdominal findings.      BONES:  There are irregularities along the lateral aspect of multiple  left-sided ribs, which appear similar compared to prior imaging.      Impression: Patchy airspace opacities overlying the upper aspect of the lungs  bilaterally. Findings may represent pulmonary contusions in the  setting of trauma. Recommend follow-up to resolution. Irregularities  along the lateral aspect of multiple left-sided ribs, which overall  appears similar compared to prior imaging.      MACRO:  None.      Signed by: Evan Finkelstein 5/6/2024 5:58 AM  Dictation workstation:   [id number]  CT head W O contrast trauma protocol, CT cervical spine wo IV contrast  Narrative: Interpreted By:  Finkelstein, Evan,   STUDY:  CT HEAD W/O CONTRAST TRAUMA PROTOCOL; CT CERVICAL SPINE WO IV  CONTRAST;  5/6/2024 4:22 am      INDICATION:  Signs/Symptoms:fall on thinners.      COMPARISON:  CT brain 04/30/2017      ACCESSION NUMBER(S):  [id number]; [id number]      ORDERING CLINICIAN:  ARNAV BURCIAGA      TECHNIQUE:  Noncontrast CT images of the head with coronal and sagittal  reconstructions. Axial noncontrast CT images of the cervical spine  with coronal and sagittal reconstructed images.      FINDINGS:  EXTRACRANIAL SOFT TISSUES: Unremarkable.      CALVARIUM: No depressed skull fracture. No destructive osseous lesion.      PARANASAL SINUSES/MASTOIDS: The visualized paranasal sinuses and  mastoid air cells are aerated.      HEMORRHAGE: No acute intracranial hemorrhage.      BRAIN PARENCHYMA: Gray-white matter interfaces are preserved. No mass  effect or  midline shift. There are nonspecific scattered white matter  hypodensities.      VENTRICLES and EXTRA-AXIAL SPACES: Parenchymal atrophy with  prominence of the ventricles and cortical sulci.      OTHER FINDINGS: There are calcifications within the cavernous carotids      CERVICAL SPINE:      ALIGNMENT: No traumatic malalignment  VERTEBRAE: No acute loss of vertebral body height. Bones are  demineralized DISC SPACE: Bony fusion across the C5/C6 disc space.  SPINAL CANAL: Multilevel facet and uncovertebral arthropathy with  varying degrees of neural foraminal stenosis. No severe central  narrowing. PREVERTEBRAL SOFT TISSUES: No prevertebral soft tissue  swelling. LUNG APICES: Imaged portion of the lung apices are within  normal limits.      OTHER FINDINGS: None.      Impression:     No acute intracranial hemorrhage, mass effect or midline shift.      Nonspecific scattered white matter hypodensities favored to represent  sequela of small vessel ischemia. Cervical spondylosis without acute  loss of vertebral body height or traumatic malalignment.          MACRO:  None.      Signed by: Evan Finkelstein 5/6/2024 4:43 AM  Dictation workstation:   [id number]      Physical Exam    Relevant Results               Assessment/Plan            Ike Roca MD   Physician  Internal Medicine     H&P      Addendum     Date of Service: 5/6/2024  8:11 AM     Addendum       Expand All Collapse All    History Of Present Illness  Rodo Fam is a 89 y.o. male  Rodo Fam is an 89 year old male who presents to the emergency department from home after a fall at home. He reports that he got up to use the bathroom, lost his balance and fell onto his right side. Upon arrival to the emergency department, his oxygen saturation was 93% on room air.  Patient denies feeling dizzy before the fall.  He denies hitting his head.  He does complain of some pain on his right side, mainly his right hip.  He also complains of back pain and a  productive cough for 3 weeks.  He denies any fevers.  He denies any shortness of breath.  No problems moving his bowel or bladder.  He has a past medical history of atrial fibrillation and he is on Xarelto, monoclonal gammopathy of unknown significance, hypertension, hyperlipidemia, aortic valve stenosis, skin cancer and prostate cancer.     ED Course  VS reviewed  Labs reviewed, results below   EKG unavailable for review  CXR reviewed, results below  CT head and C-spine reviewed, results below  Right hip and right pelvis x-ray reviewed, results below  IV Ceftriaxone  IV Zithromax     Past Medical History  Atrial fibrillation on Xarelto, Monoclonal gammopathy of unknown significance, hypertension, hyperlipidemia, aortic valve stenosis, skin cancer, prostate cancer     Surgical History  Hip replacement, prostatectomy, skin cancer excision     Social History  Nonsmoker, denies alcohol or drug use. Lives at home with his wife and does not use any ambulatory aids     Family History  Family History   No family history on file.        Allergies  Penicillins     Review of Systems   Constitutional: Negative.    HENT: Negative.     Eyes: Negative.    Respiratory:  Positive for cough.    Cardiovascular: Negative.    Gastrointestinal: Negative.    Endocrine: Negative.    Genitourinary: Negative.    Musculoskeletal:  Positive for back pain and myalgias.   Skin: Negative.    Allergic/Immunologic: Negative.    Neurological: Negative.    Hematological: Negative.    Psychiatric/Behavioral: Negative.           Physical Exam  Constitutional:       General: He is not in acute distress.     Appearance: Normal appearance.   HENT:      Head: Normocephalic.      Nose: Nose normal.      Mouth/Throat:      Mouth: Mucous membranes are moist.      Pharynx: Oropharynx is clear.   Eyes:      Extraocular Movements: Extraocular movements intact.      Pupils: Pupils are equal, round, and reactive to light.   Cardiovascular:      Rate and Rhythm:  "Normal rate and regular rhythm.      Pulses: Normal pulses.   Pulmonary:      Effort: Pulmonary effort is normal.      Breath sounds: Normal breath sounds.   Abdominal:      General: Bowel sounds are normal.      Palpations: Abdomen is soft.   Musculoskeletal:         General: Tenderness present. Normal range of motion.      Cervical back: Normal range of motion.      Right lower leg: No edema.      Left lower leg: No edema.      Comments: Right hip tenderness   Skin:     General: Skin is warm and dry.      Capillary Refill: Capillary refill takes less than 2 seconds.   Neurological:      General: No focal deficit present.      Mental Status: He is alert and oriented to person, place, and time.   Psychiatric:         Mood and Affect: Mood normal.         Behavior: Behavior normal.            Last Recorded Vitals  Blood pressure 145/64, pulse 82, resp. rate 18, height 1.778 m (5' 10\"), weight 81.6 kg (180 lb), SpO2 94%.     Relevant Results        Results for orders placed or performed during the hospital encounter of 05/06/24 (from the past 24 hour(s))   CBC and Auto Differential   Result Value Ref Range     WBC 10.9 4.4 - 11.3 x10*3/uL     nRBC 0.2 (H) 0.0 - 0.0 /100 WBCs     RBC 3.61 (L) 4.50 - 5.90 x10*6/uL     Hemoglobin 11.4 (L) 13.5 - 17.5 g/dL     Hematocrit 34.7 (L) 41.0 - 52.0 %     MCV 96 80 - 100 fL     MCH 31.6 26.0 - 34.0 pg     MCHC 32.9 32.0 - 36.0 g/dL     RDW 13.5 11.5 - 14.5 %     Platelets 208 150 - 450 x10*3/uL     Neutrophils % 72.8 40.0 - 80.0 %     Immature Granulocytes %, Automated 4.5 (H) 0.0 - 0.9 %     Lymphocytes % 13.2 13.0 - 44.0 %     Monocytes % 8.6 2.0 - 10.0 %     Eosinophils % 0.4 0.0 - 6.0 %     Basophils % 0.5 0.0 - 2.0 %     Neutrophils Absolute 7.92 (H) 1.60 - 5.50 x10*3/uL     Immature Granulocytes Absolute, Automated 0.49 0.00 - 0.50 x10*3/uL     Lymphocytes Absolute 1.43 0.80 - 3.00 x10*3/uL     Monocytes Absolute 0.94 (H) 0.05 - 0.80 x10*3/uL     Eosinophils Absolute 0.04 " 0.00 - 0.40 x10*3/uL     Basophils Absolute 0.05 0.00 - 0.10 x10*3/uL   Comprehensive Metabolic Panel   Result Value Ref Range     Glucose 138 (H) 74 - 99 mg/dL     Sodium 140 136 - 145 mmol/L     Potassium 4.0 3.5 - 5.3 mmol/L     Chloride 106 98 - 107 mmol/L     Bicarbonate 18 (L) 21 - 32 mmol/L     Anion Gap 20 10 - 20 mmol/L     Urea Nitrogen 21 6 - 23 mg/dL     Creatinine 0.93 0.50 - 1.30 mg/dL     eGFR 78 >60 mL/min/1.73m*2     Calcium 8.6 8.6 - 10.3 mg/dL     Albumin 3.6 3.4 - 5.0 g/dL     Alkaline Phosphatase 100 33 - 136 U/L     Total Protein 6.8 6.4 - 8.2 g/dL     AST 38 9 - 39 U/L     Bilirubin, Total 0.8 0.0 - 1.2 mg/dL     ALT 16 10 - 52 U/L   Protime-INR   Result Value Ref Range     Protime 20.9 (H) 9.8 - 12.8 seconds     INR 1.8 (H) 0.9 - 1.1   Type And Screen   Result Value Ref Range     ABO TYPE O       Rh TYPE POS       ANTIBODY SCREEN NEG     Sars-CoV-2 PCR   Result Value Ref Range     Coronavirus 2019, PCR Not Detected Not Detected   Urinalysis with Reflex Culture and Microscopic   Result Value Ref Range     Color, Urine Yellow Straw, Yellow     Appearance, Urine Clear Clear     Specific Gravity, Urine 1.024 1.005 - 1.035     pH, Urine 5.0 5.0, 5.5, 6.0, 6.5, 7.0, 7.5, 8.0     Protein, Urine 30 (1+) (N) NEGATIVE mg/dL     Glucose, Urine NEGATIVE NEGATIVE mg/dL     Blood, Urine NEGATIVE NEGATIVE     Ketones, Urine 20 (1+) (A) NEGATIVE mg/dL     Bilirubin, Urine NEGATIVE NEGATIVE     Urobilinogen, Urine 2.0 (N) <2.0 mg/dL     Nitrite, Urine NEGATIVE NEGATIVE     Leukocyte Esterase, Urine NEGATIVE NEGATIVE   Urinalysis Microscopic   Result Value Ref Range     WBC, Urine 1-5 1-5, NONE /HPF     RBC, Urine NONE NONE, 1-2, 3-5 /HPF     Mucus, Urine 1+ Reference range not established. /LPF     Hyaline Casts, Urine OCCASIONAL (A) NONE /LPF   XR hip right with pelvis when performed 2 or 3 views     Result Date: 5/6/2024  Interpreted By:  Finkelstein, Evan, STUDY: XR HIP RIGHT WITH PELVIS WHEN PERFORMED 2  OR 3 VIEWS;  5/6/2024 4:51 am three views right hip. AP view of the pelvis   INDICATION: Signs/Symptoms:fall, right lateral hip pain.   COMPARISON: Pelvic radiograph 04/30/2017   ACCESSION NUMBER(S): [id number]   ORDERING CLINICIAN: ARNAV BURCIAGA   FINDINGS: Right hip arthroplasty hardware is present and appears intact. No surrounding lucency or periprosthetic fracture. Bones are demineralized. There are vascular calcifications. No acute displaced fracture or dislocation of the pelvis. No widening of the pubic symphysis. Degenerative changes in the visualized lower lumbosacral spine.        Right hip arthroplasty hardware present without surrounding lucency or periprosthetic fracture. No acute displaced fracture or dislocation of the pelvis.     MACRO: None.   Signed by: Evan Finkelstein 5/6/2024 6:00 AM Dictation workstation:   [id number]     XR chest 1 view     Result Date: 5/6/2024  Interpreted By:  Finkelstein, Evan, STUDY: XR CHEST 1 VIEW;  5/6/2024 4:31 am   INDICATION: Signs/Symptoms:Trauma.   COMPARISON: Chest radiograph 04/30/2017   ACCESSION NUMBER(S): [id number]   ORDERING CLINICIAN: ARNAV BURCIAGA   FINDINGS:     CARDIOMEDIASTINAL SILHOUETTE: Enlarged cardiac silhouette, similar compared to prior imaging. Calcifications overlie the aortic arch.   LUNGS: Patchy airspace opacities overlying the upper aspect of the lungs bilaterally. No pneumothorax   ABDOMEN: No remarkable upper abdominal findings.   BONES: There are irregularities along the lateral aspect of multiple left-sided ribs, which appear similar compared to prior imaging.        Patchy airspace opacities overlying the upper aspect of the lungs bilaterally. Findings may represent pulmonary contusions in the setting of trauma. Recommend follow-up to resolution. Irregularities along the lateral aspect of multiple left-sided ribs, which overall appears similar compared to prior imaging.   MACRO: None.   Signed by: Evan Finkelstein 5/6/2024 5:58 AM  Dictation workstation:   [id number]     CT head W O contrast trauma protocol     Result Date: 5/6/2024  Interpreted By:  Finkelstein, Evan, STUDY: CT HEAD W/O CONTRAST TRAUMA PROTOCOL; CT CERVICAL SPINE WO IV CONTRAST;  5/6/2024 4:22 am   INDICATION: Signs/Symptoms:fall on thinners.   COMPARISON: CT brain 04/30/2017   ACCESSION NUMBER(S): [id number]; [id number]   ORDERING CLINICIAN: ARNAV BURCIAGA   TECHNIQUE: Noncontrast CT images of the head with coronal and sagittal reconstructions. Axial noncontrast CT images of the cervical spine with coronal and sagittal reconstructed images.   FINDINGS: EXTRACRANIAL SOFT TISSUES: Unremarkable.   CALVARIUM: No depressed skull fracture. No destructive osseous lesion.   PARANASAL SINUSES/MASTOIDS: The visualized paranasal sinuses and mastoid air cells are aerated.   HEMORRHAGE: No acute intracranial hemorrhage.   BRAIN PARENCHYMA: Gray-white matter interfaces are preserved. No mass effect or midline shift. There are nonspecific scattered white matter hypodensities.   VENTRICLES and EXTRA-AXIAL SPACES: Parenchymal atrophy with prominence of the ventricles and cortical sulci.   OTHER FINDINGS: There are calcifications within the cavernous carotids   CERVICAL SPINE:   ALIGNMENT: No traumatic malalignment VERTEBRAE: No acute loss of vertebral body height. Bones are demineralized DISC SPACE: Bony fusion across the C5/C6 disc space. SPINAL CANAL: Multilevel facet and uncovertebral arthropathy with varying degrees of neural foraminal stenosis. No severe central narrowing. PREVERTEBRAL SOFT TISSUES: No prevertebral soft tissue swelling. LUNG APICES: Imaged portion of the lung apices are within normal limits.   OTHER FINDINGS: None.          No acute intracranial hemorrhage, mass effect or midline shift.   Nonspecific scattered white matter hypodensities favored to represent sequela of small vessel ischemia. Cervical spondylosis without acute loss of vertebral body height or traumatic  malalignment.     MACRO: None.   Signed by: Evan Finkelstein 5/6/2024 4:43 AM Dictation workstation:   [id number]     CT cervical spine wo IV contrast     Result Date: 5/6/2024  Interpreted By:  Finkelstein, Evan, STUDY: CT HEAD W/O CONTRAST TRAUMA PROTOCOL; CT CERVICAL SPINE WO IV CONTRAST;  5/6/2024 4:22 am   INDICATION: Signs/Symptoms:fall on thinners.   COMPARISON: CT brain 04/30/2017   ACCESSION NUMBER(S): [id number]; [id number]   ORDERING CLINICIAN: ARNAV BURCIAGA   TECHNIQUE: Noncontrast CT images of the head with coronal and sagittal reconstructions. Axial noncontrast CT images of the cervical spine with coronal and sagittal reconstructed images.   FINDINGS: EXTRACRANIAL SOFT TISSUES: Unremarkable.   CALVARIUM: No depressed skull fracture. No destructive osseous lesion.   PARANASAL SINUSES/MASTOIDS: The visualized paranasal sinuses and mastoid air cells are aerated.   HEMORRHAGE: No acute intracranial hemorrhage.   BRAIN PARENCHYMA: Gray-white matter interfaces are preserved. No mass effect or midline shift. There are nonspecific scattered white matter hypodensities.   VENTRICLES and EXTRA-AXIAL SPACES: Parenchymal atrophy with prominence of the ventricles and cortical sulci.   OTHER FINDINGS: There are calcifications within the cavernous carotids   CERVICAL SPINE:   ALIGNMENT: No traumatic malalignment VERTEBRAE: No acute loss of vertebral body height. Bones are demineralized DISC SPACE: Bony fusion across the C5/C6 disc space. SPINAL CANAL: Multilevel facet and uncovertebral arthropathy with varying degrees of neural foraminal stenosis. No severe central narrowing. PREVERTEBRAL SOFT TISSUES: No prevertebral soft tissue swelling. LUNG APICES: Imaged portion of the lung apices are within normal limits.   OTHER FINDINGS: None.          No acute intracranial hemorrhage, mass effect or midline shift.   Nonspecific scattered white matter hypodensities favored to represent sequela of small vessel ischemia.  Cervical spondylosis without acute loss of vertebral body height or traumatic malalignment.     MACRO: None.   Signed by: Evan Finkelstein 5/6/2024 4:43 AM Dictation workstation:   [id number]             Assessment/Plan   Principal Problem:    Pneumonia due to other specified bacteria (Multi)  Fall at home  Admit to general medicine under Dr. Roca  Acute, observation, telemetry  Continue IV Ceftriaxone and IV Zithromax  Oxygen, titrate to maintain a saturation of 92% or greater  Pain control  CBC and CMP in AM  PT/OT     Chronic conditions  Atrial fibrillation on Xarelto, Monoclonal gammopathy of unknown significance, hypertension, hyperlipidemia, aortic valve stenosis, skin cancer, prostate cancer  Continue home medications as appropriate     DVT Prophylaxis  No chemical prophylaxis, patient takes Xarelto  SCDs           I spent 45 minutes in the professional and overall care of this patient.  Patient fully evaluated and plan as above     Ashley Bonilla, LONNIE-CNP                 Revision History       Patient fully evaluated on May 7. Vitals stable and not on supplemental O2. White count normal with hemoglobin 11.3 and hematocrit 33.4. Bicarb is low at 20 with normal kidney function. COVID swab was negative with pending strep pneumonia and legionella swabs. UA showed positive protein, ketones, urobilinogen, and occasional hyaline casts. Urine culture is pending. Per radiologist report, chest XR performed on 5/6/24 showed possible pulmonary contusion with recommendations to have follow up imaging for resolution status. CT of head and right hip XR performed on 5/6/24. Patient currently on azithromycin and ceftriaxone. Will ordered CT thoracic spine. Start Medrol dose pack. Repeat labs in AM. Continue to monitor and support with appropriate analgesia.       Principal Problem:    Pneumonia due to other specified bacteria (Multi)                EWELINA WILHELM

## 2024-05-08 NOTE — PROGRESS NOTES
Rodo Fam is a 89 y.o. male on day 1 of admission presenting with Pneumonia due to other specified bacteria (Multi).      Subjective          Objective     Last Recorded Vitals  /69 (BP Location: Left arm, Patient Position: Sitting)   Pulse 85   Temp 36.5 °C (97.7 °F) (Temporal)   Resp 18   Wt 81.6 kg (180 lb)   SpO2 95%   Intake/Output last 3 Shifts:  No intake or output data in the 24 hours ending 05/08/24 1214      Admission Weight  Weight: 81.6 kg (180 lb) (05/06/24 0800)    Daily Weight  05/06/24 : 81.6 kg (180 lb)    Image Results  XR hip right with pelvis when performed 2 or 3 views  Narrative: Interpreted By:  Finkelstein, Evan,   STUDY:  XR HIP RIGHT WITH PELVIS WHEN PERFORMED 2 OR 3 VIEWS;  5/6/2024 4:51  am three views right hip. AP view of the pelvis      INDICATION:  Signs/Symptoms:fall, right lateral hip pain.      COMPARISON:  Pelvic radiograph 04/30/2017      ACCESSION NUMBER(S):  [id number]      ORDERING CLINICIAN:  ARNAV BURCIAGA      FINDINGS:  Right hip arthroplasty hardware is present and appears intact. No  surrounding lucency or periprosthetic fracture. Bones are  demineralized. There are vascular calcifications. No acute displaced  fracture or dislocation of the pelvis. No widening of the pubic  symphysis. Degenerative changes in the visualized lower lumbosacral  spine.      Impression: Right hip arthroplasty hardware present without surrounding lucency  or periprosthetic fracture. No acute displaced fracture or  dislocation of the pelvis.          MACRO:  None.      Signed by: Evan Finkelstein 5/6/2024 6:00 AM  Dictation workstation:   [id number]  XR chest 1 view  Narrative: Interpreted By:  Finkelstein, Evan,   STUDY:  XR CHEST 1 VIEW;  5/6/2024 4:31 am      INDICATION:  Signs/Symptoms:Trauma.      COMPARISON:  Chest radiograph 04/30/2017      ACCESSION NUMBER(S):  [id number]      ORDERING CLINICIAN:  ARNAV BURCIAGA      FINDINGS:          CARDIOMEDIASTINAL  SILHOUETTE:  Enlarged cardiac silhouette, similar compared to prior imaging.  Calcifications overlie the aortic arch.      LUNGS:  Patchy airspace opacities overlying the upper aspect of the lungs  bilaterally. No pneumothorax      ABDOMEN:  No remarkable upper abdominal findings.      BONES:  There are irregularities along the lateral aspect of multiple  left-sided ribs, which appear similar compared to prior imaging.      Impression: Patchy airspace opacities overlying the upper aspect of the lungs  bilaterally. Findings may represent pulmonary contusions in the  setting of trauma. Recommend follow-up to resolution. Irregularities  along the lateral aspect of multiple left-sided ribs, which overall  appears similar compared to prior imaging.      MACRO:  None.      Signed by: Evan Finkelstein 5/6/2024 5:58 AM  Dictation workstation:   [id number]  CT head W O contrast trauma protocol, CT cervical spine wo IV contrast  Narrative: Interpreted By:  Finkelstein, Evan,   STUDY:  CT HEAD W/O CONTRAST TRAUMA PROTOCOL; CT CERVICAL SPINE WO IV  CONTRAST;  5/6/2024 4:22 am      INDICATION:  Signs/Symptoms:fall on thinners.      COMPARISON:  CT brain 04/30/2017      ACCESSION NUMBER(S):  [id number]; [id number]      ORDERING CLINICIAN:  ARNAV BURCIAGA      TECHNIQUE:  Noncontrast CT images of the head with coronal and sagittal  reconstructions. Axial noncontrast CT images of the cervical spine  with coronal and sagittal reconstructed images.      FINDINGS:  EXTRACRANIAL SOFT TISSUES: Unremarkable.      CALVARIUM: No depressed skull fracture. No destructive osseous lesion.      PARANASAL SINUSES/MASTOIDS: The visualized paranasal sinuses and  mastoid air cells are aerated.      HEMORRHAGE: No acute intracranial hemorrhage.      BRAIN PARENCHYMA: Gray-white matter interfaces are preserved. No mass  effect or midline shift. There are nonspecific scattered white matter  hypodensities.      VENTRICLES and EXTRA-AXIAL SPACES:  Parenchymal atrophy with  prominence of the ventricles and cortical sulci.      OTHER FINDINGS: There are calcifications within the cavernous carotids      CERVICAL SPINE:      ALIGNMENT: No traumatic malalignment  VERTEBRAE: No acute loss of vertebral body height. Bones are  demineralized DISC SPACE: Bony fusion across the C5/C6 disc space.  SPINAL CANAL: Multilevel facet and uncovertebral arthropathy with  varying degrees of neural foraminal stenosis. No severe central  narrowing. PREVERTEBRAL SOFT TISSUES: No prevertebral soft tissue  swelling. LUNG APICES: Imaged portion of the lung apices are within  normal limits.      OTHER FINDINGS: None.      Impression:     No acute intracranial hemorrhage, mass effect or midline shift.      Nonspecific scattered white matter hypodensities favored to represent  sequela of small vessel ischemia. Cervical spondylosis without acute  loss of vertebral body height or traumatic malalignment.          MACRO:  None.      Signed by: Evan Finkelstein 5/6/2024 4:43 AM  Dictation workstation:   [id number]      Physical Exam    Relevant Results               Assessment/Plan            Ike Roca MD   Physician  Internal Medicine     H&P      Addendum     Date of Service: 5/6/2024  8:11 AM     Addendum       Expand All Collapse All    History Of Present Illness  Rodo Fam is a 89 y.o. male  Rodo Fam is an 89 year old male who presents to the emergency department from home after a fall at home. He reports that he got up to use the bathroom, lost his balance and fell onto his right side. Upon arrival to the emergency department, his oxygen saturation was 93% on room air.  Patient denies feeling dizzy before the fall.  He denies hitting his head.  He does complain of some pain on his right side, mainly his right hip.  He also complains of back pain and a productive cough for 3 weeks.  He denies any fevers.  He denies any shortness of breath.  No problems moving his bowel  or bladder.  He has a past medical history of atrial fibrillation and he is on Xarelto, monoclonal gammopathy of unknown significance, hypertension, hyperlipidemia, aortic valve stenosis, skin cancer and prostate cancer.     ED Course  VS reviewed  Labs reviewed, results below   EKG unavailable for review  CXR reviewed, results below  CT head and C-spine reviewed, results below  Right hip and right pelvis x-ray reviewed, results below  IV Ceftriaxone  IV Zithromax     Past Medical History  Atrial fibrillation on Xarelto, Monoclonal gammopathy of unknown significance, hypertension, hyperlipidemia, aortic valve stenosis, skin cancer, prostate cancer     Surgical History  Hip replacement, prostatectomy, skin cancer excision     Social History  Nonsmoker, denies alcohol or drug use. Lives at home with his wife and does not use any ambulatory aids     Family History  Family History   No family history on file.        Allergies  Penicillins     Review of Systems   Constitutional: Negative.    HENT: Negative.     Eyes: Negative.    Respiratory:  Positive for cough.    Cardiovascular: Negative.    Gastrointestinal: Negative.    Endocrine: Negative.    Genitourinary: Negative.    Musculoskeletal:  Positive for back pain and myalgias.   Skin: Negative.    Allergic/Immunologic: Negative.    Neurological: Negative.    Hematological: Negative.    Psychiatric/Behavioral: Negative.           Physical Exam  Constitutional:       General: He is not in acute distress.     Appearance: Normal appearance.   HENT:      Head: Normocephalic.      Nose: Nose normal.      Mouth/Throat:      Mouth: Mucous membranes are moist.      Pharynx: Oropharynx is clear.   Eyes:      Extraocular Movements: Extraocular movements intact.      Pupils: Pupils are equal, round, and reactive to light.   Cardiovascular:      Rate and Rhythm: Normal rate and regular rhythm.      Pulses: Normal pulses.   Pulmonary:      Effort: Pulmonary effort is normal.       "Breath sounds: Normal breath sounds.   Abdominal:      General: Bowel sounds are normal.      Palpations: Abdomen is soft.   Musculoskeletal:         General: Tenderness present. Normal range of motion.      Cervical back: Normal range of motion.      Right lower leg: No edema.      Left lower leg: No edema.      Comments: Right hip tenderness   Skin:     General: Skin is warm and dry.      Capillary Refill: Capillary refill takes less than 2 seconds.   Neurological:      General: No focal deficit present.      Mental Status: He is alert and oriented to person, place, and time.   Psychiatric:         Mood and Affect: Mood normal.         Behavior: Behavior normal.            Last Recorded Vitals  Blood pressure 145/64, pulse 82, resp. rate 18, height 1.778 m (5' 10\"), weight 81.6 kg (180 lb), SpO2 94%.     Relevant Results        Results for orders placed or performed during the hospital encounter of 05/06/24 (from the past 24 hour(s))   CBC and Auto Differential   Result Value Ref Range     WBC 10.9 4.4 - 11.3 x10*3/uL     nRBC 0.2 (H) 0.0 - 0.0 /100 WBCs     RBC 3.61 (L) 4.50 - 5.90 x10*6/uL     Hemoglobin 11.4 (L) 13.5 - 17.5 g/dL     Hematocrit 34.7 (L) 41.0 - 52.0 %     MCV 96 80 - 100 fL     MCH 31.6 26.0 - 34.0 pg     MCHC 32.9 32.0 - 36.0 g/dL     RDW 13.5 11.5 - 14.5 %     Platelets 208 150 - 450 x10*3/uL     Neutrophils % 72.8 40.0 - 80.0 %     Immature Granulocytes %, Automated 4.5 (H) 0.0 - 0.9 %     Lymphocytes % 13.2 13.0 - 44.0 %     Monocytes % 8.6 2.0 - 10.0 %     Eosinophils % 0.4 0.0 - 6.0 %     Basophils % 0.5 0.0 - 2.0 %     Neutrophils Absolute 7.92 (H) 1.60 - 5.50 x10*3/uL     Immature Granulocytes Absolute, Automated 0.49 0.00 - 0.50 x10*3/uL     Lymphocytes Absolute 1.43 0.80 - 3.00 x10*3/uL     Monocytes Absolute 0.94 (H) 0.05 - 0.80 x10*3/uL     Eosinophils Absolute 0.04 0.00 - 0.40 x10*3/uL     Basophils Absolute 0.05 0.00 - 0.10 x10*3/uL   Comprehensive Metabolic Panel   Result Value Ref " Range     Glucose 138 (H) 74 - 99 mg/dL     Sodium 140 136 - 145 mmol/L     Potassium 4.0 3.5 - 5.3 mmol/L     Chloride 106 98 - 107 mmol/L     Bicarbonate 18 (L) 21 - 32 mmol/L     Anion Gap 20 10 - 20 mmol/L     Urea Nitrogen 21 6 - 23 mg/dL     Creatinine 0.93 0.50 - 1.30 mg/dL     eGFR 78 >60 mL/min/1.73m*2     Calcium 8.6 8.6 - 10.3 mg/dL     Albumin 3.6 3.4 - 5.0 g/dL     Alkaline Phosphatase 100 33 - 136 U/L     Total Protein 6.8 6.4 - 8.2 g/dL     AST 38 9 - 39 U/L     Bilirubin, Total 0.8 0.0 - 1.2 mg/dL     ALT 16 10 - 52 U/L   Protime-INR   Result Value Ref Range     Protime 20.9 (H) 9.8 - 12.8 seconds     INR 1.8 (H) 0.9 - 1.1   Type And Screen   Result Value Ref Range     ABO TYPE O       Rh TYPE POS       ANTIBODY SCREEN NEG     Sars-CoV-2 PCR   Result Value Ref Range     Coronavirus 2019, PCR Not Detected Not Detected   Urinalysis with Reflex Culture and Microscopic   Result Value Ref Range     Color, Urine Yellow Straw, Yellow     Appearance, Urine Clear Clear     Specific Gravity, Urine 1.024 1.005 - 1.035     pH, Urine 5.0 5.0, 5.5, 6.0, 6.5, 7.0, 7.5, 8.0     Protein, Urine 30 (1+) (N) NEGATIVE mg/dL     Glucose, Urine NEGATIVE NEGATIVE mg/dL     Blood, Urine NEGATIVE NEGATIVE     Ketones, Urine 20 (1+) (A) NEGATIVE mg/dL     Bilirubin, Urine NEGATIVE NEGATIVE     Urobilinogen, Urine 2.0 (N) <2.0 mg/dL     Nitrite, Urine NEGATIVE NEGATIVE     Leukocyte Esterase, Urine NEGATIVE NEGATIVE   Urinalysis Microscopic   Result Value Ref Range     WBC, Urine 1-5 1-5, NONE /HPF     RBC, Urine NONE NONE, 1-2, 3-5 /HPF     Mucus, Urine 1+ Reference range not established. /LPF     Hyaline Casts, Urine OCCASIONAL (A) NONE /LPF   XR hip right with pelvis when performed 2 or 3 views     Result Date: 5/6/2024  Interpreted By:  Finkelstein, Evan, STUDY: XR HIP RIGHT WITH PELVIS WHEN PERFORMED 2 OR 3 VIEWS;  5/6/2024 4:51 am three views right hip. AP view of the pelvis   INDICATION: Signs/Symptoms:fall, right  lateral hip pain.   COMPARISON: Pelvic radiograph 04/30/2017   ACCESSION NUMBER(S): [id number]   ORDERING CLINICIAN: ARNAV BURCIAGA   FINDINGS: Right hip arthroplasty hardware is present and appears intact. No surrounding lucency or periprosthetic fracture. Bones are demineralized. There are vascular calcifications. No acute displaced fracture or dislocation of the pelvis. No widening of the pubic symphysis. Degenerative changes in the visualized lower lumbosacral spine.        Right hip arthroplasty hardware present without surrounding lucency or periprosthetic fracture. No acute displaced fracture or dislocation of the pelvis.     MACRO: None.   Signed by: Evan Finkelstein 5/6/2024 6:00 AM Dictation workstation:   [id number]     XR chest 1 view     Result Date: 5/6/2024  Interpreted By:  Finkelstein, Evan, STUDY: XR CHEST 1 VIEW;  5/6/2024 4:31 am   INDICATION: Signs/Symptoms:Trauma.   COMPARISON: Chest radiograph 04/30/2017   ACCESSION NUMBER(S): [id number]   ORDERING CLINICIAN: ARNAV BURCIAGA   FINDINGS:     CARDIOMEDIASTINAL SILHOUETTE: Enlarged cardiac silhouette, similar compared to prior imaging. Calcifications overlie the aortic arch.   LUNGS: Patchy airspace opacities overlying the upper aspect of the lungs bilaterally. No pneumothorax   ABDOMEN: No remarkable upper abdominal findings.   BONES: There are irregularities along the lateral aspect of multiple left-sided ribs, which appear similar compared to prior imaging.        Patchy airspace opacities overlying the upper aspect of the lungs bilaterally. Findings may represent pulmonary contusions in the setting of trauma. Recommend follow-up to resolution. Irregularities along the lateral aspect of multiple left-sided ribs, which overall appears similar compared to prior imaging.   MACRO: None.   Signed by: Evan Finkelstein 5/6/2024 5:58 AM Dictation workstation:   [id number]     CT head W O contrast trauma protocol     Result Date: 5/6/2024  Interpreted  By:  Finkelstein, Evan, STUDY: CT HEAD W/O CONTRAST TRAUMA PROTOCOL; CT CERVICAL SPINE WO IV CONTRAST;  5/6/2024 4:22 am   INDICATION: Signs/Symptoms:fall on thinners.   COMPARISON: CT brain 04/30/2017   ACCESSION NUMBER(S): [id number]; [id number]   ORDERING CLINICIAN: ARNAV BURCIAGA   TECHNIQUE: Noncontrast CT images of the head with coronal and sagittal reconstructions. Axial noncontrast CT images of the cervical spine with coronal and sagittal reconstructed images.   FINDINGS: EXTRACRANIAL SOFT TISSUES: Unremarkable.   CALVARIUM: No depressed skull fracture. No destructive osseous lesion.   PARANASAL SINUSES/MASTOIDS: The visualized paranasal sinuses and mastoid air cells are aerated.   HEMORRHAGE: No acute intracranial hemorrhage.   BRAIN PARENCHYMA: Gray-white matter interfaces are preserved. No mass effect or midline shift. There are nonspecific scattered white matter hypodensities.   VENTRICLES and EXTRA-AXIAL SPACES: Parenchymal atrophy with prominence of the ventricles and cortical sulci.   OTHER FINDINGS: There are calcifications within the cavernous carotids   CERVICAL SPINE:   ALIGNMENT: No traumatic malalignment VERTEBRAE: No acute loss of vertebral body height. Bones are demineralized DISC SPACE: Bony fusion across the C5/C6 disc space. SPINAL CANAL: Multilevel facet and uncovertebral arthropathy with varying degrees of neural foraminal stenosis. No severe central narrowing. PREVERTEBRAL SOFT TISSUES: No prevertebral soft tissue swelling. LUNG APICES: Imaged portion of the lung apices are within normal limits.   OTHER FINDINGS: None.          No acute intracranial hemorrhage, mass effect or midline shift.   Nonspecific scattered white matter hypodensities favored to represent sequela of small vessel ischemia. Cervical spondylosis without acute loss of vertebral body height or traumatic malalignment.     MACRO: None.   Signed by: Evan Finkelstein 5/6/2024 4:43 AM Dictation workstation:   [id number]      CT cervical spine wo IV contrast     Result Date: 5/6/2024  Interpreted By:  Finkelstein, Evan, STUDY: CT HEAD W/O CONTRAST TRAUMA PROTOCOL; CT CERVICAL SPINE WO IV CONTRAST;  5/6/2024 4:22 am   INDICATION: Signs/Symptoms:fall on thinners.   COMPARISON: CT brain 04/30/2017   ACCESSION NUMBER(S): [id number]; [id number]   ORDERING CLINICIAN: ARNAV BURCIAGA   TECHNIQUE: Noncontrast CT images of the head with coronal and sagittal reconstructions. Axial noncontrast CT images of the cervical spine with coronal and sagittal reconstructed images.   FINDINGS: EXTRACRANIAL SOFT TISSUES: Unremarkable.   CALVARIUM: No depressed skull fracture. No destructive osseous lesion.   PARANASAL SINUSES/MASTOIDS: The visualized paranasal sinuses and mastoid air cells are aerated.   HEMORRHAGE: No acute intracranial hemorrhage.   BRAIN PARENCHYMA: Gray-white matter interfaces are preserved. No mass effect or midline shift. There are nonspecific scattered white matter hypodensities.   VENTRICLES and EXTRA-AXIAL SPACES: Parenchymal atrophy with prominence of the ventricles and cortical sulci.   OTHER FINDINGS: There are calcifications within the cavernous carotids   CERVICAL SPINE:   ALIGNMENT: No traumatic malalignment VERTEBRAE: No acute loss of vertebral body height. Bones are demineralized DISC SPACE: Bony fusion across the C5/C6 disc space. SPINAL CANAL: Multilevel facet and uncovertebral arthropathy with varying degrees of neural foraminal stenosis. No severe central narrowing. PREVERTEBRAL SOFT TISSUES: No prevertebral soft tissue swelling. LUNG APICES: Imaged portion of the lung apices are within normal limits.   OTHER FINDINGS: None.          No acute intracranial hemorrhage, mass effect or midline shift.   Nonspecific scattered white matter hypodensities favored to represent sequela of small vessel ischemia. Cervical spondylosis without acute loss of vertebral body height or traumatic malalignment.     MACRO: None.   Signed by:  Evan Finkelstein 5/6/2024 4:43 AM Dictation workstation:   [id number]             Assessment/Plan   Principal Problem:    Pneumonia due to other specified bacteria (Multi)  Fall at home  Admit to general medicine under Dr. Roca  Acute, observation, telemetry  Continue IV Ceftriaxone and IV Zithromax  Oxygen, titrate to maintain a saturation of 92% or greater  Pain control  CBC and CMP in AM  PT/OT     Chronic conditions  Atrial fibrillation on Xarelto, Monoclonal gammopathy of unknown significance, hypertension, hyperlipidemia, aortic valve stenosis, skin cancer, prostate cancer  Continue home medications as appropriate     DVT Prophylaxis  No chemical prophylaxis, patient takes Xarelto  SCDs           I spent 45 minutes in the professional and overall care of this patient.  Patient fully evaluated and plan as above     Ashley Bonilla, APRN-CNP                 Revision History       Patient fully evaluated on May 7. Vitals stable and not on supplemental O2. White count normal with hemoglobin 11.3 and hematocrit 33.4. Bicarb is low at 20 with normal kidney function. COVID swab was negative with pending strep pneumonia and legionella swabs. UA showed positive protein, ketones, urobilinogen, and occasional hyaline casts. Urine culture is pending. Per radiologist report, chest XR performed on 5/6/24 showed possible pulmonary contusion with recommendations to have follow up imaging for resolution status. CT of head and right hip XR performed on 5/6/24. Patient currently on azithromycin and ceftriaxone. Will ordered CT thoracic spine due to midthoracic spine pain. Start Medrol dose pack. Continue present IV antibiotics. Repeat labs in AM. Continue to monitor and support with appropriate analgesia.     Patient fully evaluated on May 8. Vitals stable. White count normal with stable H/H, bicarb still low at 19, and AST 42. Patient is negative for strep pneumonia and legionella. CT thoracic spine is pending. PT cleared  patient for discharge and will follow with PT as outpatient. Patient currently on methylprednisolone for management. Will switch him to Levaquin. Repeat labs in AM. Continue to support with appropriate analgesia.     Principal Problem:    Pneumonia due to other specified bacteria (Multi)  Active Problems:    Fall, initial encounter                EWELINA WILHELM

## 2024-05-08 NOTE — CARE PLAN
The patient's goals for the shift include  remaining safe throughout the shift.    The clinical goals for the shift include increase activity    Over the shift, the patient did not make progress toward the following goals. Barriers to progression include acute illness. Recommendations to address these barriers include communication.

## 2024-05-08 NOTE — CARE PLAN
Problem: Pain  Goal: My pain/discomfort is manageable  Outcome: Progressing     Problem: Safety  Goal: Patient will be injury free during hospitalization  Outcome: Progressing  Goal: I will remain free of falls  Outcome: Progressing     Problem: Daily Care  Goal: Daily care needs are met  Outcome: Progressing     Problem: Psychosocial Needs  Goal: Demonstrates ability to cope with hospitalization/illness  Outcome: Progressing  Goal: Collaborate with me, my family, and caregiver to identify my specific goals  Outcome: Progressing   The patient's goals for the shift include  comfort    The clinical goals for the shift include increase activity

## 2024-05-09 NOTE — CARE PLAN
Problem: Skin  Goal: Decreased wound size/increased tissue granulation at next dressing change  Outcome: Progressing  Goal: Participates in plan/prevention/treatment measures  Outcome: Progressing  Goal: Prevent/manage excess moisture  Outcome: Progressing  Goal: Prevent/minimize sheer/friction injuries  Outcome: Progressing  Goal: Promote/optimize nutrition  Outcome: Progressing  Goal: Promote skin healing  Outcome: Progressing     Problem: Pain  Goal: My pain/discomfort is manageable  Outcome: Progressing     Problem: Safety  Goal: Patient will be injury free during hospitalization  Outcome: Progressing  Goal: I will remain free of falls  Outcome: Progressing     Problem: Daily Care  Goal: Daily care needs are met  Outcome: Progressing     Problem: Psychosocial Needs  Goal: Demonstrates ability to cope with hospitalization/illness  Outcome: Progressing  Goal: Collaborate with me, my family, and caregiver to identify my specific goals  Outcome: Progressing     Problem: Discharge Barriers  Goal: My discharge needs are met  Outcome: Progressing   The patient's goals for the shift include  injury free  The clinical goals for the shift include comfort

## 2024-05-09 NOTE — PROGRESS NOTES
Occupational Therapy    OT Treatment    Patient Name: Rodo Fam  MRN: [id number]  Today's Date: 5/9/2024  Time Calculation  Start Time: 1243  Stop Time: 1252  Time Calculation (min): 9 min         Assessment:  End of Session Communication: Bedside nurse  End of Session Patient Position: Up in chair, Alarm on     Plan:  Treatment Interventions: ADL retraining, Functional transfer training, UE strengthening/ROM, Endurance training, Equipment evaluation/education, Patient/family training, Compensatory technique education  OT Frequency: 3 times per week  OT Discharge Recommendations: Low intensity level of continued care  OT - OK to Discharge: Yes (once medically appropriate to next level of care)  Treatment Interventions: ADL retraining, Functional transfer training, UE strengthening/ROM, Endurance training, Equipment evaluation/education, Patient/family training, Compensatory technique education    Subjective   Previous Visit Info:  OT Last Visit  OT Received On: 05/09/24  General:  General  Prior to Session Communication: Bedside nurse  Patient Position Received: Bed, 2 rail up, Alarm on  General Comment: patient requesting to sit in chair for lunch  Precautions:  Precautions Comment: Fall Precautions    Pain:  Pain Assessment  Pain Assessment: 0-10  Pain Score: 0 - No pain    Objective    Cognition:  Cognition  Overall Cognitive Status: Within Functional Limits    Activities of Daily Living: Feeding  Feeding Comments: Independent    Bed Mobility/Transfers: Bed Mobility  Bed Mobility:  (SBA for supine > sit with HOB slightly elevated)    Transfers  Transfer:  (completing sit <> stand from EOB with FWW to complete functional mobility for household distances at supervision level, mildly impaired balance noted but without LOB or safety concerns noted)      Outcome Measures:[facility] Daily Activity  Putting on and taking off regular lower body clothing: A little  Bathing (including washing, rinsing, drying): A  little  Putting on and taking off regular upper body clothing: A little  Toileting, which includes using toilet, bedpan or urinal: A little  Taking care of personal grooming such as brushing teeth: A little  Eating Meals: None  Daily Activity - Total Score: 19        Education Documentation  Precautions, taught by Olivia Aleman OT at 5/9/2024  1:33 PM.  Learner: Patient  Readiness: Acceptance  Method: Explanation  Response: Verbalizes Understanding    ADL Training, taught by Olivia Aleman OT at 5/9/2024  1:33 PM.  Learner: Patient  Readiness: Acceptance  Method: Explanation  Response: Verbalizes Understanding    Body Mechanics, taught by Olivia Aleman OT at 5/9/2024  1:33 PM.  Learner: Patient  Readiness: Acceptance  Method: Explanation  Response: Verbalizes Understanding    Education Comments  No comments found.        OP EDUCATION:  Education  Individual(s) Educated: Patient  Education Provided: Anatomy & Physiology, Diagnosis & Precautions, Joint protection and energy conservation, Ergonomics and postural realignment  Patient Response to Education: Patient/Caregiver Verbalized Understanding of Information    Goals:  Encounter Problems       Encounter Problems (Active)       OT Goals       STG- patient will complete LB dressing at MOD I with use of ae/ad/dme prn (Progressing)       Start:  05/06/24    Expected End:  05/20/24            STG- patient will complete toileting at MOD I with use of ae/ad/dme prn (Progressing)       Start:  05/06/24    Expected End:  05/20/24            STG- patient will complete transfers to/from bed, chair, commode at MOD I with use of ae/ad/dme prn (Progressing)       Start:  05/06/24    Expected End:  05/20/24            STG- patient will complete simple mobility at MOD I with use of ae/ad/dme prn (Progressing)       Start:  05/06/24    Expected End:  05/20/24            STG- patient will complete grooming tasks at MOD I with use of ae/ad/dme prn (Progressing)        Start:  05/06/24    Expected End:  05/20/24

## 2024-05-09 NOTE — PROGRESS NOTES
Rodo Fam is a 89 y.o. male on day 2 of admission presenting with Pneumonia due to other specified bacteria (Multi).      Subjective          Objective     Last Recorded Vitals  /69 (BP Location: Right arm, Patient Position: Lying)   Pulse 80   Temp 36 °C (96.8 °F) (Temporal)   Resp 18   Wt 81.6 kg (180 lb)   SpO2 93%   Intake/Output last 3 Shifts:    Intake/Output Summary (Last 24 hours) at 5/9/2024 1331  Last data filed at 5/9/2024 0847  Gross per 24 hour   Intake --   Output 500 ml   Net -500 ml         Admission Weight  Weight: 81.6 kg (180 lb) (05/06/24 0800)    Daily Weight  05/06/24 : 81.6 kg (180 lb)    Image Results  CT thoracic spine wo IV contrast  Narrative: Interpreted By:  Jc Tran,   STUDY:  CT THORACIC SPINE WO IV CONTRAST;  5/8/2024 3:55 pm      INDICATION:  Signs/Symptoms:pain.      COMPARISON:  None.      ACCESSION NUMBER(S):  [id number]      ORDERING CLINICIAN:  ISAK KAMARA      TECHNIQUE:  Thin cut axial CT images through the thoracic spine were obtained and  reconstructed in the coronal and sagittal planes.      FINDINGS:  Counting from below, the caudal most ribs are noted to be small and  for the sake of numbering purposes will be labeled as the 12th ribs.      There is a lytic expansile osseous lesion involving the  posterior  left 11th rib with surrounding intermediate attenuation soft tissue  fullness most concerning for underlying neoplasm/metastatic disease.      There are small vague lucencies within the inferior T7 and superior  T8 vertebrae as seen on sagittal reconstructed slice 54 of 101 which  could be benign or malignant in etiology.      There is exaggeration of the kyphotic curvature of the thoracic  spine. There is 2 mm of anterolisthesis of T1 on T2.      No acute fracture is noted.      There is bony ankylosis of mid and lower thoracic vertebrae.      There is spondylosis noted throughout the thoracic as well as the  partially imaged upper lumbar and  lower cervical regions.      There are bilateral pleural effusions. There are scattered somewhat  ill-defined areas of opacity/airspace disease within the lungs  bilaterally. Correlation with a dedicated CT of the chest would be  recommended.          Impression: Counting from below, the caudal most ribs are noted to be small and  for the sake of numbering purposes will be labeled as the 12th ribs.      There is a lytic expansile osseous lesion involving the  posterior  left 11th rib with surrounding intermediate attenuation soft tissue  fullness most concerning for underlying neoplasm/metastatic disease.      There are small vague lucencies within the inferior T7 and superior  T8 vertebrae as seen on sagittal reconstructed slice 54 of 101 which  could be benign or malignant in etiology.      There is exaggeration of the kyphotic curvature of the thoracic  spine. There is 2 mm of anterolisthesis of T1 on T2.      No acute fracture is noted.      There is bony ankylosis of mid and lower thoracic vertebrae.      There is spondylosis noted throughout the thoracic as well as the  partially imaged upper lumbar and lower cervical regions.      There are bilateral pleural effusions. There are scattered somewhat  ill-defined areas of opacity/airspace disease within the lungs  bilaterally. Correlation with a dedicated CT of the chest would be  recommended.      MACRO:  None      Signed by: Jc Tran 5/9/2024 5:53 AM  Dictation workstation:   [id number]      Physical Exam    Relevant Results               Assessment/Plan            Ike Roca MD   Physician  Internal Medicine     H&P      Addendum     Date of Service: 5/6/2024  8:11 AM     Addendum       Expand All Collapse All    History Of Present Illness  Rodo Fam is a 89 y.o. male  Rodo Fam is an 89 year old male who presents to the emergency department from home after a fall at home. He reports that he got up to use the bathroom, lost his balance  and fell onto his right side. Upon arrival to the emergency department, his oxygen saturation was 93% on room air.  Patient denies feeling dizzy before the fall.  He denies hitting his head.  He does complain of some pain on his right side, mainly his right hip.  He also complains of back pain and a productive cough for 3 weeks.  He denies any fevers.  He denies any shortness of breath.  No problems moving his bowel or bladder.  He has a past medical history of atrial fibrillation and he is on Xarelto, monoclonal gammopathy of unknown significance, hypertension, hyperlipidemia, aortic valve stenosis, skin cancer and prostate cancer.     ED Course  VS reviewed  Labs reviewed, results below   EKG unavailable for review  CXR reviewed, results below  CT head and C-spine reviewed, results below  Right hip and right pelvis x-ray reviewed, results below  IV Ceftriaxone  IV Zithromax     Past Medical History  Atrial fibrillation on Xarelto, Monoclonal gammopathy of unknown significance, hypertension, hyperlipidemia, aortic valve stenosis, skin cancer, prostate cancer     Surgical History  Hip replacement, prostatectomy, skin cancer excision     Social History  Nonsmoker, denies alcohol or drug use. Lives at home with his wife and does not use any ambulatory aids     Family History  Family History   No family history on file.        Allergies  Penicillins     Review of Systems   Constitutional: Negative.    HENT: Negative.     Eyes: Negative.    Respiratory:  Positive for cough.    Cardiovascular: Negative.    Gastrointestinal: Negative.    Endocrine: Negative.    Genitourinary: Negative.    Musculoskeletal:  Positive for back pain and myalgias.   Skin: Negative.    Allergic/Immunologic: Negative.    Neurological: Negative.    Hematological: Negative.    Psychiatric/Behavioral: Negative.           Physical Exam  Constitutional:       General: He is not in acute distress.     Appearance: Normal appearance.   HENT:      Head:  "Normocephalic.      Nose: Nose normal.      Mouth/Throat:      Mouth: Mucous membranes are moist.      Pharynx: Oropharynx is clear.   Eyes:      Extraocular Movements: Extraocular movements intact.      Pupils: Pupils are equal, round, and reactive to light.   Cardiovascular:      Rate and Rhythm: Normal rate and regular rhythm.      Pulses: Normal pulses.   Pulmonary:      Effort: Pulmonary effort is normal.      Breath sounds: Normal breath sounds.   Abdominal:      General: Bowel sounds are normal.      Palpations: Abdomen is soft.   Musculoskeletal:         General: Tenderness present. Normal range of motion.      Cervical back: Normal range of motion.      Right lower leg: No edema.      Left lower leg: No edema.      Comments: Right hip tenderness   Skin:     General: Skin is warm and dry.      Capillary Refill: Capillary refill takes less than 2 seconds.   Neurological:      General: No focal deficit present.      Mental Status: He is alert and oriented to person, place, and time.   Psychiatric:         Mood and Affect: Mood normal.         Behavior: Behavior normal.            Last Recorded Vitals  Blood pressure 145/64, pulse 82, resp. rate 18, height 1.778 m (5' 10\"), weight 81.6 kg (180 lb), SpO2 94%.     Relevant Results        Results for orders placed or performed during the hospital encounter of 05/06/24 (from the past 24 hour(s))   CBC and Auto Differential   Result Value Ref Range     WBC 10.9 4.4 - 11.3 x10*3/uL     nRBC 0.2 (H) 0.0 - 0.0 /100 WBCs     RBC 3.61 (L) 4.50 - 5.90 x10*6/uL     Hemoglobin 11.4 (L) 13.5 - 17.5 g/dL     Hematocrit 34.7 (L) 41.0 - 52.0 %     MCV 96 80 - 100 fL     MCH 31.6 26.0 - 34.0 pg     MCHC 32.9 32.0 - 36.0 g/dL     RDW 13.5 11.5 - 14.5 %     Platelets 208 150 - 450 x10*3/uL     Neutrophils % 72.8 40.0 - 80.0 %     Immature Granulocytes %, Automated 4.5 (H) 0.0 - 0.9 %     Lymphocytes % 13.2 13.0 - 44.0 %     Monocytes % 8.6 2.0 - 10.0 %     Eosinophils % 0.4 0.0 - " 6.0 %     Basophils % 0.5 0.0 - 2.0 %     Neutrophils Absolute 7.92 (H) 1.60 - 5.50 x10*3/uL     Immature Granulocytes Absolute, Automated 0.49 0.00 - 0.50 x10*3/uL     Lymphocytes Absolute 1.43 0.80 - 3.00 x10*3/uL     Monocytes Absolute 0.94 (H) 0.05 - 0.80 x10*3/uL     Eosinophils Absolute 0.04 0.00 - 0.40 x10*3/uL     Basophils Absolute 0.05 0.00 - 0.10 x10*3/uL   Comprehensive Metabolic Panel   Result Value Ref Range     Glucose 138 (H) 74 - 99 mg/dL     Sodium 140 136 - 145 mmol/L     Potassium 4.0 3.5 - 5.3 mmol/L     Chloride 106 98 - 107 mmol/L     Bicarbonate 18 (L) 21 - 32 mmol/L     Anion Gap 20 10 - 20 mmol/L     Urea Nitrogen 21 6 - 23 mg/dL     Creatinine 0.93 0.50 - 1.30 mg/dL     eGFR 78 >60 mL/min/1.73m*2     Calcium 8.6 8.6 - 10.3 mg/dL     Albumin 3.6 3.4 - 5.0 g/dL     Alkaline Phosphatase 100 33 - 136 U/L     Total Protein 6.8 6.4 - 8.2 g/dL     AST 38 9 - 39 U/L     Bilirubin, Total 0.8 0.0 - 1.2 mg/dL     ALT 16 10 - 52 U/L   Protime-INR   Result Value Ref Range     Protime 20.9 (H) 9.8 - 12.8 seconds     INR 1.8 (H) 0.9 - 1.1   Type And Screen   Result Value Ref Range     ABO TYPE O       Rh TYPE POS       ANTIBODY SCREEN NEG     Sars-CoV-2 PCR   Result Value Ref Range     Coronavirus 2019, PCR Not Detected Not Detected   Urinalysis with Reflex Culture and Microscopic   Result Value Ref Range     Color, Urine Yellow Straw, Yellow     Appearance, Urine Clear Clear     Specific Gravity, Urine 1.024 1.005 - 1.035     pH, Urine 5.0 5.0, 5.5, 6.0, 6.5, 7.0, 7.5, 8.0     Protein, Urine 30 (1+) (N) NEGATIVE mg/dL     Glucose, Urine NEGATIVE NEGATIVE mg/dL     Blood, Urine NEGATIVE NEGATIVE     Ketones, Urine 20 (1+) (A) NEGATIVE mg/dL     Bilirubin, Urine NEGATIVE NEGATIVE     Urobilinogen, Urine 2.0 (N) <2.0 mg/dL     Nitrite, Urine NEGATIVE NEGATIVE     Leukocyte Esterase, Urine NEGATIVE NEGATIVE   Urinalysis Microscopic   Result Value Ref Range     WBC, Urine 1-5 1-5, NONE /HPF     RBC, Urine  NONE NONE, 1-2, 3-5 /HPF     Mucus, Urine 1+ Reference range not established. /LPF     Hyaline Casts, Urine OCCASIONAL (A) NONE /LPF   XR hip right with pelvis when performed 2 or 3 views     Result Date: 5/6/2024  Interpreted By:  Finkelstein, Evan, STUDY: XR HIP RIGHT WITH PELVIS WHEN PERFORMED 2 OR 3 VIEWS;  5/6/2024 4:51 am three views right hip. AP view of the pelvis   INDICATION: Signs/Symptoms:fall, right lateral hip pain.   COMPARISON: Pelvic radiograph 04/30/2017   ACCESSION NUMBER(S): [id number]   ORDERING CLINICIAN: ARNAV BURCIAGA   FINDINGS: Right hip arthroplasty hardware is present and appears intact. No surrounding lucency or periprosthetic fracture. Bones are demineralized. There are vascular calcifications. No acute displaced fracture or dislocation of the pelvis. No widening of the pubic symphysis. Degenerative changes in the visualized lower lumbosacral spine.        Right hip arthroplasty hardware present without surrounding lucency or periprosthetic fracture. No acute displaced fracture or dislocation of the pelvis.     MACRO: None.   Signed by: Evan Finkelstein 5/6/2024 6:00 AM Dictation workstation:   [id number]     XR chest 1 view     Result Date: 5/6/2024  Interpreted By:  Finkelstein, Evan, STUDY: XR CHEST 1 VIEW;  5/6/2024 4:31 am   INDICATION: Signs/Symptoms:Trauma.   COMPARISON: Chest radiograph 04/30/2017   ACCESSION NUMBER(S): [id number]   ORDERING CLINICIAN: ARNAV BURCIAGA   FINDINGS:     CARDIOMEDIASTINAL SILHOUETTE: Enlarged cardiac silhouette, similar compared to prior imaging. Calcifications overlie the aortic arch.   LUNGS: Patchy airspace opacities overlying the upper aspect of the lungs bilaterally. No pneumothorax   ABDOMEN: No remarkable upper abdominal findings.   BONES: There are irregularities along the lateral aspect of multiple left-sided ribs, which appear similar compared to prior imaging.        Patchy airspace opacities overlying the upper aspect of the lungs  bilaterally. Findings may represent pulmonary contusions in the setting of trauma. Recommend follow-up to resolution. Irregularities along the lateral aspect of multiple left-sided ribs, which overall appears similar compared to prior imaging.   MACRO: None.   Signed by: Evan Finkelstein 5/6/2024 5:58 AM Dictation workstation:   [id number]     CT head W O contrast trauma protocol     Result Date: 5/6/2024  Interpreted By:  Finkelstein, Evan, STUDY: CT HEAD W/O CONTRAST TRAUMA PROTOCOL; CT CERVICAL SPINE WO IV CONTRAST;  5/6/2024 4:22 am   INDICATION: Signs/Symptoms:fall on thinners.   COMPARISON: CT brain 04/30/2017   ACCESSION NUMBER(S): [id number]; [id number]   ORDERING CLINICIAN: ARNAV BURCIAGA   TECHNIQUE: Noncontrast CT images of the head with coronal and sagittal reconstructions. Axial noncontrast CT images of the cervical spine with coronal and sagittal reconstructed images.   FINDINGS: EXTRACRANIAL SOFT TISSUES: Unremarkable.   CALVARIUM: No depressed skull fracture. No destructive osseous lesion.   PARANASAL SINUSES/MASTOIDS: The visualized paranasal sinuses and mastoid air cells are aerated.   HEMORRHAGE: No acute intracranial hemorrhage.   BRAIN PARENCHYMA: Gray-white matter interfaces are preserved. No mass effect or midline shift. There are nonspecific scattered white matter hypodensities.   VENTRICLES and EXTRA-AXIAL SPACES: Parenchymal atrophy with prominence of the ventricles and cortical sulci.   OTHER FINDINGS: There are calcifications within the cavernous carotids   CERVICAL SPINE:   ALIGNMENT: No traumatic malalignment VERTEBRAE: No acute loss of vertebral body height. Bones are demineralized DISC SPACE: Bony fusion across the C5/C6 disc space. SPINAL CANAL: Multilevel facet and uncovertebral arthropathy with varying degrees of neural foraminal stenosis. No severe central narrowing. PREVERTEBRAL SOFT TISSUES: No prevertebral soft tissue swelling. LUNG APICES: Imaged portion of the lung apices  are within normal limits.   OTHER FINDINGS: None.          No acute intracranial hemorrhage, mass effect or midline shift.   Nonspecific scattered white matter hypodensities favored to represent sequela of small vessel ischemia. Cervical spondylosis without acute loss of vertebral body height or traumatic malalignment.     MACRO: None.   Signed by: Evan Finkelstein 5/6/2024 4:43 AM Dictation workstation:   [id number]     CT cervical spine wo IV contrast     Result Date: 5/6/2024  Interpreted By:  Finkelstein, Evan, STUDY: CT HEAD W/O CONTRAST TRAUMA PROTOCOL; CT CERVICAL SPINE WO IV CONTRAST;  5/6/2024 4:22 am   INDICATION: Signs/Symptoms:fall on thinners.   COMPARISON: CT brain 04/30/2017   ACCESSION NUMBER(S): [id number]; [id number]   ORDERING CLINICIAN: ARNAV BURCIAGA   TECHNIQUE: Noncontrast CT images of the head with coronal and sagittal reconstructions. Axial noncontrast CT images of the cervical spine with coronal and sagittal reconstructed images.   FINDINGS: EXTRACRANIAL SOFT TISSUES: Unremarkable.   CALVARIUM: No depressed skull fracture. No destructive osseous lesion.   PARANASAL SINUSES/MASTOIDS: The visualized paranasal sinuses and mastoid air cells are aerated.   HEMORRHAGE: No acute intracranial hemorrhage.   BRAIN PARENCHYMA: Gray-white matter interfaces are preserved. No mass effect or midline shift. There are nonspecific scattered white matter hypodensities.   VENTRICLES and EXTRA-AXIAL SPACES: Parenchymal atrophy with prominence of the ventricles and cortical sulci.   OTHER FINDINGS: There are calcifications within the cavernous carotids   CERVICAL SPINE:   ALIGNMENT: No traumatic malalignment VERTEBRAE: No acute loss of vertebral body height. Bones are demineralized DISC SPACE: Bony fusion across the C5/C6 disc space. SPINAL CANAL: Multilevel facet and uncovertebral arthropathy with varying degrees of neural foraminal stenosis. No severe central narrowing. PREVERTEBRAL SOFT TISSUES: No  prevertebral soft tissue swelling. LUNG APICES: Imaged portion of the lung apices are within normal limits.   OTHER FINDINGS: None.          No acute intracranial hemorrhage, mass effect or midline shift.   Nonspecific scattered white matter hypodensities favored to represent sequela of small vessel ischemia. Cervical spondylosis without acute loss of vertebral body height or traumatic malalignment.     MACRO: None.   Signed by: Evan Finkelstein 5/6/2024 4:43 AM Dictation workstation:   [id number]             Assessment/Plan   Principal Problem:    Pneumonia due to other specified bacteria (Multi)  Fall at home  Admit to general medicine under Dr. Roca  Acute, observation, telemetry  Continue IV Ceftriaxone and IV Zithromax  Oxygen, titrate to maintain a saturation of 92% or greater  Pain control  CBC and CMP in AM  PT/OT     Chronic conditions  Atrial fibrillation on Xarelto, Monoclonal gammopathy of unknown significance, hypertension, hyperlipidemia, aortic valve stenosis, skin cancer, prostate cancer  Continue home medications as appropriate     DVT Prophylaxis  No chemical prophylaxis, patient takes Xarelto  SCDs           I spent 45 minutes in the professional and overall care of this patient.  Patient fully evaluated and plan as above     Ashley Bonilla, APRN-CNP                 Revision History       Patient fully evaluated on May 7. Vitals stable and not on supplemental O2. White count normal with hemoglobin 11.3 and hematocrit 33.4. Bicarb is low at 20 with normal kidney function. COVID swab was negative with pending strep pneumonia and legionella swabs. UA showed positive protein, ketones, urobilinogen, and occasional hyaline casts. Urine culture is pending. Per radiologist report, chest XR performed on 5/6/24 showed possible pulmonary contusion with recommendations to have follow up imaging for resolution status. CT of head and right hip XR performed on 5/6/24. Patient currently on azithromycin and  ceftriaxone. Will ordered CT thoracic spine due to midthoracic spine pain. Start Medrol dose pack. Continue present IV antibiotics. Repeat labs in AM. Continue to monitor and support with appropriate analgesia.     Patient fully evaluated on May 8. Vitals stable. White count normal with stable H/H, bicarb still low at 19, and AST 42. Patient is negative for strep pneumonia and legionella. CT thoracic spine is pending. PT cleared patient for discharge and will follow with PT as outpatient. Patient currently on methylprednisolone for management. Will switch him to Levaquin. Repeat labs in AM. Continue to support with appropriate analgesia.     Patient fully evaluated on May 9. Vitals stable. Labs show white count 12.6 with stable H/H, BUN 30, and normal Cr and GFR. Per radiologist report, CT of thoracic spine performed 5/8/24 showed possible malignancy of 11th rib, no fracture, and bilateral pleural effusion with recommendations for chest CT. Patient with a significant history of prostate and skin cancer, will order chest CT and CT of abdomen/pelvis. He is currently on levofloxacin and methylprednisolone. Repeat labs in the AM. Continue to monitor and support with appropriate analgesia.     Principal Problem:    Pneumonia due to other specified bacteria (Multi)  Active Problems:    Fall, initial encounter                EWELINA WILHELM

## 2024-05-10 NOTE — PROGRESS NOTES
TCC note: MD informed me that pt and spouse are interested in pt going to a SNF for Rehab. Therapy did re-eval pt today. Provided SNF list per request and informed pt/spouse that I will need 3 choices and to please let me know the order of preference. Told them I would return for choices. Aruna Rasmussen, MSN, RN, TCC.    ADDENDUM: Pt interested in SNF. Choices were [facility] in that order. [facility] accepted. Requested out precert team to please start insurance Auth. Aruna Rasmussen, MSN, RN, TCC.

## 2024-05-10 NOTE — PROGRESS NOTES
Rodo Fam is a 89 y.o. male on day 3 of admission presenting with Pneumonia due to other specified bacteria (Multi).      Subjective          Objective     Last Recorded Vitals  /75 (BP Location: Right arm, Patient Position: Sitting)   Pulse 83   Temp 36.5 °C (97.7 °F) (Temporal)   Resp 18   Wt 81.6 kg (180 lb)   SpO2 93%   Intake/Output last 3 Shifts:    Intake/Output Summary (Last 24 hours) at 5/10/2024 1535  Last data filed at 5/9/2024 1658  Gross per 24 hour   Intake --   Output 200 ml   Net -200 ml         Admission Weight  Weight: 81.6 kg (180 lb) (05/06/24 0800)    Daily Weight  05/06/24 : 81.6 kg (180 lb)    Image Results  CT chest abdomen pelvis w IV contrast  Narrative: Interpreted By:  Robert Alarcon,   STUDY:  CT CHEST ABDOMEN PELVIS W IV CONTRAST;  5/9/2024 6:23 pm      INDICATION:  Signs/Symptoms:pain      COMPARISON:  None.      ACCESSION NUMBER(S):  [id number]      ORDERING CLINICIAN:  ISAK KAMARA      TECHNIQUE:  CT of the chest, abdomen, and pelvis was performed.  Sequential transaxial images were obtained with orthogonal  reconstructions. 75mL of  Omnipaque 350 was injected intravenously.      FINDINGS:  CHEST:      LUNG/PLEURA/LARGE AIRWAYS:  There are small to moderate bilateral pleural effusions with mild  posterior basilar compressive atelectasis. Additionally there are  ground-glass and partially consolidating infiltrates at the upper  lobes with mild bronchiectasis, with additional small area of  ground-glass attenuation at the superior segment of the left lower  lobe, most likely from multifocal pneumonia.          VESSELS:  The thoracic aorta is of normal course and caliber , but with mild  atherosclerotic calcification .      Main pulmonary artery and its branches are normal in caliber.      There is moderate coronary artery atherosclerotic calcification  primarily involving the LAD.      HEART:  The heart is normal in size.  There is no pericardial effusion.       MEDIASTINUM AND VALE:  There is a borderline AP window node measuring 1 cm in short axis,  probably reactive. However, early malignant node is a consideration  given the left 11th rib lesion described below.      The thyroid gland as visualized appears unremarkable.      CHEST WALL AND LOWER NECK:  There is an expansile and lytic lesion of the left 11th rib  posteriorly, measuring 4.7 x 3 cm. This would be most consistent with  malignancy, probably metastatic but primary lesion such as  plasmacytoma etc. Is possible. There is hypodensity measuring  approximately 8 mm at the T7 and T8 endplates, probably benign  notochordal remnants. However, an early lytic lesion given the left  rib is also a consideration.      There is mild subcutaneous edema of the lower lateral chest walls,  greater on the right.      ABDOMEN:      LIVER:  The hepatic parenchyma is homogeneous without evidence of focal liver  lesions.The hepatic size is normal.      SPLEEN:  The spleen is normal in size and homogeneous.      ADRENAL GLANDS:  Bilateral adrenal glands appear normal.      KIDNEYS AND URETERS:  The renal cortices are unremarkable and the renal sizes within normal  limits , with cortical cyst(s).      The distal right ureter at the pelvis is obscured due to beam  hardening artifact from a right hip replacement. Otherwise no  identified urinary tract dilatation or calculi.      PANCREAS:  The pancreas appears unremarkable, there is no ductal dilatation or  masses.      GALLBLADDER:  No radiodense calculi, wall thickening or pericholecystic fluid.      BILE DUCTS:  There is no biliary dilatation or filling defects.          VESSELS:  There is moderate to extensive atherosclerotic calcification of the  aorta and iliac vessels, and proximal 1 cm segment of the right renal  artery. There is mild atherosclerotic calcification of the proximal  left renal artery. There is also moderate atherosclerotic plaque of  the superior mesenteric  artery proximally.      PERITONEUM AND RETROPERITONEUM:  No ascites considering obscured right lower pelvis due to the right  hip arthroplasty. No free intraperitoneal air.      The retroperitoneum appears unremarkable, and without significant  adenopathy.      No enlarged mesenteric lymph nodes.      BOWEL:  Moderate diverticulosis greatest at the distal descending and  proximal sigmoid colon. Bowel at the lower pelvis is partially  obscured again due to beam hardening artifact from a right hip  arthroplasty. There is mild gastric distention with intraluminal  content probably from recent ingestion. The bowel including the  appendix otherwise is unremarkable without significant dilatation or  mural thickening.      PELVIS:      BLADDER:  Partially obscured, otherwise grossly unremarkable.      REPRODUCTIVE ORGANS:  The pelvis, particularly right laterally is obscured by beam  hardening, otherwise grossly unremarkable.          BONE, ABDOMINAL WALL AND OTHER FINDINGS:  Relative sclerosis measuring 1.4 cm at the right sacral ala may be  due to an enostosis, but blastic metastasis is possible.      The abdominal wall soft tissues appear normal.      Impression: CHEST  1.  Small to moderate bilateral pleural effusions with compressive  posterior basilar atelectasis. There are additional infiltrates  greater in the upper lobes probably from multifocal pneumonia.  2. Expansile lytic lesion the left 11th rib, probably metastasis  although primary bone malignancy is possible.  3. Borderline AP window node.      ABDOMEN - PELVIS  1.  1.5 cm sclerosis at the right sacral ala, possibly blastic  metastasis. If further evaluation is needed a bone scan would be  recommended for skeletal survey.  2. Moderate diverticulosis.      MACRO:  1.      Signed by: Robert Alarcon 5/10/2024 9:19 AM  Dictation workstation:   [id number]      Physical Exam    Relevant Results               Assessment/Plan            Ike Roca MD    Physician  Internal Medicine     H&P      Addendum     Date of Service: 5/6/2024  8:11 AM     Addendum       Expand All Collapse All    History Of Present Illness  Rodo Fam is a 89 y.o. male  Rodo Fam is an 89 year old male who presents to the emergency department from home after a fall at home. He reports that he got up to use the bathroom, lost his balance and fell onto his right side. Upon arrival to the emergency department, his oxygen saturation was 93% on room air.  Patient denies feeling dizzy before the fall.  He denies hitting his head.  He does complain of some pain on his right side, mainly his right hip.  He also complains of back pain and a productive cough for 3 weeks.  He denies any fevers.  He denies any shortness of breath.  No problems moving his bowel or bladder.  He has a past medical history of atrial fibrillation and he is on Xarelto, monoclonal gammopathy of unknown significance, hypertension, hyperlipidemia, aortic valve stenosis, skin cancer and prostate cancer.     ED Course  VS reviewed  Labs reviewed, results below   EKG unavailable for review  CXR reviewed, results below  CT head and C-spine reviewed, results below  Right hip and right pelvis x-ray reviewed, results below  IV Ceftriaxone  IV Zithromax     Past Medical History  Atrial fibrillation on Xarelto, Monoclonal gammopathy of unknown significance, hypertension, hyperlipidemia, aortic valve stenosis, skin cancer, prostate cancer     Surgical History  Hip replacement, prostatectomy, skin cancer excision     Social History  Nonsmoker, denies alcohol or drug use. Lives at home with his wife and does not use any ambulatory aids     Family History  Family History   No family history on file.        Allergies  Penicillins     Review of Systems   Constitutional: Negative.    HENT: Negative.     Eyes: Negative.    Respiratory:  Positive for cough.    Cardiovascular: Negative.    Gastrointestinal: Negative.    Endocrine:  "Negative.    Genitourinary: Negative.    Musculoskeletal:  Positive for back pain and myalgias.   Skin: Negative.    Allergic/Immunologic: Negative.    Neurological: Negative.    Hematological: Negative.    Psychiatric/Behavioral: Negative.           Physical Exam  Constitutional:       General: He is not in acute distress.     Appearance: Normal appearance.   HENT:      Head: Normocephalic.      Nose: Nose normal.      Mouth/Throat:      Mouth: Mucous membranes are moist.      Pharynx: Oropharynx is clear.   Eyes:      Extraocular Movements: Extraocular movements intact.      Pupils: Pupils are equal, round, and reactive to light.   Cardiovascular:      Rate and Rhythm: Normal rate and regular rhythm.      Pulses: Normal pulses.   Pulmonary:      Effort: Pulmonary effort is normal.      Breath sounds: Normal breath sounds.   Abdominal:      General: Bowel sounds are normal.      Palpations: Abdomen is soft.   Musculoskeletal:         General: Tenderness present. Normal range of motion.      Cervical back: Normal range of motion.      Right lower leg: No edema.      Left lower leg: No edema.      Comments: Right hip tenderness   Skin:     General: Skin is warm and dry.      Capillary Refill: Capillary refill takes less than 2 seconds.   Neurological:      General: No focal deficit present.      Mental Status: He is alert and oriented to person, place, and time.   Psychiatric:         Mood and Affect: Mood normal.         Behavior: Behavior normal.            Last Recorded Vitals  Blood pressure 145/64, pulse 82, resp. rate 18, height 1.778 m (5' 10\"), weight 81.6 kg (180 lb), SpO2 94%.     Relevant Results        Results for orders placed or performed during the hospital encounter of 05/06/24 (from the past 24 hour(s))   CBC and Auto Differential   Result Value Ref Range     WBC 10.9 4.4 - 11.3 x10*3/uL     nRBC 0.2 (H) 0.0 - 0.0 /100 WBCs     RBC 3.61 (L) 4.50 - 5.90 x10*6/uL     Hemoglobin 11.4 (L) 13.5 - 17.5 " g/dL     Hematocrit 34.7 (L) 41.0 - 52.0 %     MCV 96 80 - 100 fL     MCH 31.6 26.0 - 34.0 pg     MCHC 32.9 32.0 - 36.0 g/dL     RDW 13.5 11.5 - 14.5 %     Platelets 208 150 - 450 x10*3/uL     Neutrophils % 72.8 40.0 - 80.0 %     Immature Granulocytes %, Automated 4.5 (H) 0.0 - 0.9 %     Lymphocytes % 13.2 13.0 - 44.0 %     Monocytes % 8.6 2.0 - 10.0 %     Eosinophils % 0.4 0.0 - 6.0 %     Basophils % 0.5 0.0 - 2.0 %     Neutrophils Absolute 7.92 (H) 1.60 - 5.50 x10*3/uL     Immature Granulocytes Absolute, Automated 0.49 0.00 - 0.50 x10*3/uL     Lymphocytes Absolute 1.43 0.80 - 3.00 x10*3/uL     Monocytes Absolute 0.94 (H) 0.05 - 0.80 x10*3/uL     Eosinophils Absolute 0.04 0.00 - 0.40 x10*3/uL     Basophils Absolute 0.05 0.00 - 0.10 x10*3/uL   Comprehensive Metabolic Panel   Result Value Ref Range     Glucose 138 (H) 74 - 99 mg/dL     Sodium 140 136 - 145 mmol/L     Potassium 4.0 3.5 - 5.3 mmol/L     Chloride 106 98 - 107 mmol/L     Bicarbonate 18 (L) 21 - 32 mmol/L     Anion Gap 20 10 - 20 mmol/L     Urea Nitrogen 21 6 - 23 mg/dL     Creatinine 0.93 0.50 - 1.30 mg/dL     eGFR 78 >60 mL/min/1.73m*2     Calcium 8.6 8.6 - 10.3 mg/dL     Albumin 3.6 3.4 - 5.0 g/dL     Alkaline Phosphatase 100 33 - 136 U/L     Total Protein 6.8 6.4 - 8.2 g/dL     AST 38 9 - 39 U/L     Bilirubin, Total 0.8 0.0 - 1.2 mg/dL     ALT 16 10 - 52 U/L   Protime-INR   Result Value Ref Range     Protime 20.9 (H) 9.8 - 12.8 seconds     INR 1.8 (H) 0.9 - 1.1   Type And Screen   Result Value Ref Range     ABO TYPE O       Rh TYPE POS       ANTIBODY SCREEN NEG     Sars-CoV-2 PCR   Result Value Ref Range     Coronavirus 2019, PCR Not Detected Not Detected   Urinalysis with Reflex Culture and Microscopic   Result Value Ref Range     Color, Urine Yellow Straw, Yellow     Appearance, Urine Clear Clear     Specific Gravity, Urine 1.024 1.005 - 1.035     pH, Urine 5.0 5.0, 5.5, 6.0, 6.5, 7.0, 7.5, 8.0     Protein, Urine 30 (1+) (N) NEGATIVE mg/dL      Glucose, Urine NEGATIVE NEGATIVE mg/dL     Blood, Urine NEGATIVE NEGATIVE     Ketones, Urine 20 (1+) (A) NEGATIVE mg/dL     Bilirubin, Urine NEGATIVE NEGATIVE     Urobilinogen, Urine 2.0 (N) <2.0 mg/dL     Nitrite, Urine NEGATIVE NEGATIVE     Leukocyte Esterase, Urine NEGATIVE NEGATIVE   Urinalysis Microscopic   Result Value Ref Range     WBC, Urine 1-5 1-5, NONE /HPF     RBC, Urine NONE NONE, 1-2, 3-5 /HPF     Mucus, Urine 1+ Reference range not established. /LPF     Hyaline Casts, Urine OCCASIONAL (A) NONE /LPF   XR hip right with pelvis when performed 2 or 3 views     Result Date: 5/6/2024  Interpreted By:  Finkelstein, Evan, STUDY: XR HIP RIGHT WITH PELVIS WHEN PERFORMED 2 OR 3 VIEWS;  5/6/2024 4:51 am three views right hip. AP view of the pelvis   INDICATION: Signs/Symptoms:fall, right lateral hip pain.   COMPARISON: Pelvic radiograph 04/30/2017   ACCESSION NUMBER(S): [id number]   ORDERING CLINICIAN: ARNAV BURCIAGA   FINDINGS: Right hip arthroplasty hardware is present and appears intact. No surrounding lucency or periprosthetic fracture. Bones are demineralized. There are vascular calcifications. No acute displaced fracture or dislocation of the pelvis. No widening of the pubic symphysis. Degenerative changes in the visualized lower lumbosacral spine.        Right hip arthroplasty hardware present without surrounding lucency or periprosthetic fracture. No acute displaced fracture or dislocation of the pelvis.     MACRO: None.   Signed by: Evan Finkelstein 5/6/2024 6:00 AM Dictation workstation:   [id number]     XR chest 1 view     Result Date: 5/6/2024  Interpreted By:  Finkelstein, Evan, STUDY: XR CHEST 1 VIEW;  5/6/2024 4:31 am   INDICATION: Signs/Symptoms:Trauma.   COMPARISON: Chest radiograph 04/30/2017   ACCESSION NUMBER(S): [id number]   ORDERING CLINICIAN: ARNAV BURCIAGA   FINDINGS:     CARDIOMEDIASTINAL SILHOUETTE: Enlarged cardiac silhouette, similar compared to prior imaging. Calcifications overlie the  aortic arch.   LUNGS: Patchy airspace opacities overlying the upper aspect of the lungs bilaterally. No pneumothorax   ABDOMEN: No remarkable upper abdominal findings.   BONES: There are irregularities along the lateral aspect of multiple left-sided ribs, which appear similar compared to prior imaging.        Patchy airspace opacities overlying the upper aspect of the lungs bilaterally. Findings may represent pulmonary contusions in the setting of trauma. Recommend follow-up to resolution. Irregularities along the lateral aspect of multiple left-sided ribs, which overall appears similar compared to prior imaging.   MACRO: None.   Signed by: Evan Finkelstein 5/6/2024 5:58 AM Dictation workstation:   [id number]     CT head W O contrast trauma protocol     Result Date: 5/6/2024  Interpreted By:  Finkelstein, Evan, STUDY: CT HEAD W/O CONTRAST TRAUMA PROTOCOL; CT CERVICAL SPINE WO IV CONTRAST;  5/6/2024 4:22 am   INDICATION: Signs/Symptoms:fall on thinners.   COMPARISON: CT brain 04/30/2017   ACCESSION NUMBER(S): [id number]; [id number]   ORDERING CLINICIAN: ARNAV BURCIAGA   TECHNIQUE: Noncontrast CT images of the head with coronal and sagittal reconstructions. Axial noncontrast CT images of the cervical spine with coronal and sagittal reconstructed images.   FINDINGS: EXTRACRANIAL SOFT TISSUES: Unremarkable.   CALVARIUM: No depressed skull fracture. No destructive osseous lesion.   PARANASAL SINUSES/MASTOIDS: The visualized paranasal sinuses and mastoid air cells are aerated.   HEMORRHAGE: No acute intracranial hemorrhage.   BRAIN PARENCHYMA: Gray-white matter interfaces are preserved. No mass effect or midline shift. There are nonspecific scattered white matter hypodensities.   VENTRICLES and EXTRA-AXIAL SPACES: Parenchymal atrophy with prominence of the ventricles and cortical sulci.   OTHER FINDINGS: There are calcifications within the cavernous carotids   CERVICAL SPINE:   ALIGNMENT: No traumatic malalignment  VERTEBRAE: No acute loss of vertebral body height. Bones are demineralized DISC SPACE: Bony fusion across the C5/C6 disc space. SPINAL CANAL: Multilevel facet and uncovertebral arthropathy with varying degrees of neural foraminal stenosis. No severe central narrowing. PREVERTEBRAL SOFT TISSUES: No prevertebral soft tissue swelling. LUNG APICES: Imaged portion of the lung apices are within normal limits.   OTHER FINDINGS: None.          No acute intracranial hemorrhage, mass effect or midline shift.   Nonspecific scattered white matter hypodensities favored to represent sequela of small vessel ischemia. Cervical spondylosis without acute loss of vertebral body height or traumatic malalignment.     MACRO: None.   Signed by: Evan Finkelstein 5/6/2024 4:43 AM Dictation workstation:   [id number]     CT cervical spine wo IV contrast     Result Date: 5/6/2024  Interpreted By:  Finkelstein, Evan, STUDY: CT HEAD W/O CONTRAST TRAUMA PROTOCOL; CT CERVICAL SPINE WO IV CONTRAST;  5/6/2024 4:22 am   INDICATION: Signs/Symptoms:fall on thinners.   COMPARISON: CT brain 04/30/2017   ACCESSION NUMBER(S): [id number]; [id number]   ORDERING CLINICIAN: ARNAV BURCIAGA   TECHNIQUE: Noncontrast CT images of the head with coronal and sagittal reconstructions. Axial noncontrast CT images of the cervical spine with coronal and sagittal reconstructed images.   FINDINGS: EXTRACRANIAL SOFT TISSUES: Unremarkable.   CALVARIUM: No depressed skull fracture. No destructive osseous lesion.   PARANASAL SINUSES/MASTOIDS: The visualized paranasal sinuses and mastoid air cells are aerated.   HEMORRHAGE: No acute intracranial hemorrhage.   BRAIN PARENCHYMA: Gray-white matter interfaces are preserved. No mass effect or midline shift. There are nonspecific scattered white matter hypodensities.   VENTRICLES and EXTRA-AXIAL SPACES: Parenchymal atrophy with prominence of the ventricles and cortical sulci.   OTHER FINDINGS: There are calcifications within the  cavernous carotids   CERVICAL SPINE:   ALIGNMENT: No traumatic malalignment VERTEBRAE: No acute loss of vertebral body height. Bones are demineralized DISC SPACE: Bony fusion across the C5/C6 disc space. SPINAL CANAL: Multilevel facet and uncovertebral arthropathy with varying degrees of neural foraminal stenosis. No severe central narrowing. PREVERTEBRAL SOFT TISSUES: No prevertebral soft tissue swelling. LUNG APICES: Imaged portion of the lung apices are within normal limits.   OTHER FINDINGS: None.          No acute intracranial hemorrhage, mass effect or midline shift.   Nonspecific scattered white matter hypodensities favored to represent sequela of small vessel ischemia. Cervical spondylosis without acute loss of vertebral body height or traumatic malalignment.     MACRO: None.   Signed by: Evan Finkelstein 5/6/2024 4:43 AM Dictation workstation:   [id number]             Assessment/Plan   Principal Problem:    Pneumonia due to other specified bacteria (Multi)  Fall at home  Admit to general medicine under Dr. Roca  Acute, observation, telemetry  Continue IV Ceftriaxone and IV Zithromax  Oxygen, titrate to maintain a saturation of 92% or greater  Pain control  CBC and CMP in AM  PT/OT     Chronic conditions  Atrial fibrillation on Xarelto, Monoclonal gammopathy of unknown significance, hypertension, hyperlipidemia, aortic valve stenosis, skin cancer, prostate cancer  Continue home medications as appropriate     DVT Prophylaxis  No chemical prophylaxis, patient takes Xarelto  SCDs           I spent 45 minutes in the professional and overall care of this patient.  Patient fully evaluated and plan as above     Ashley Bonilla, LONNIE-CNP                 Revision History       Patient fully evaluated on May 7. Vitals stable and not on supplemental O2. White count normal with hemoglobin 11.3 and hematocrit 33.4. Bicarb is low at 20 with normal kidney function. COVID swab was negative with pending strep pneumonia  and legionella swabs. UA showed positive protein, ketones, urobilinogen, and occasional hyaline casts. Urine culture is pending. Per radiologist report, chest XR performed on 5/6/24 showed possible pulmonary contusion with recommendations to have follow up imaging for resolution status. CT of head and right hip XR performed on 5/6/24. Patient currently on azithromycin and ceftriaxone. Will ordered CT thoracic spine due to midthoracic spine pain. Start Medrol dose pack. Continue present IV antibiotics. Repeat labs in AM. Continue to monitor and support with appropriate analgesia.     Patient fully evaluated on May 8. Vitals stable. White count normal with stable H/H, bicarb still low at 19, and AST 42. Patient is negative for strep pneumonia and legionella. CT thoracic spine is pending. PT cleared patient for discharge and will follow with PT as outpatient. Patient currently on methylprednisolone for management. Will switch him to Levaquin. Repeat labs in AM. Continue to support with appropriate analgesia.     Patient fully evaluated on May 9. Vitals stable. Labs show white count 12.6 with stable H/H, BUN 30, and normal Cr and GFR. Per radiologist report, CT of thoracic spine performed 5/8/24 showed possible malignancy of 11th rib, no fracture, and bilateral pleural effusion with recommendations for chest CT. Patient with a significant history of prostate and skin cancer, will order chest CT and CT of abdomen/pelvis. He is currently on levofloxacin and methylprednisolone. Repeat labs in the AM. Continue to monitor and support with appropriate analgesia.     Patient fully evaluated on May 10. Vitals stable. Labs show white count 11.8 with stable H/H. CMP stable with borderline low sodium at 132. PSA level still pending. CT chest, abdomen, and pelvis performed on 5/9/24. Please refer to the report for full details.  Hematology saw the patient today with recommendations to check myeloma markers, PSA level, and bone  scan. Bone scan is ordered, pending results. While rounding, patient is accompanied by his wife and continues to complain of back and leg pain. Patient's wife does not think he should go home yet and thinks he needs rehab before discharge. Will start high dose steroids. Upon discharge, when medically cleared, will send to skilled nursing. Repeat labs in AM. Continue monitor and support.     Principal Problem:    Pneumonia due to other specified bacteria (Multi)  Active Problems:    Fall, initial encounter                EWELINA WILHELM

## 2024-05-10 NOTE — PROGRESS NOTES
Physical Therapy    Physical Therapy Treatment    Patient Name: Rodo Fam  MRN: [id number]  Today's Date: 5/10/2024  Time Calculation  Start Time: 0928  Stop Time: 0952  Time Calculation (min): 24 min    Assessment/Plan   PT Assessment  End of Session Patient Position: Up in chair, Alarm on     PT Plan  Treatment/Interventions: Bed mobility, Gait training, Transfer training, Stair training, Balance training, Therapeutic exercise, Therapeutic activity  PT Plan: Skilled PT  PT Frequency: 3 times per week  PT Discharge Recommendations: Low intensity level of continued care (out patient therapy to address back pain)  Equipment Recommended upon Discharge:  (possibly ww pending progress during acute los)  PT - OK to Discharge: Yes (to next level of care when cleared by medical team)      General Visit Information:   PT  Visit  PT Received On: 05/10/24  General  Prior to Session Communication: Bedside nurse  Patient Position Received: Up in chair, Alarm on  General Comment:  (pt pleasant; agreeable to participate in therapy session)    Subjective   Precautions:  Precautions  Medical Precautions: Fall precautions       Objective   Pain:  Pain Assessment  Pain Assessment: 0-10  Pain Score: 9  Pain Location: Back     Treatments:  Therapeutic Exercise  Therapeutic Exercise Performed:  (seated BLE AP, LAQ, and marching x 10 ea.)    Bed Mobility  Bed Mobility:  (NT - pt seated in chair upon therapy arrival and departure)    Ambulation/Gait Training  Ambulation/Gait Training Performed:  (pt ambulates ~50 ft x 2 with FWW and CGA.  mild cuing at times for maintaining safe walker positioning, especially upon fatiguing.)    Transfers  Transfer:  (sit <> stand with FWW and CGA.  cuing for safe hand placement)    Outcome Measures:  [facility] Basic Mobility  Turning from your back to your side while in a flat bed without using bedrails: A little  Moving from lying on your back to sitting on the side of a flat bed without using bedrails: A  little  Moving to and from bed to chair (including a wheelchair): A little  Standing up from a chair using your arms (e.g. wheelchair or bedside chair): A little  To walk in hospital room: A little  Climbing 3-5 steps with railing: A little  Basic Mobility - Total Score: 18    Education Documentation  Mobility Training, taught by Eloisa Chaudhari PTA at 5/10/2024 10:28 AM.  Learner: Patient  Readiness: Acceptance  Method: Explanation  Response: Verbalizes Understanding    Education Comments  No comments found.        EDUCATION:       Encounter Problems       Encounter Problems (Active)       PT Problem       STG - Pt will transition supine <> sitting with modified independence using log rolling technique  (Progressing)       Start:  05/06/24    Expected End:  05/20/24            STG - Pt will transfer STS with modified independence   (Progressing)       Start:  05/06/24    Expected End:  05/20/24            STG - Pt will amb >=[id number] appropriate assistive device with modified independence  (Progressing)       Start:  05/06/24    Expected End:  05/20/24            STG -  Pt will navigate >=3 stairs using rail with sba  (Progressing)       Start:  05/06/24    Expected End:  05/20/24

## 2024-05-10 NOTE — CONSULTS
Rodo Fam is a 89 y.o. male on day 3 of admission presenting with Pneumonia due to other specified bacteria (Multi).    HPI    Patient is a 89-year-old male with medical history significant for atrial fibrillation on Xarelto, monoclonal gammopathy of unknown significance, hypertension, hyperlipidemia, aortic valve stenosis, skin cancer, prostate cancer presenting to [facility] from home after a fall.  Patient noted that he was trying to use the bathroom and lost his balance and fell to his right side.  Patient denies hitting his head, denies feeling short of breath, did endorse some pain on his right side mainly his right hip and back pain otherwise no other issues.  He denies fevers, chills, nausea, vomiting, shortness of breath, chest pain, changes in bowel or urinary habits.  Hematology oncology team was consulted for lytic lesion findings on CT imaging.    CT imaging results of chest abdomen pelvis shows an expansive lytic lesion of the left 11th rib suspect malignancy versus metastasis as well as a 1.5 cm sclerosis of the right sacral ala.    Past Medical History: Basal cell carcinoma of the skin (2020), malignant melanoma (2018), MGUS (2010), squamous cell skin cancer (2020), prostate cancer  Surgical History: Reviewed in patient's chart  Social History: Former smoker.  .  Family History : Reviewed patient's chart    Review of Systems     12 point review of systems was unremarkable aside from HPI.    OBJECTIVE    Vitals:    05/10/24 0128 05/10/24 0637 05/10/24 0903 05/10/24 1007   BP: 150/70 146/70 134/65 152/67   BP Location:    Right arm   Patient Position:    Sitting   Pulse: 74 80 82 75   Resp:   18 18   Temp: 36 °C (96.8 °F) 36 °C (96.8 °F) 36.2 °C (97.2 °F) 36.5 °C (97.7 °F)   TempSrc:   Temporal Temporal   SpO2: 96% 92% 93% 94%   Weight:       Height:          Results from last 7 days   Lab Units 05/10/24  0650 05/07/24  0631 05/06/24  0413   WBC AUTO x10*3/uL 11.8*   < > 10.9   HEMOGLOBIN g/dL  11.7*   < > 11.4*   HEMATOCRIT % 34.4*   < > 34.7*   PLATELETS AUTO x10*3/uL 212   < > 208   NEUTROS PCT AUTO %  --   --  72.8   LYMPHS PCT AUTO %  --   --  13.2   MONOS PCT AUTO %  --   --  8.6   EOS PCT AUTO %  --   --  0.4    < > = values in this interval not displayed.     Results from last 7 days   Lab Units 05/10/24  0650   SODIUM mmol/L 132*   POTASSIUM mmol/L 4.3   CHLORIDE mmol/L 101   CO2 mmol/L 20*   BUN mg/dL 28*   CREATININE mg/dL 0.94   CALCIUM mg/dL 8.9   PROTEIN TOTAL g/dL 6.4   BILIRUBIN TOTAL mg/dL 0.5   ALK PHOS U/L 91   ALT U/L 28   AST U/L 41*   GLUCOSE mg/dL 137*       Scheduled Medications  amLODIPine, 5 mg, oral, Daily  levoFLOXacin, 500 mg, oral, q24h LUIS  lidocaine, 1 patch, transdermal, Daily  [START ON 5/11/2024] methylPREDNISolone, 8 mg, oral, Once   Followed by  [START ON 5/12/2024] methylPREDNISolone, 4 mg, oral, Once  rivaroxaban, 20 mg, oral, Daily           Physical Exam     Physical Exam:  General:  Pleasant and cooperative. No apparent distress.  HEENT:  Normocephalic, atraumatic, mucus membranes moist.   Neck:  Trachea midline.  No JVD.    Chest:  Clear to auscultation bilaterally. No wheezes, rales, or rhonchi.  CV:  Regular rate and rhythm.  Positive S1/S2.   Abdomen: Bowel sounds present in all four quadrants, abdomen is soft, non-tender, non-distended.  Extremities:  R hip tenderness  Neurological:  AAOx3. No focal deficits.  Skin:  Warm and dry.      ASSESSMENT & PLAN    MGUS  Lytic bone lesion with underlying concern for primary bone cancer versus metastatic disease  Leukocytosis suspect to be reactive  Chronic anemia    -Primary bone cancer versus malignant cancer is lower on the differential at this time however given patient's history of MGUS as well as CT imaging findings we will proceed with a skeletal survey at this time for further evaluation.  -Patient follows with Dr. Wild at [facility] for his MGUS.  Last seen February 2024.  At that time patient's M protein level  unchanged with normal kappa lambda ratio.  No crab criteria.  Low risk for plasma cell dyscrasia.  Plan for repeating CBC, BMP, LFT, serum protein electrophoresis and immunofixation kappa lambda serum OV in 1 year    Phil Pereira,   PGY-2, Internal Medicine   Please SecureChat for any further questions  This is a preliminary note, please await attending attestation for final A/P

## 2024-05-11 NOTE — CARE PLAN
Problem: Pain  Goal: My pain/discomfort is manageable  Outcome: Progressing     Problem: Safety  Goal: Patient will be injury free during hospitalization  Outcome: Progressing  Goal: I will remain free of falls  Outcome: Progressing     Problem: Daily Care  Goal: Daily care needs are met  Outcome: Progressing     Problem: Psychosocial Needs  Goal: Demonstrates ability to cope with hospitalization/illness  Outcome: Progressing  Goal: Collaborate with me, my family, and caregiver to identify my specific goals  Outcome: Progressing     Problem: Discharge Barriers  Goal: My discharge needs are met  Outcome: Progressing     Problem: Skin  Goal: Decreased wound size/increased tissue granulation at next dressing change  Outcome: Progressing  Goal: Participates in plan/prevention/treatment measures  Outcome: Progressing  Goal: Prevent/manage excess moisture  Outcome: Progressing  Goal: Prevent/minimize sheer/friction injuries  Outcome: Progressing  Goal: Promote/optimize nutrition  Outcome: Progressing  Goal: Promote skin healing  Outcome: Progressing   The patient's goals for the shift include      The clinical goals for the shift include Patient will remain comfortable throughout shift    Over the shift, the patient did not make progress toward the following goals. Barriers to progression include patient is still uncomfortable at times. Recommendations to address these barriers include remind patient to use call light when in need of assistance.

## 2024-05-11 NOTE — PROGRESS NOTES
Rodo Fam is a 89 y.o. male on day 4 of admission presenting with Pneumonia due to other specified bacteria (Multi).      Subjective          Objective     Last Recorded Vitals  /73 (BP Location: Right arm, Patient Position: Lying)   Pulse 72   Temp 35.9 °C (96.6 °F) (Temporal)   Resp 18   Wt 81.6 kg (180 lb)   SpO2 92%   Intake/Output last 3 Shifts:    Intake/Output Summary (Last 24 hours) at 5/11/2024 1309  Last data filed at 5/11/2024 1131  Gross per 24 hour   Intake 625 ml   Output 825 ml   Net -200 ml         Admission Weight  Weight: 81.6 kg (180 lb) (05/06/24 0800)    Daily Weight  05/06/24 : 81.6 kg (180 lb)    Image Results  CT chest abdomen pelvis w IV contrast  Narrative: Interpreted By:  Robert Alarcon,   STUDY:  CT CHEST ABDOMEN PELVIS W IV CONTRAST;  5/9/2024 6:23 pm      INDICATION:  Signs/Symptoms:pain      COMPARISON:  None.      ACCESSION NUMBER(S):  [id number]      ORDERING CLINICIAN:  ISAK KAMARA      TECHNIQUE:  CT of the chest, abdomen, and pelvis was performed.  Sequential transaxial images were obtained with orthogonal  reconstructions. 75mL of  Omnipaque 350 was injected intravenously.      FINDINGS:  CHEST:      LUNG/PLEURA/LARGE AIRWAYS:  There are small to moderate bilateral pleural effusions with mild  posterior basilar compressive atelectasis. Additionally there are  ground-glass and partially consolidating infiltrates at the upper  lobes with mild bronchiectasis, with additional small area of  ground-glass attenuation at the superior segment of the left lower  lobe, most likely from multifocal pneumonia.          VESSELS:  The thoracic aorta is of normal course and caliber , but with mild  atherosclerotic calcification .      Main pulmonary artery and its branches are normal in caliber.      There is moderate coronary artery atherosclerotic calcification  primarily involving the LAD.      HEART:  The heart is normal in size.  There is no pericardial effusion.       MEDIASTINUM AND VALE:  There is a borderline AP window node measuring 1 cm in short axis,  probably reactive. However, early malignant node is a consideration  given the left 11th rib lesion described below.      The thyroid gland as visualized appears unremarkable.      CHEST WALL AND LOWER NECK:  There is an expansile and lytic lesion of the left 11th rib  posteriorly, measuring 4.7 x 3 cm. This would be most consistent with  malignancy, probably metastatic but primary lesion such as  plasmacytoma etc. Is possible. There is hypodensity measuring  approximately 8 mm at the T7 and T8 endplates, probably benign  notochordal remnants. However, an early lytic lesion given the left  rib is also a consideration.      There is mild subcutaneous edema of the lower lateral chest walls,  greater on the right.      ABDOMEN:      LIVER:  The hepatic parenchyma is homogeneous without evidence of focal liver  lesions.The hepatic size is normal.      SPLEEN:  The spleen is normal in size and homogeneous.      ADRENAL GLANDS:  Bilateral adrenal glands appear normal.      KIDNEYS AND URETERS:  The renal cortices are unremarkable and the renal sizes within normal  limits , with cortical cyst(s).      The distal right ureter at the pelvis is obscured due to beam  hardening artifact from a right hip replacement. Otherwise no  identified urinary tract dilatation or calculi.      PANCREAS:  The pancreas appears unremarkable, there is no ductal dilatation or  masses.      GALLBLADDER:  No radiodense calculi, wall thickening or pericholecystic fluid.      BILE DUCTS:  There is no biliary dilatation or filling defects.          VESSELS:  There is moderate to extensive atherosclerotic calcification of the  aorta and iliac vessels, and proximal 1 cm segment of the right renal  artery. There is mild atherosclerotic calcification of the proximal  left renal artery. There is also moderate atherosclerotic plaque of  the superior mesenteric  artery proximally.      PERITONEUM AND RETROPERITONEUM:  No ascites considering obscured right lower pelvis due to the right  hip arthroplasty. No free intraperitoneal air.      The retroperitoneum appears unremarkable, and without significant  adenopathy.      No enlarged mesenteric lymph nodes.      BOWEL:  Moderate diverticulosis greatest at the distal descending and  proximal sigmoid colon. Bowel at the lower pelvis is partially  obscured again due to beam hardening artifact from a right hip  arthroplasty. There is mild gastric distention with intraluminal  content probably from recent ingestion. The bowel including the  appendix otherwise is unremarkable without significant dilatation or  mural thickening.      PELVIS:      BLADDER:  Partially obscured, otherwise grossly unremarkable.      REPRODUCTIVE ORGANS:  The pelvis, particularly right laterally is obscured by beam  hardening, otherwise grossly unremarkable.          BONE, ABDOMINAL WALL AND OTHER FINDINGS:  Relative sclerosis measuring 1.4 cm at the right sacral ala may be  due to an enostosis, but blastic metastasis is possible.      The abdominal wall soft tissues appear normal.      Impression: CHEST  1.  Small to moderate bilateral pleural effusions with compressive  posterior basilar atelectasis. There are additional infiltrates  greater in the upper lobes probably from multifocal pneumonia.  2. Expansile lytic lesion the left 11th rib, probably metastasis  although primary bone malignancy is possible.  3. Borderline AP window node.      ABDOMEN - PELVIS  1.  1.5 cm sclerosis at the right sacral ala, possibly blastic  metastasis. If further evaluation is needed a bone scan would be  recommended for skeletal survey.  2. Moderate diverticulosis.      MACRO:  1.      Signed by: Robert Alarcon 5/10/2024 9:19 AM  Dictation workstation:   [id number]      Physical Exam    Relevant Results               Assessment/Plan            Ike Roca MD    Physician  Internal Medicine     H&P      Addendum     Date of Service: 5/6/2024  8:11 AM     Addendum       Expand All Collapse All    History Of Present Illness  Rodo Fam is a 89 y.o. male  Rodo Fam is an 89 year old male who presents to the emergency department from home after a fall at home. He reports that he got up to use the bathroom, lost his balance and fell onto his right side. Upon arrival to the emergency department, his oxygen saturation was 93% on room air.  Patient denies feeling dizzy before the fall.  He denies hitting his head.  He does complain of some pain on his right side, mainly his right hip.  He also complains of back pain and a productive cough for 3 weeks.  He denies any fevers.  He denies any shortness of breath.  No problems moving his bowel or bladder.  He has a past medical history of atrial fibrillation and he is on Xarelto, monoclonal gammopathy of unknown significance, hypertension, hyperlipidemia, aortic valve stenosis, skin cancer and prostate cancer.     ED Course  VS reviewed  Labs reviewed, results below   EKG unavailable for review  CXR reviewed, results below  CT head and C-spine reviewed, results below  Right hip and right pelvis x-ray reviewed, results below  IV Ceftriaxone  IV Zithromax     Past Medical History  Atrial fibrillation on Xarelto, Monoclonal gammopathy of unknown significance, hypertension, hyperlipidemia, aortic valve stenosis, skin cancer, prostate cancer     Surgical History  Hip replacement, prostatectomy, skin cancer excision     Social History  Nonsmoker, denies alcohol or drug use. Lives at home with his wife and does not use any ambulatory aids     Family History  Family History   No family history on file.        Allergies  Penicillins     Review of Systems   Constitutional: Negative.    HENT: Negative.     Eyes: Negative.    Respiratory:  Positive for cough.    Cardiovascular: Negative.    Gastrointestinal: Negative.    Endocrine:  "Negative.    Genitourinary: Negative.    Musculoskeletal:  Positive for back pain and myalgias.   Skin: Negative.    Allergic/Immunologic: Negative.    Neurological: Negative.    Hematological: Negative.    Psychiatric/Behavioral: Negative.           Physical Exam  Constitutional:       General: He is not in acute distress.     Appearance: Normal appearance.   HENT:      Head: Normocephalic.      Nose: Nose normal.      Mouth/Throat:      Mouth: Mucous membranes are moist.      Pharynx: Oropharynx is clear.   Eyes:      Extraocular Movements: Extraocular movements intact.      Pupils: Pupils are equal, round, and reactive to light.   Cardiovascular:      Rate and Rhythm: Normal rate and regular rhythm.      Pulses: Normal pulses.   Pulmonary:      Effort: Pulmonary effort is normal.      Breath sounds: Normal breath sounds.   Abdominal:      General: Bowel sounds are normal.      Palpations: Abdomen is soft.   Musculoskeletal:         General: Tenderness present. Normal range of motion.      Cervical back: Normal range of motion.      Right lower leg: No edema.      Left lower leg: No edema.      Comments: Right hip tenderness   Skin:     General: Skin is warm and dry.      Capillary Refill: Capillary refill takes less than 2 seconds.   Neurological:      General: No focal deficit present.      Mental Status: He is alert and oriented to person, place, and time.   Psychiatric:         Mood and Affect: Mood normal.         Behavior: Behavior normal.            Last Recorded Vitals  Blood pressure 145/64, pulse 82, resp. rate 18, height 1.778 m (5' 10\"), weight 81.6 kg (180 lb), SpO2 94%.     Relevant Results        Results for orders placed or performed during the hospital encounter of 05/06/24 (from the past 24 hour(s))   CBC and Auto Differential   Result Value Ref Range     WBC 10.9 4.4 - 11.3 x10*3/uL     nRBC 0.2 (H) 0.0 - 0.0 /100 WBCs     RBC 3.61 (L) 4.50 - 5.90 x10*6/uL     Hemoglobin 11.4 (L) 13.5 - 17.5 " g/dL     Hematocrit 34.7 (L) 41.0 - 52.0 %     MCV 96 80 - 100 fL     MCH 31.6 26.0 - 34.0 pg     MCHC 32.9 32.0 - 36.0 g/dL     RDW 13.5 11.5 - 14.5 %     Platelets 208 150 - 450 x10*3/uL     Neutrophils % 72.8 40.0 - 80.0 %     Immature Granulocytes %, Automated 4.5 (H) 0.0 - 0.9 %     Lymphocytes % 13.2 13.0 - 44.0 %     Monocytes % 8.6 2.0 - 10.0 %     Eosinophils % 0.4 0.0 - 6.0 %     Basophils % 0.5 0.0 - 2.0 %     Neutrophils Absolute 7.92 (H) 1.60 - 5.50 x10*3/uL     Immature Granulocytes Absolute, Automated 0.49 0.00 - 0.50 x10*3/uL     Lymphocytes Absolute 1.43 0.80 - 3.00 x10*3/uL     Monocytes Absolute 0.94 (H) 0.05 - 0.80 x10*3/uL     Eosinophils Absolute 0.04 0.00 - 0.40 x10*3/uL     Basophils Absolute 0.05 0.00 - 0.10 x10*3/uL   Comprehensive Metabolic Panel   Result Value Ref Range     Glucose 138 (H) 74 - 99 mg/dL     Sodium 140 136 - 145 mmol/L     Potassium 4.0 3.5 - 5.3 mmol/L     Chloride 106 98 - 107 mmol/L     Bicarbonate 18 (L) 21 - 32 mmol/L     Anion Gap 20 10 - 20 mmol/L     Urea Nitrogen 21 6 - 23 mg/dL     Creatinine 0.93 0.50 - 1.30 mg/dL     eGFR 78 >60 mL/min/1.73m*2     Calcium 8.6 8.6 - 10.3 mg/dL     Albumin 3.6 3.4 - 5.0 g/dL     Alkaline Phosphatase 100 33 - 136 U/L     Total Protein 6.8 6.4 - 8.2 g/dL     AST 38 9 - 39 U/L     Bilirubin, Total 0.8 0.0 - 1.2 mg/dL     ALT 16 10 - 52 U/L   Protime-INR   Result Value Ref Range     Protime 20.9 (H) 9.8 - 12.8 seconds     INR 1.8 (H) 0.9 - 1.1   Type And Screen   Result Value Ref Range     ABO TYPE O       Rh TYPE POS       ANTIBODY SCREEN NEG     Sars-CoV-2 PCR   Result Value Ref Range     Coronavirus 2019, PCR Not Detected Not Detected   Urinalysis with Reflex Culture and Microscopic   Result Value Ref Range     Color, Urine Yellow Straw, Yellow     Appearance, Urine Clear Clear     Specific Gravity, Urine 1.024 1.005 - 1.035     pH, Urine 5.0 5.0, 5.5, 6.0, 6.5, 7.0, 7.5, 8.0     Protein, Urine 30 (1+) (N) NEGATIVE mg/dL      Glucose, Urine NEGATIVE NEGATIVE mg/dL     Blood, Urine NEGATIVE NEGATIVE     Ketones, Urine 20 (1+) (A) NEGATIVE mg/dL     Bilirubin, Urine NEGATIVE NEGATIVE     Urobilinogen, Urine 2.0 (N) <2.0 mg/dL     Nitrite, Urine NEGATIVE NEGATIVE     Leukocyte Esterase, Urine NEGATIVE NEGATIVE   Urinalysis Microscopic   Result Value Ref Range     WBC, Urine 1-5 1-5, NONE /HPF     RBC, Urine NONE NONE, 1-2, 3-5 /HPF     Mucus, Urine 1+ Reference range not established. /LPF     Hyaline Casts, Urine OCCASIONAL (A) NONE /LPF   XR hip right with pelvis when performed 2 or 3 views     Result Date: 5/6/2024  Interpreted By:  Finkelstein, Evan, STUDY: XR HIP RIGHT WITH PELVIS WHEN PERFORMED 2 OR 3 VIEWS;  5/6/2024 4:51 am three views right hip. AP view of the pelvis   INDICATION: Signs/Symptoms:fall, right lateral hip pain.   COMPARISON: Pelvic radiograph 04/30/2017   ACCESSION NUMBER(S): [id number]   ORDERING CLINICIAN: ARNAV BURCIAGA   FINDINGS: Right hip arthroplasty hardware is present and appears intact. No surrounding lucency or periprosthetic fracture. Bones are demineralized. There are vascular calcifications. No acute displaced fracture or dislocation of the pelvis. No widening of the pubic symphysis. Degenerative changes in the visualized lower lumbosacral spine.        Right hip arthroplasty hardware present without surrounding lucency or periprosthetic fracture. No acute displaced fracture or dislocation of the pelvis.     MACRO: None.   Signed by: Evan Finkelstein 5/6/2024 6:00 AM Dictation workstation:   [id number]     XR chest 1 view     Result Date: 5/6/2024  Interpreted By:  Finkelstein, Evan, STUDY: XR CHEST 1 VIEW;  5/6/2024 4:31 am   INDICATION: Signs/Symptoms:Trauma.   COMPARISON: Chest radiograph 04/30/2017   ACCESSION NUMBER(S): [id number]   ORDERING CLINICIAN: ARNAV BURCIAGA   FINDINGS:     CARDIOMEDIASTINAL SILHOUETTE: Enlarged cardiac silhouette, similar compared to prior imaging. Calcifications overlie the  aortic arch.   LUNGS: Patchy airspace opacities overlying the upper aspect of the lungs bilaterally. No pneumothorax   ABDOMEN: No remarkable upper abdominal findings.   BONES: There are irregularities along the lateral aspect of multiple left-sided ribs, which appear similar compared to prior imaging.        Patchy airspace opacities overlying the upper aspect of the lungs bilaterally. Findings may represent pulmonary contusions in the setting of trauma. Recommend follow-up to resolution. Irregularities along the lateral aspect of multiple left-sided ribs, which overall appears similar compared to prior imaging.   MACRO: None.   Signed by: Evan Finkelstein 5/6/2024 5:58 AM Dictation workstation:   [id number]     CT head W O contrast trauma protocol     Result Date: 5/6/2024  Interpreted By:  Finkelstein, Evan, STUDY: CT HEAD W/O CONTRAST TRAUMA PROTOCOL; CT CERVICAL SPINE WO IV CONTRAST;  5/6/2024 4:22 am   INDICATION: Signs/Symptoms:fall on thinners.   COMPARISON: CT brain 04/30/2017   ACCESSION NUMBER(S): [id number]; [id number]   ORDERING CLINICIAN: ARNAV BURCIAGA   TECHNIQUE: Noncontrast CT images of the head with coronal and sagittal reconstructions. Axial noncontrast CT images of the cervical spine with coronal and sagittal reconstructed images.   FINDINGS: EXTRACRANIAL SOFT TISSUES: Unremarkable.   CALVARIUM: No depressed skull fracture. No destructive osseous lesion.   PARANASAL SINUSES/MASTOIDS: The visualized paranasal sinuses and mastoid air cells are aerated.   HEMORRHAGE: No acute intracranial hemorrhage.   BRAIN PARENCHYMA: Gray-white matter interfaces are preserved. No mass effect or midline shift. There are nonspecific scattered white matter hypodensities.   VENTRICLES and EXTRA-AXIAL SPACES: Parenchymal atrophy with prominence of the ventricles and cortical sulci.   OTHER FINDINGS: There are calcifications within the cavernous carotids   CERVICAL SPINE:   ALIGNMENT: No traumatic malalignment  VERTEBRAE: No acute loss of vertebral body height. Bones are demineralized DISC SPACE: Bony fusion across the C5/C6 disc space. SPINAL CANAL: Multilevel facet and uncovertebral arthropathy with varying degrees of neural foraminal stenosis. No severe central narrowing. PREVERTEBRAL SOFT TISSUES: No prevertebral soft tissue swelling. LUNG APICES: Imaged portion of the lung apices are within normal limits.   OTHER FINDINGS: None.          No acute intracranial hemorrhage, mass effect or midline shift.   Nonspecific scattered white matter hypodensities favored to represent sequela of small vessel ischemia. Cervical spondylosis without acute loss of vertebral body height or traumatic malalignment.     MACRO: None.   Signed by: Evan Finkelstein 5/6/2024 4:43 AM Dictation workstation:   [id number]     CT cervical spine wo IV contrast     Result Date: 5/6/2024  Interpreted By:  Finkelstein, Evan, STUDY: CT HEAD W/O CONTRAST TRAUMA PROTOCOL; CT CERVICAL SPINE WO IV CONTRAST;  5/6/2024 4:22 am   INDICATION: Signs/Symptoms:fall on thinners.   COMPARISON: CT brain 04/30/2017   ACCESSION NUMBER(S): [id number]; [id number]   ORDERING CLINICIAN: ARNAV BURCIAGA   TECHNIQUE: Noncontrast CT images of the head with coronal and sagittal reconstructions. Axial noncontrast CT images of the cervical spine with coronal and sagittal reconstructed images.   FINDINGS: EXTRACRANIAL SOFT TISSUES: Unremarkable.   CALVARIUM: No depressed skull fracture. No destructive osseous lesion.   PARANASAL SINUSES/MASTOIDS: The visualized paranasal sinuses and mastoid air cells are aerated.   HEMORRHAGE: No acute intracranial hemorrhage.   BRAIN PARENCHYMA: Gray-white matter interfaces are preserved. No mass effect or midline shift. There are nonspecific scattered white matter hypodensities.   VENTRICLES and EXTRA-AXIAL SPACES: Parenchymal atrophy with prominence of the ventricles and cortical sulci.   OTHER FINDINGS: There are calcifications within the  cavernous carotids   CERVICAL SPINE:   ALIGNMENT: No traumatic malalignment VERTEBRAE: No acute loss of vertebral body height. Bones are demineralized DISC SPACE: Bony fusion across the C5/C6 disc space. SPINAL CANAL: Multilevel facet and uncovertebral arthropathy with varying degrees of neural foraminal stenosis. No severe central narrowing. PREVERTEBRAL SOFT TISSUES: No prevertebral soft tissue swelling. LUNG APICES: Imaged portion of the lung apices are within normal limits.   OTHER FINDINGS: None.          No acute intracranial hemorrhage, mass effect or midline shift.   Nonspecific scattered white matter hypodensities favored to represent sequela of small vessel ischemia. Cervical spondylosis without acute loss of vertebral body height or traumatic malalignment.     MACRO: None.   Signed by: Evan Finkelstein 5/6/2024 4:43 AM Dictation workstation:   [id number]             Assessment/Plan   Principal Problem:    Pneumonia due to other specified bacteria (Multi)  Fall at home  Admit to general medicine under Dr. Roca  Acute, observation, telemetry  Continue IV Ceftriaxone and IV Zithromax  Oxygen, titrate to maintain a saturation of 92% or greater  Pain control  CBC and CMP in AM  PT/OT     Chronic conditions  Atrial fibrillation on Xarelto, Monoclonal gammopathy of unknown significance, hypertension, hyperlipidemia, aortic valve stenosis, skin cancer, prostate cancer  Continue home medications as appropriate     DVT Prophylaxis  No chemical prophylaxis, patient takes Xarelto  SCDs           I spent 45 minutes in the professional and overall care of this patient.  Patient fully evaluated and plan as above     Ashley Bonilla, LONNIE-CNP                 Revision History       Patient fully evaluated on May 7. Vitals stable and not on supplemental O2. White count normal with hemoglobin 11.3 and hematocrit 33.4. Bicarb is low at 20 with normal kidney function. COVID swab was negative with pending strep pneumonia  and legionella swabs. UA showed positive protein, ketones, urobilinogen, and occasional hyaline casts. Urine culture is pending. Per radiologist report, chest XR performed on 5/6/24 showed possible pulmonary contusion with recommendations to have follow up imaging for resolution status. CT of head and right hip XR performed on 5/6/24. Patient currently on azithromycin and ceftriaxone. Will ordered CT thoracic spine due to midthoracic spine pain. Start Medrol dose pack. Continue present IV antibiotics. Repeat labs in AM. Continue to monitor and support with appropriate analgesia.     Patient fully evaluated on May 8. Vitals stable. White count normal with stable H/H, bicarb still low at 19, and AST 42. Patient is negative for strep pneumonia and legionella. CT thoracic spine is pending. PT cleared patient for discharge and will follow with PT as outpatient. Patient currently on methylprednisolone for management. Will switch him to Levaquin. Repeat labs in AM. Continue to support with appropriate analgesia.     Patient fully evaluated on May 9. Vitals stable. Labs show white count 12.6 with stable H/H, BUN 30, and normal Cr and GFR. Per radiologist report, CT of thoracic spine performed 5/8/24 showed possible malignancy of 11th rib, no fracture, and bilateral pleural effusion with recommendations for chest CT. Patient with a significant history of prostate and skin cancer, will order chest CT and CT of abdomen/pelvis. He is currently on levofloxacin and methylprednisolone. Repeat labs in the AM. Continue to monitor and support with appropriate analgesia.     Patient fully evaluated on May 10. Vitals stable. Labs show white count 11.8 with stable H/H. CMP stable with borderline low sodium at 132. PSA level still pending. CT chest, abdomen, and pelvis performed on 5/9/24. Please refer to the report for full details.  Hematology saw the patient today with recommendations to check myeloma markers, PSA level, and bone  scan. Bone scan is ordered, pending results. While rounding, patient is accompanied by his wife and continues to complain of back and leg pain. Patient's wife does not think he should go home yet and thinks he needs rehab before discharge. Will start high dose steroids. Upon discharge, when medically cleared, will send to skilled nursing. Repeat labs in AM. Continue monitor and support.     Principal Problem:    Pneumonia due to other specified bacteria (Multi)  Active Problems:    Fall, initial encounter  Patient fully evaluated on May 11 and still having significant pain today.  PSA is negative.  Further oncology workup is in progress with blood testing.  Patient agreeable to discharge to skilled nursing for further rehab and awaiting certification.  Recheck labs in AM.              Ike Roca MD

## 2024-05-12 NOTE — PROGRESS NOTES
Rodo Fam is a 89 y.o. male on day 5 of admission presenting with Pneumonia due to other specified bacteria (Multi).      Subjective          Objective     Last Recorded Vitals  /70 (BP Location: Left arm, Patient Position: Sitting)   Pulse 80   Temp 36 °C (96.8 °F) (Temporal)   Resp 18   Wt 81.6 kg (180 lb)   SpO2 92%   Intake/Output last 3 Shifts:    Intake/Output Summary (Last 24 hours) at 5/12/2024 1515  Last data filed at 5/12/2024 0542  Gross per 24 hour   Intake 100 ml   Output 675 ml   Net -575 ml         Admission Weight  Weight: 81.6 kg (180 lb) (05/06/24 0800)    Daily Weight  05/06/24 : 81.6 kg (180 lb)    Image Results  XR skeletal survey complete  Narrative: Interpreted By:  Bri Kim,   STUDY:  XR SKELETAL SURVEY COMPLETE 5/10/2024 7:09 pm      INDICATION:  Signs/Symptoms:Lytic lesion involving left 11th ribs      COMPARISON:  None available.      ACCESSION NUMBER(S):  [id number]      ORDERING CLINICIAN:  IKE KAMARA      TECHNIQUE:  Skeletal survey is performed.      FINDINGS:  Osteolytic bony destruction of the left 11th rib posteriorly is seen.  No additional osteolytic or osteoblastic bony lesions are identified.  There is no endosteal scalloping seen within the long bones. No  pathologic compression fractures of the spine are identified.      There is postoperative change from bipolar right hip arthroplasty.      Bilateral perihilar infiltrates are noted.      Impression: Solitary osteolytic lesion destroying posterior left 11th rib. 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 The possibility of a  solitary osteolytic bony metastasis is not excluded however.      Signed by: Bri Kim 5/11/2024 2:01 PM  Dictation workstation:   [id number]      Physical Exam    Relevant Results               Assessment/Plan            Ike Kamara MD   Physician  Internal Medicine     H&P      Addendum     Date of Service: 5/6/2024  8:11 AM     Addendum       Expand All Collapse  All    History Of Present Illness  Rodo Fam is a 89 y.o. male  Rodo Fam is an 89 year old male who presents to the emergency department from home after a fall at home. He reports that he got up to use the bathroom, lost his balance and fell onto his right side. Upon arrival to the emergency department, his oxygen saturation was 93% on room air.  Patient denies feeling dizzy before the fall.  He denies hitting his head.  He does complain of some pain on his right side, mainly his right hip.  He also complains of back pain and a productive cough for 3 weeks.  He denies any fevers.  He denies any shortness of breath.  No problems moving his bowel or bladder.  He has a past medical history of atrial fibrillation and he is on Xarelto, monoclonal gammopathy of unknown significance, hypertension, hyperlipidemia, aortic valve stenosis, skin cancer and prostate cancer.     ED Course  VS reviewed  Labs reviewed, results below   EKG unavailable for review  CXR reviewed, results below  CT head and C-spine reviewed, results below  Right hip and right pelvis x-ray reviewed, results below  IV Ceftriaxone  IV Zithromax     Past Medical History  Atrial fibrillation on Xarelto, Monoclonal gammopathy of unknown significance, hypertension, hyperlipidemia, aortic valve stenosis, skin cancer, prostate cancer     Surgical History  Hip replacement, prostatectomy, skin cancer excision     Social History  Nonsmoker, denies alcohol or drug use. Lives at home with his wife and does not use any ambulatory aids     Family History  Family History   No family history on file.        Allergies  Penicillins     Review of Systems   Constitutional: Negative.    HENT: Negative.     Eyes: Negative.    Respiratory:  Positive for cough.    Cardiovascular: Negative.    Gastrointestinal: Negative.    Endocrine: Negative.    Genitourinary: Negative.    Musculoskeletal:  Positive for back pain and myalgias.   Skin: Negative.    Allergic/Immunologic:  "Negative.    Neurological: Negative.    Hematological: Negative.    Psychiatric/Behavioral: Negative.           Physical Exam  Constitutional:       General: He is not in acute distress.     Appearance: Normal appearance.   HENT:      Head: Normocephalic.      Nose: Nose normal.      Mouth/Throat:      Mouth: Mucous membranes are moist.      Pharynx: Oropharynx is clear.   Eyes:      Extraocular Movements: Extraocular movements intact.      Pupils: Pupils are equal, round, and reactive to light.   Cardiovascular:      Rate and Rhythm: Normal rate and regular rhythm.      Pulses: Normal pulses.   Pulmonary:      Effort: Pulmonary effort is normal.      Breath sounds: Normal breath sounds.   Abdominal:      General: Bowel sounds are normal.      Palpations: Abdomen is soft.   Musculoskeletal:         General: Tenderness present. Normal range of motion.      Cervical back: Normal range of motion.      Right lower leg: No edema.      Left lower leg: No edema.      Comments: Right hip tenderness   Skin:     General: Skin is warm and dry.      Capillary Refill: Capillary refill takes less than 2 seconds.   Neurological:      General: No focal deficit present.      Mental Status: He is alert and oriented to person, place, and time.   Psychiatric:         Mood and Affect: Mood normal.         Behavior: Behavior normal.            Last Recorded Vitals  Blood pressure 145/64, pulse 82, resp. rate 18, height 1.778 m (5' 10\"), weight 81.6 kg (180 lb), SpO2 94%.     Relevant Results        Results for orders placed or performed during the hospital encounter of 05/06/24 (from the past 24 hour(s))   CBC and Auto Differential   Result Value Ref Range     WBC 10.9 4.4 - 11.3 x10*3/uL     nRBC 0.2 (H) 0.0 - 0.0 /100 WBCs     RBC 3.61 (L) 4.50 - 5.90 x10*6/uL     Hemoglobin 11.4 (L) 13.5 - 17.5 g/dL     Hematocrit 34.7 (L) 41.0 - 52.0 %     MCV 96 80 - 100 fL     MCH 31.6 26.0 - 34.0 pg     MCHC 32.9 32.0 - 36.0 g/dL     RDW 13.5 11.5 " - 14.5 %     Platelets 208 150 - 450 x10*3/uL     Neutrophils % 72.8 40.0 - 80.0 %     Immature Granulocytes %, Automated 4.5 (H) 0.0 - 0.9 %     Lymphocytes % 13.2 13.0 - 44.0 %     Monocytes % 8.6 2.0 - 10.0 %     Eosinophils % 0.4 0.0 - 6.0 %     Basophils % 0.5 0.0 - 2.0 %     Neutrophils Absolute 7.92 (H) 1.60 - 5.50 x10*3/uL     Immature Granulocytes Absolute, Automated 0.49 0.00 - 0.50 x10*3/uL     Lymphocytes Absolute 1.43 0.80 - 3.00 x10*3/uL     Monocytes Absolute 0.94 (H) 0.05 - 0.80 x10*3/uL     Eosinophils Absolute 0.04 0.00 - 0.40 x10*3/uL     Basophils Absolute 0.05 0.00 - 0.10 x10*3/uL   Comprehensive Metabolic Panel   Result Value Ref Range     Glucose 138 (H) 74 - 99 mg/dL     Sodium 140 136 - 145 mmol/L     Potassium 4.0 3.5 - 5.3 mmol/L     Chloride 106 98 - 107 mmol/L     Bicarbonate 18 (L) 21 - 32 mmol/L     Anion Gap 20 10 - 20 mmol/L     Urea Nitrogen 21 6 - 23 mg/dL     Creatinine 0.93 0.50 - 1.30 mg/dL     eGFR 78 >60 mL/min/1.73m*2     Calcium 8.6 8.6 - 10.3 mg/dL     Albumin 3.6 3.4 - 5.0 g/dL     Alkaline Phosphatase 100 33 - 136 U/L     Total Protein 6.8 6.4 - 8.2 g/dL     AST 38 9 - 39 U/L     Bilirubin, Total 0.8 0.0 - 1.2 mg/dL     ALT 16 10 - 52 U/L   Protime-INR   Result Value Ref Range     Protime 20.9 (H) 9.8 - 12.8 seconds     INR 1.8 (H) 0.9 - 1.1   Type And Screen   Result Value Ref Range     ABO TYPE O       Rh TYPE POS       ANTIBODY SCREEN NEG     Sars-CoV-2 PCR   Result Value Ref Range     Coronavirus 2019, PCR Not Detected Not Detected   Urinalysis with Reflex Culture and Microscopic   Result Value Ref Range     Color, Urine Yellow Straw, Yellow     Appearance, Urine Clear Clear     Specific Gravity, Urine 1.024 1.005 - 1.035     pH, Urine 5.0 5.0, 5.5, 6.0, 6.5, 7.0, 7.5, 8.0     Protein, Urine 30 (1+) (N) NEGATIVE mg/dL     Glucose, Urine NEGATIVE NEGATIVE mg/dL     Blood, Urine NEGATIVE NEGATIVE     Ketones, Urine 20 (1+) (A) NEGATIVE mg/dL     Bilirubin, Urine  NEGATIVE NEGATIVE     Urobilinogen, Urine 2.0 (N) <2.0 mg/dL     Nitrite, Urine NEGATIVE NEGATIVE     Leukocyte Esterase, Urine NEGATIVE NEGATIVE   Urinalysis Microscopic   Result Value Ref Range     WBC, Urine 1-5 1-5, NONE /HPF     RBC, Urine NONE NONE, 1-2, 3-5 /HPF     Mucus, Urine 1+ Reference range not established. /LPF     Hyaline Casts, Urine OCCASIONAL (A) NONE /LPF   XR hip right with pelvis when performed 2 or 3 views     Result Date: 5/6/2024  Interpreted By:  Finkelstein, Evan, STUDY: XR HIP RIGHT WITH PELVIS WHEN PERFORMED 2 OR 3 VIEWS;  5/6/2024 4:51 am three views right hip. AP view of the pelvis   INDICATION: Signs/Symptoms:fall, right lateral hip pain.   COMPARISON: Pelvic radiograph 04/30/2017   ACCESSION NUMBER(S): [id number]   ORDERING CLINICIAN: ARNAV BURCIAGA   FINDINGS: Right hip arthroplasty hardware is present and appears intact. No surrounding lucency or periprosthetic fracture. Bones are demineralized. There are vascular calcifications. No acute displaced fracture or dislocation of the pelvis. No widening of the pubic symphysis. Degenerative changes in the visualized lower lumbosacral spine.        Right hip arthroplasty hardware present without surrounding lucency or periprosthetic fracture. No acute displaced fracture or dislocation of the pelvis.     MACRO: None.   Signed by: Evan Finkelstein 5/6/2024 6:00 AM Dictation workstation:   [id number]     XR chest 1 view     Result Date: 5/6/2024  Interpreted By:  Finkelstein, Evan, STUDY: XR CHEST 1 VIEW;  5/6/2024 4:31 am   INDICATION: Signs/Symptoms:Trauma.   COMPARISON: Chest radiograph 04/30/2017   ACCESSION NUMBER(S): [id number]   ORDERING CLINICIAN: ARNAV BURCIAGA   FINDINGS:     CARDIOMEDIASTINAL SILHOUETTE: Enlarged cardiac silhouette, similar compared to prior imaging. Calcifications overlie the aortic arch.   LUNGS: Patchy airspace opacities overlying the upper aspect of the lungs bilaterally. No pneumothorax   ABDOMEN: No remarkable  upper abdominal findings.   BONES: There are irregularities along the lateral aspect of multiple left-sided ribs, which appear similar compared to prior imaging.        Patchy airspace opacities overlying the upper aspect of the lungs bilaterally. Findings may represent pulmonary contusions in the setting of trauma. Recommend follow-up to resolution. Irregularities along the lateral aspect of multiple left-sided ribs, which overall appears similar compared to prior imaging.   MACRO: None.   Signed by: Evan Finkelstein 5/6/2024 5:58 AM Dictation workstation:   [id number]     CT head W O contrast trauma protocol     Result Date: 5/6/2024  Interpreted By:  Finkelstein, Evan, STUDY: CT HEAD W/O CONTRAST TRAUMA PROTOCOL; CT CERVICAL SPINE WO IV CONTRAST;  5/6/2024 4:22 am   INDICATION: Signs/Symptoms:fall on thinners.   COMPARISON: CT brain 04/30/2017   ACCESSION NUMBER(S): [id number]; [id number]   ORDERING CLINICIAN: ARNAV BURCIAGA   TECHNIQUE: Noncontrast CT images of the head with coronal and sagittal reconstructions. Axial noncontrast CT images of the cervical spine with coronal and sagittal reconstructed images.   FINDINGS: EXTRACRANIAL SOFT TISSUES: Unremarkable.   CALVARIUM: No depressed skull fracture. No destructive osseous lesion.   PARANASAL SINUSES/MASTOIDS: The visualized paranasal sinuses and mastoid air cells are aerated.   HEMORRHAGE: No acute intracranial hemorrhage.   BRAIN PARENCHYMA: Gray-white matter interfaces are preserved. No mass effect or midline shift. There are nonspecific scattered white matter hypodensities.   VENTRICLES and EXTRA-AXIAL SPACES: Parenchymal atrophy with prominence of the ventricles and cortical sulci.   OTHER FINDINGS: There are calcifications within the cavernous carotids   CERVICAL SPINE:   ALIGNMENT: No traumatic malalignment VERTEBRAE: No acute loss of vertebral body height. Bones are demineralized DISC SPACE: Bony fusion across the C5/C6 disc space. SPINAL CANAL:  Multilevel facet and uncovertebral arthropathy with varying degrees of neural foraminal stenosis. No severe central narrowing. PREVERTEBRAL SOFT TISSUES: No prevertebral soft tissue swelling. LUNG APICES: Imaged portion of the lung apices are within normal limits.   OTHER FINDINGS: None.          No acute intracranial hemorrhage, mass effect or midline shift.   Nonspecific scattered white matter hypodensities favored to represent sequela of small vessel ischemia. Cervical spondylosis without acute loss of vertebral body height or traumatic malalignment.     MACRO: None.   Signed by: Evan Finkelstein 5/6/2024 4:43 AM Dictation workstation:   [id number]     CT cervical spine wo IV contrast     Result Date: 5/6/2024  Interpreted By:  Finkelstein, Evan, STUDY: CT HEAD W/O CONTRAST TRAUMA PROTOCOL; CT CERVICAL SPINE WO IV CONTRAST;  5/6/2024 4:22 am   INDICATION: Signs/Symptoms:fall on thinners.   COMPARISON: CT brain 04/30/2017   ACCESSION NUMBER(S): [id number]; [id number]   ORDERING CLINICIAN: ARNAV BURCIAGA   TECHNIQUE: Noncontrast CT images of the head with coronal and sagittal reconstructions. Axial noncontrast CT images of the cervical spine with coronal and sagittal reconstructed images.   FINDINGS: EXTRACRANIAL SOFT TISSUES: Unremarkable.   CALVARIUM: No depressed skull fracture. No destructive osseous lesion.   PARANASAL SINUSES/MASTOIDS: The visualized paranasal sinuses and mastoid air cells are aerated.   HEMORRHAGE: No acute intracranial hemorrhage.   BRAIN PARENCHYMA: Gray-white matter interfaces are preserved. No mass effect or midline shift. There are nonspecific scattered white matter hypodensities.   VENTRICLES and EXTRA-AXIAL SPACES: Parenchymal atrophy with prominence of the ventricles and cortical sulci.   OTHER FINDINGS: There are calcifications within the cavernous carotids   CERVICAL SPINE:   ALIGNMENT: No traumatic malalignment VERTEBRAE: No acute loss of vertebral body height. Bones are  demineralized DISC SPACE: Bony fusion across the C5/C6 disc space. SPINAL CANAL: Multilevel facet and uncovertebral arthropathy with varying degrees of neural foraminal stenosis. No severe central narrowing. PREVERTEBRAL SOFT TISSUES: No prevertebral soft tissue swelling. LUNG APICES: Imaged portion of the lung apices are within normal limits.   OTHER FINDINGS: None.          No acute intracranial hemorrhage, mass effect or midline shift.   Nonspecific scattered white matter hypodensities favored to represent sequela of small vessel ischemia. Cervical spondylosis without acute loss of vertebral body height or traumatic malalignment.     MACRO: None.   Signed by: Evan Finkelstein 5/6/2024 4:43 AM Dictation workstation:   [id number]             Assessment/Plan   Principal Problem:    Pneumonia due to other specified bacteria (Multi)  Fall at home  Admit to general medicine under Dr. Roca  Acute, observation, telemetry  Continue IV Ceftriaxone and IV Zithromax  Oxygen, titrate to maintain a saturation of 92% or greater  Pain control  CBC and CMP in AM  PT/OT     Chronic conditions  Atrial fibrillation on Xarelto, Monoclonal gammopathy of unknown significance, hypertension, hyperlipidemia, aortic valve stenosis, skin cancer, prostate cancer  Continue home medications as appropriate     DVT Prophylaxis  No chemical prophylaxis, patient takes Xarelto  SCDs           I spent 45 minutes in the professional and overall care of this patient.  Patient fully evaluated and plan as above     LONNIE Ruff-JAZZMINE                 Revision History       Patient fully evaluated on May 7. Vitals stable and not on supplemental O2. White count normal with hemoglobin 11.3 and hematocrit 33.4. Bicarb is low at 20 with normal kidney function. COVID swab was negative with pending strep pneumonia and legionella swabs. UA showed positive protein, ketones, urobilinogen, and occasional hyaline casts. Urine culture is pending. Per  radiologist report, chest XR performed on 5/6/24 showed possible pulmonary contusion with recommendations to have follow up imaging for resolution status. CT of head and right hip XR performed on 5/6/24. Patient currently on azithromycin and ceftriaxone. Will ordered CT thoracic spine due to midthoracic spine pain. Start Medrol dose pack. Continue present IV antibiotics. Repeat labs in AM. Continue to monitor and support with appropriate analgesia.     Patient fully evaluated on May 8. Vitals stable. White count normal with stable H/H, bicarb still low at 19, and AST 42. Patient is negative for strep pneumonia and legionella. CT thoracic spine is pending. PT cleared patient for discharge and will follow with PT as outpatient. Patient currently on methylprednisolone for management. Will switch him to Levaquin. Repeat labs in AM. Continue to support with appropriate analgesia.     Patient fully evaluated on May 9. Vitals stable. Labs show white count 12.6 with stable H/H, BUN 30, and normal Cr and GFR. Per radiologist report, CT of thoracic spine performed 5/8/24 showed possible malignancy of 11th rib, no fracture, and bilateral pleural effusion with recommendations for chest CT. Patient with a significant history of prostate and skin cancer, will order chest CT and CT of abdomen/pelvis. He is currently on levofloxacin and methylprednisolone. Repeat labs in the AM. Continue to monitor and support with appropriate analgesia.     Patient fully evaluated on May 10. Vitals stable. Labs show white count 11.8 with stable H/H. CMP stable with borderline low sodium at 132. PSA level still pending. CT chest, abdomen, and pelvis performed on 5/9/24. Please refer to the report for full details.  Hematology saw the patient today with recommendations to check myeloma markers, PSA level, and bone scan. Bone scan is ordered, pending results. While rounding, patient is accompanied by his wife and continues to complain of back and leg  pain. Patient's wife does not think he should go home yet and thinks he needs rehab before discharge. Will start high dose steroids. Upon discharge, when medically cleared, will send to skilled nursing. Repeat labs in AM. Continue monitor and support.     Principal Problem:    Pneumonia due to other specified bacteria (Multi)  Active Problems:    Fall, initial encounter  Patient fully evaluated on May 11 and still having significant pain today.  PSA is negative.  Further oncology workup is in progress with blood testing.  Patient agreeable to discharge to skilled nursing for further rehab and awaiting certification.  Recheck labs in AM.  Patient fully evaluated on May 12.  Still having significant arthralgias and myalgias.  Will start the patient on oral prednisone in the meantime.  Awaiting discharge to an ECF for further rehabilitation.            Ike Roca MD

## 2024-05-12 NOTE — CARE PLAN
The patient's goals for the shift include increase ambulation to improve strength.    The clinical goals for the shift include to remain free from falls

## 2024-05-12 NOTE — CARE PLAN
Problem: Safety  Goal: Patient will be injury free during hospitalization  Outcome: Progressing     Problem: Daily Care  Goal: Daily care needs are met  Outcome: Progressing   The patient's goals for the shift include remain safe    The clinical goals for the shift include to remain free from falls.

## 2024-05-13 NOTE — DISCHARGE SUMMARY
Discharge Diagnosis  Pneumonia due to other specified bacteria (Multi)    Issues Requiring Follow-Up  Patient fully evaluated on May 13. Vitals are stable. Labs show white count continues to remain elevated at 15.9 with stable H/H. CMP shows normal kidney function and low sodium at 128. PSA is normal with unremarkable Kappa/Lambda. Serum protein electrophoresis pending. Nephrology consulted for hyponatremia. Patient currently on prednisone. PT cleared patient for discharge. Will discharge patient.      Discharge Meds     Your medication list        START taking these medications        Instructions Last Dose Given Next Dose Due   acetaminophen 325 mg tablet  Commonly known as: Tylenol      Take 2 tablets (650 mg) by mouth every 4 hours if needed for mild pain (1 - 3).       levoFLOXacin 500 mg tablet  Commonly known as: Levaquin  Start taking on: May 14, 2024      Take 1 tablet (500 mg) by mouth once every 24 hours for 7 days.       lidocaine 4 % patch  Start taking on: May 14, 2024      Place 1 patch over 12 hours on the skin once daily. Remove & discard patch within 12 hours or as directed by MD.       mirtazapine 15 mg disintegrating tablet  Commonly known as: Remeron Sol-Tab      Take 1 tablet (15 mg) by mouth once daily at bedtime.       oxygen gas therapy  Commonly known as: O2      Inhale 2 L/min once every 24 hours.       predniSONE 20 mg tablet  Commonly known as: Deltasone  Start taking on: May 14, 2024      Take 1 tablet (20 mg) by mouth once daily.       urea 15 gram powder in packet oral powder  Commonly known as: Ure-Na      Take 30 g by mouth 2 times a day.              CONTINUE taking these medications        Instructions Last Dose Given Next Dose Due   amLODIPine 5 mg tablet  Commonly known as: Norvasc           Xarelto 20 mg tablet  Generic drug: rivaroxaban                     Where to Get Your Medications        These medications were sent to [facility] [address]  [address] [facility] [address]      Phone: [phone]   levoFLOXacin 500 mg tablet       Information about where to get these medications is not yet available    Ask your nurse or doctor about these medications  acetaminophen 325 mg tablet  lidocaine 4 % patch  mirtazapine 15 mg disintegrating tablet  oxygen gas therapy  predniSONE 20 mg tablet  urea 15 gram powder in packet oral powder         Test Results Pending At Discharge  Pending Labs       Order Current Status    Cortisol AM In process    Osmolality, urine In process    Serum Protein Electrophoresis + Immunofixation In process    Serum Protein Electrophoresis + Immunofixation In process            Hospital Course         Ike Roca MD   Physician  Internal Medicine     Progress Notes      Signed     Date of Service: 5/12/2024  3:15 PM     Signed       Expand All Collapse All    Rodo Fam is a 89 y.o. male on day 5 of admission presenting with Pneumonia due to other specified bacteria (Multi).           Subjective []Expand by Default              Objective   Last Recorded Vitals  /70 (BP Location: Left arm, Patient Position: Sitting)   Pulse 80   Temp 36 °C (96.8 °F) (Temporal)   Resp 18   Wt 81.6 kg (180 lb)   SpO2 92%   Intake/Output last 3 Shifts:     Intake/Output Summary (Last 24 hours) at 5/12/2024 1515  Last data filed at 5/12/2024 0542      Gross per 24 hour   Intake 100 ml   Output 675 ml   Net -575 ml            Admission Weight  Weight: 81.6 kg (180 lb) (05/06/24 0800)     Daily Weight  05/06/24 : 81.6 kg (180 lb)     Image Results  XR skeletal survey complete  Narrative: Interpreted By:  Bri Kim,   STUDY:  XR SKELETAL SURVEY COMPLETE 5/10/2024 7:09 pm      INDICATION:  Signs/Symptoms:Lytic lesion involving left 11th ribs      COMPARISON:  None available.      ACCESSION NUMBER(S):  [id number]      ORDERING CLINICIAN:  IKE ROCA      TECHNIQUE:  Skeletal survey is performed.      FINDINGS:  Osteolytic bony  destruction of the left 11th rib posteriorly is seen.  No additional osteolytic or osteoblastic bony lesions are identified.  There is no endosteal scalloping seen within the long bones. No  pathologic compression fractures of the spine are identified.      There is postoperative change from bipolar right hip arthroplasty.      Bilateral perihilar infiltrates are noted.      Impression: Solitary osteolytic lesion destroying posterior left 11th rib. 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 The possibility of a  solitary osteolytic bony metastasis is not excluded however.      Signed by: Bri Kim 5/11/2024 2:01 PM  Dictation workstation:   [id number]        Physical Exam     Relevant Results                       Assessment/Plan         Ike Roca MD   Physician  Internal Medicine     H&P      Addendum     Date of Service: 5/6/2024  8:11 AM      Addendum        Expand All Collapse All    History Of Present Illness  Rodo Fam is a 89 y.o. male  Rodo Fam is an 89 year old male who presents to the emergency department from home after a fall at home. He reports that he got up to use the bathroom, lost his balance and fell onto his right side. Upon arrival to the emergency department, his oxygen saturation was 93% on room air.  Patient denies feeling dizzy before the fall.  He denies hitting his head.  He does complain of some pain on his right side, mainly his right hip.  He also complains of back pain and a productive cough for 3 weeks.  He denies any fevers.  He denies any shortness of breath.  No problems moving his bowel or bladder.  He has a past medical history of atrial fibrillation and he is on Xarelto, monoclonal gammopathy of unknown significance, hypertension, hyperlipidemia, aortic valve stenosis, skin cancer and prostate cancer.     ED Course  VS reviewed  Labs reviewed, results below   EKG unavailable for review  CXR reviewed, results below  CT head and C-spine reviewed, results  below  Right hip and right pelvis x-ray reviewed, results below  IV Ceftriaxone  IV Zithromax     Past Medical History  Atrial fibrillation on Xarelto, Monoclonal gammopathy of unknown significance, hypertension, hyperlipidemia, aortic valve stenosis, skin cancer, prostate cancer     Surgical History  Hip replacement, prostatectomy, skin cancer excision     Social History  Nonsmoker, denies alcohol or drug use. Lives at home with his wife and does not use any ambulatory aids     Family History  Family History   No family history on file.         Allergies  Penicillins     Review of Systems   Constitutional: Negative.    HENT: Negative.     Eyes: Negative.    Respiratory:  Positive for cough.    Cardiovascular: Negative.    Gastrointestinal: Negative.    Endocrine: Negative.    Genitourinary: Negative.    Musculoskeletal:  Positive for back pain and myalgias.   Skin: Negative.    Allergic/Immunologic: Negative.    Neurological: Negative.    Hematological: Negative.    Psychiatric/Behavioral: Negative.           Physical Exam  Constitutional:       General: He is not in acute distress.     Appearance: Normal appearance.   HENT:      Head: Normocephalic.      Nose: Nose normal.      Mouth/Throat:      Mouth: Mucous membranes are moist.      Pharynx: Oropharynx is clear.   Eyes:      Extraocular Movements: Extraocular movements intact.      Pupils: Pupils are equal, round, and reactive to light.   Cardiovascular:      Rate and Rhythm: Normal rate and regular rhythm.      Pulses: Normal pulses.   Pulmonary:      Effort: Pulmonary effort is normal.      Breath sounds: Normal breath sounds.   Abdominal:      General: Bowel sounds are normal.      Palpations: Abdomen is soft.   Musculoskeletal:         General: Tenderness present. Normal range of motion.      Cervical back: Normal range of motion.      Right lower leg: No edema.      Left lower leg: No edema.      Comments: Right hip tenderness   Skin:     General: Skin is  "warm and dry.      Capillary Refill: Capillary refill takes less than 2 seconds.   Neurological:      General: No focal deficit present.      Mental Status: He is alert and oriented to person, place, and time.   Psychiatric:         Mood and Affect: Mood normal.         Behavior: Behavior normal.            Last Recorded Vitals  Blood pressure 145/64, pulse 82, resp. rate 18, height 1.778 m (5' 10\"), weight 81.6 kg (180 lb), SpO2 94%.     Relevant Results            Results for orders placed or performed during the hospital encounter of 05/06/24 (from the past 24 hour(s))   CBC and Auto Differential   Result Value Ref Range     WBC 10.9 4.4 - 11.3 x10*3/uL     nRBC 0.2 (H) 0.0 - 0.0 /100 WBCs     RBC 3.61 (L) 4.50 - 5.90 x10*6/uL     Hemoglobin 11.4 (L) 13.5 - 17.5 g/dL     Hematocrit 34.7 (L) 41.0 - 52.0 %     MCV 96 80 - 100 fL     MCH 31.6 26.0 - 34.0 pg     MCHC 32.9 32.0 - 36.0 g/dL     RDW 13.5 11.5 - 14.5 %     Platelets 208 150 - 450 x10*3/uL     Neutrophils % 72.8 40.0 - 80.0 %     Immature Granulocytes %, Automated 4.5 (H) 0.0 - 0.9 %     Lymphocytes % 13.2 13.0 - 44.0 %     Monocytes % 8.6 2.0 - 10.0 %     Eosinophils % 0.4 0.0 - 6.0 %     Basophils % 0.5 0.0 - 2.0 %     Neutrophils Absolute 7.92 (H) 1.60 - 5.50 x10*3/uL     Immature Granulocytes Absolute, Automated 0.49 0.00 - 0.50 x10*3/uL     Lymphocytes Absolute 1.43 0.80 - 3.00 x10*3/uL     Monocytes Absolute 0.94 (H) 0.05 - 0.80 x10*3/uL     Eosinophils Absolute 0.04 0.00 - 0.40 x10*3/uL     Basophils Absolute 0.05 0.00 - 0.10 x10*3/uL   Comprehensive Metabolic Panel   Result Value Ref Range     Glucose 138 (H) 74 - 99 mg/dL     Sodium 140 136 - 145 mmol/L     Potassium 4.0 3.5 - 5.3 mmol/L     Chloride 106 98 - 107 mmol/L     Bicarbonate 18 (L) 21 - 32 mmol/L     Anion Gap 20 10 - 20 mmol/L     Urea Nitrogen 21 6 - 23 mg/dL     Creatinine 0.93 0.50 - 1.30 mg/dL     eGFR 78 >60 mL/min/1.73m*2     Calcium 8.6 8.6 - 10.3 mg/dL     Albumin 3.6 3.4 - " 5.0 g/dL     Alkaline Phosphatase 100 33 - 136 U/L     Total Protein 6.8 6.4 - 8.2 g/dL     AST 38 9 - 39 U/L     Bilirubin, Total 0.8 0.0 - 1.2 mg/dL     ALT 16 10 - 52 U/L   Protime-INR   Result Value Ref Range     Protime 20.9 (H) 9.8 - 12.8 seconds     INR 1.8 (H) 0.9 - 1.1   Type And Screen   Result Value Ref Range     ABO TYPE O       Rh TYPE POS       ANTIBODY SCREEN NEG     Sars-CoV-2 PCR   Result Value Ref Range     Coronavirus 2019, PCR Not Detected Not Detected   Urinalysis with Reflex Culture and Microscopic   Result Value Ref Range     Color, Urine Yellow Straw, Yellow     Appearance, Urine Clear Clear     Specific Gravity, Urine 1.024 1.005 - 1.035     pH, Urine 5.0 5.0, 5.5, 6.0, 6.5, 7.0, 7.5, 8.0     Protein, Urine 30 (1+) (N) NEGATIVE mg/dL     Glucose, Urine NEGATIVE NEGATIVE mg/dL     Blood, Urine NEGATIVE NEGATIVE     Ketones, Urine 20 (1+) (A) NEGATIVE mg/dL     Bilirubin, Urine NEGATIVE NEGATIVE     Urobilinogen, Urine 2.0 (N) <2.0 mg/dL     Nitrite, Urine NEGATIVE NEGATIVE     Leukocyte Esterase, Urine NEGATIVE NEGATIVE   Urinalysis Microscopic   Result Value Ref Range     WBC, Urine 1-5 1-5, NONE /HPF     RBC, Urine NONE NONE, 1-2, 3-5 /HPF     Mucus, Urine 1+ Reference range not established. /LPF     Hyaline Casts, Urine OCCASIONAL (A) NONE /LPF   XR hip right with pelvis when performed 2 or 3 views     Result Date: 5/6/2024  Interpreted By:  Finkelstein, Evan, STUDY: XR HIP RIGHT WITH PELVIS WHEN PERFORMED 2 OR 3 VIEWS;  5/6/2024 4:51 am three views right hip. AP view of the pelvis   INDICATION: Signs/Symptoms:fall, right lateral hip pain.   COMPARISON: Pelvic radiograph 04/30/2017   ACCESSION NUMBER(S): [id number]   ORDERING CLINICIAN: ARNAV BURCIAGA   FINDINGS: Right hip arthroplasty hardware is present and appears intact. No surrounding lucency or periprosthetic fracture. Bones are demineralized. There are vascular calcifications. No acute displaced fracture or dislocation of the pelvis.  No widening of the pubic symphysis. Degenerative changes in the visualized lower lumbosacral spine.        Right hip arthroplasty hardware present without surrounding lucency or periprosthetic fracture. No acute displaced fracture or dislocation of the pelvis.     MACRO: None.   Signed by: Evan Finkelstein 5/6/2024 6:00 AM Dictation workstation:   [id number]     XR chest 1 view     Result Date: 5/6/2024  Interpreted By:  Finkelstein, Evan, STUDY: XR CHEST 1 VIEW;  5/6/2024 4:31 am   INDICATION: Signs/Symptoms:Trauma.   COMPARISON: Chest radiograph 04/30/2017   ACCESSION NUMBER(S): [id number]   ORDERING CLINICIAN: ARNAV BURCIAGA   FINDINGS:     CARDIOMEDIASTINAL SILHOUETTE: Enlarged cardiac silhouette, similar compared to prior imaging. Calcifications overlie the aortic arch.   LUNGS: Patchy airspace opacities overlying the upper aspect of the lungs bilaterally. No pneumothorax   ABDOMEN: No remarkable upper abdominal findings.   BONES: There are irregularities along the lateral aspect of multiple left-sided ribs, which appear similar compared to prior imaging.        Patchy airspace opacities overlying the upper aspect of the lungs bilaterally. Findings may represent pulmonary contusions in the setting of trauma. Recommend follow-up to resolution. Irregularities along the lateral aspect of multiple left-sided ribs, which overall appears similar compared to prior imaging.   MACRO: None.   Signed by: Evan Finkelstein 5/6/2024 5:58 AM Dictation workstation:   [id number]     CT head W O contrast trauma protocol     Result Date: 5/6/2024  Interpreted By:  Finkelstein, Evan, STUDY: CT HEAD W/O CONTRAST TRAUMA PROTOCOL; CT CERVICAL SPINE WO IV CONTRAST;  5/6/2024 4:22 am   INDICATION: Signs/Symptoms:fall on thinners.   COMPARISON: CT brain 04/30/2017   ACCESSION NUMBER(S): [id number]; [id number]   ORDERING CLINICIAN: ARNAV BURCIAGA   TECHNIQUE: Noncontrast CT images of the head with coronal and sagittal reconstructions.  Axial noncontrast CT images of the cervical spine with coronal and sagittal reconstructed images.   FINDINGS: EXTRACRANIAL SOFT TISSUES: Unremarkable.   CALVARIUM: No depressed skull fracture. No destructive osseous lesion.   PARANASAL SINUSES/MASTOIDS: The visualized paranasal sinuses and mastoid air cells are aerated.   HEMORRHAGE: No acute intracranial hemorrhage.   BRAIN PARENCHYMA: Gray-white matter interfaces are preserved. No mass effect or midline shift. There are nonspecific scattered white matter hypodensities.   VENTRICLES and EXTRA-AXIAL SPACES: Parenchymal atrophy with prominence of the ventricles and cortical sulci.   OTHER FINDINGS: There are calcifications within the cavernous carotids   CERVICAL SPINE:   ALIGNMENT: No traumatic malalignment VERTEBRAE: No acute loss of vertebral body height. Bones are demineralized DISC SPACE: Bony fusion across the C5/C6 disc space. SPINAL CANAL: Multilevel facet and uncovertebral arthropathy with varying degrees of neural foraminal stenosis. No severe central narrowing. PREVERTEBRAL SOFT TISSUES: No prevertebral soft tissue swelling. LUNG APICES: Imaged portion of the lung apices are within normal limits.   OTHER FINDINGS: None.          No acute intracranial hemorrhage, mass effect or midline shift.   Nonspecific scattered white matter hypodensities favored to represent sequela of small vessel ischemia. Cervical spondylosis without acute loss of vertebral body height or traumatic malalignment.     MACRO: None.   Signed by: Evan Finkelstein 5/6/2024 4:43 AM Dictation workstation:   [id number]     CT cervical spine wo IV contrast     Result Date: 5/6/2024  Interpreted By:  Finkelstein, Evan, STUDY: CT HEAD W/O CONTRAST TRAUMA PROTOCOL; CT CERVICAL SPINE WO IV CONTRAST;  5/6/2024 4:22 am   INDICATION: Signs/Symptoms:fall on thinners.   COMPARISON: CT brain 04/30/2017   ACCESSION NUMBER(S): [id number]; [id number]   ORDERING CLINICIAN: ARNAV BURCIAGA   TECHNIQUE:  Noncontrast CT images of the head with coronal and sagittal reconstructions. Axial noncontrast CT images of the cervical spine with coronal and sagittal reconstructed images.   FINDINGS: EXTRACRANIAL SOFT TISSUES: Unremarkable.   CALVARIUM: No depressed skull fracture. No destructive osseous lesion.   PARANASAL SINUSES/MASTOIDS: The visualized paranasal sinuses and mastoid air cells are aerated.   HEMORRHAGE: No acute intracranial hemorrhage.   BRAIN PARENCHYMA: Gray-white matter interfaces are preserved. No mass effect or midline shift. There are nonspecific scattered white matter hypodensities.   VENTRICLES and EXTRA-AXIAL SPACES: Parenchymal atrophy with prominence of the ventricles and cortical sulci.   OTHER FINDINGS: There are calcifications within the cavernous carotids   CERVICAL SPINE:   ALIGNMENT: No traumatic malalignment VERTEBRAE: No acute loss of vertebral body height. Bones are demineralized DISC SPACE: Bony fusion across the C5/C6 disc space. SPINAL CANAL: Multilevel facet and uncovertebral arthropathy with varying degrees of neural foraminal stenosis. No severe central narrowing. PREVERTEBRAL SOFT TISSUES: No prevertebral soft tissue swelling. LUNG APICES: Imaged portion of the lung apices are within normal limits.   OTHER FINDINGS: None.          No acute intracranial hemorrhage, mass effect or midline shift.   Nonspecific scattered white matter hypodensities favored to represent sequela of small vessel ischemia. Cervical spondylosis without acute loss of vertebral body height or traumatic malalignment.     MACRO: None.   Signed by: Evan Finkelstein 5/6/2024 4:43 AM Dictation workstation:   [id number]             Assessment/Plan   Principal Problem:    Pneumonia due to other specified bacteria (Multi)  Fall at home  Admit to general medicine under Dr. Roca  Acute, observation, telemetry  Continue IV Ceftriaxone and IV Zithromax  Oxygen, titrate to maintain a saturation of 92% or greater  Pain  control  CBC and CMP in AM  PT/OT     Chronic conditions  Atrial fibrillation on Xarelto, Monoclonal gammopathy of unknown significance, hypertension, hyperlipidemia, aortic valve stenosis, skin cancer, prostate cancer  Continue home medications as appropriate     DVT Prophylaxis  No chemical prophylaxis, patient takes Xarelto  SCDs           I spent 45 minutes in the professional and overall care of this patient.  Patient fully evaluated and plan as above     Ashley LOYD Chad, APRN-CNP                  Revision History       Patient fully evaluated on May 7. Vitals stable and not on supplemental O2. White count normal with hemoglobin 11.3 and hematocrit 33.4. Bicarb is low at 20 with normal kidney function. COVID swab was negative with pending strep pneumonia and legionella swabs. UA showed positive protein, ketones, urobilinogen, and occasional hyaline casts. Urine culture is pending. Per radiologist report, chest XR performed on 5/6/24 showed possible pulmonary contusion with recommendations to have follow up imaging for resolution status. CT of head and right hip XR performed on 5/6/24. Patient currently on azithromycin and ceftriaxone. Will ordered CT thoracic spine due to midthoracic spine pain. Start Medrol dose pack. Continue present IV antibiotics. Repeat labs in AM. Continue to monitor and support with appropriate analgesia.      Patient fully evaluated on May 8. Vitals stable. White count normal with stable H/H, bicarb still low at 19, and AST 42. Patient is negative for strep pneumonia and legionella. CT thoracic spine is pending. PT cleared patient for discharge and will follow with PT as outpatient. Patient currently on methylprednisolone for management. Will switch him to Levaquin. Repeat labs in AM. Continue to support with appropriate analgesia.      Patient fully evaluated on May 9. Vitals stable. Labs show white count 12.6 with stable H/H, BUN 30, and normal Cr and GFR. Per radiologist report, CT  of thoracic spine performed 5/8/24 showed possible malignancy of 11th rib, no fracture, and bilateral pleural effusion with recommendations for chest CT. Patient with a significant history of prostate and skin cancer, will order chest CT and CT of abdomen/pelvis. He is currently on levofloxacin and methylprednisolone. Repeat labs in the AM. Continue to monitor and support with appropriate analgesia.      Patient fully evaluated on May 10. Vitals stable. Labs show white count 11.8 with stable H/H. CMP stable with borderline low sodium at 132. PSA level still pending. CT chest, abdomen, and pelvis performed on 5/9/24. Please refer to the report for full details.  Hematology saw the patient today with recommendations to check myeloma markers, PSA level, and bone scan. Bone scan is ordered, pending results. While rounding, patient is accompanied by his wife and continues to complain of back and leg pain. Patient's wife does not think he should go home yet and thinks he needs rehab before discharge. Will start high dose steroids. Upon discharge, when medically cleared, will send to skilled nursing. Repeat labs in AM. Continue monitor and support.      Principal Problem:    Pneumonia due to other specified bacteria (Multi)  Active Problems:    Fall, initial encounter  Patient fully evaluated on May 11 and still having significant pain today.  PSA is negative.  Further oncology workup is in progress with blood testing.  Patient agreeable to discharge to skilled nursing for further rehab and awaiting certification.  Recheck labs in AM.  Patient fully evaluated on May 12.  Still having significant arthralgias and myalgias.  Will start the patient on oral prednisone in the meantime.  Awaiting discharge to an ECF for further rehabilitation.              Ike Roca MD                          Pertinent Physical Exam At Time of Discharge  Physical Exam    Outpatient Follow-Up  No future appointments.      Ike Roca,  MD

## 2024-05-13 NOTE — CARE PLAN
The patient's goals for the shift include remain safe    The clinical goals for the shift include patient to remain safe and free of injury    Over the shift, the patient did not make progress toward the following goals. Recommendations to address these barriers include continue to monitor.

## 2024-05-13 NOTE — PROGRESS NOTES
"Patient ID: : Rodo Fam              HEMATOLOGY ONCOLOGY PROBLEMS:  1.  Lytic bony lesion involving left 11th rib  2.  Previous history of MGUS    INTERVAL HISTORY:  Patient denies any new complaints other than issues with chronic weakness and fatigue. No history of nausea, vomiting, fever, night sweats, diarrhea, rash, anorexia or weight loss.     PAST MEDICAL / SOCIAL & FAMILY HISTORY:  Please refer to the details as outlined in the initial consult note.    REVIEW OF SYSTEMS:   Pertinent finding as per the history above.  All other systems have been reviewed and generally negative and noncontributory.    VITAL SIGNS  BSA: 2.01 meters squared  /68   Pulse 93   Temp 35.9 °C (96.6 °F) (Temporal)   Resp 18   Ht 1.778 m (5' 10\")   Wt 81.6 kg (180 lb)   SpO2 92%   BMI 25.83 kg/m²     PHYSICAL EXAMINATION:  Detailed physical examination not done.    LAB DATA:  CBC from today shows a white cell count of 15.9 with hemoglobin of 11.3 and platelets of 205.  Metabolic profile is unremarkable other than sodium of 128.  Creatinine 0.6 and calcium is 9.0.    Free light chain assays and PSA has been normal.    RADIOLOGICAL DATA:  Skeletal survey from 5/10/2024 showed osteolytic bony destruction of the left 11th rib without any other bony abnormality.    ASSESSMENT / PLAN:  1.  Solitary isolated left 11th rib lytic lesion.  In view of previous history of MGUS there is a possibility of plasmacytoma etc.  There is also previous history of prostate cancer but PSA is unremarkable and the radiological findings are more suggestive of osteolytic lesion rather than osteoblastic.  SPEP results are still pending although free light chain assays are normal.  No significant anemia, renal abnormality or concern regarding hypercalcemia.  Most likely he might end up needing a biopsy of the 11th rib as an outpatient.  He has long been followed at [facility] hematology clinic for MGUS and I strongly advised him to follow-up " with them again for further workup.    This note has been transcribed using Dragon voice recognition system and there is a possibility of unintentional typing misprints.       Blaine Slater MD

## 2024-05-13 NOTE — PROGRESS NOTES
Physical Therapy    Physical Therapy Treatment    Patient Name: Rodo Fam  MRN: [id number]  Today's Date: 5/13/2024  Time Calculation  Start Time: 0931  Stop Time: 1001  Time Calculation (min): 30 min    Assessment/Plan         PT Plan  Treatment/Interventions: Bed mobility, Gait training, Transfer training, Stair training, Balance training, Therapeutic exercise, Therapeutic activity  PT Plan: Skilled PT  PT Frequency: 3 times per week  PT Discharge Recommendations: Low intensity level of continued care (out patient therapy to address back pain)  Equipment Recommended upon Discharge:  (possibly ww pending progress during acute los)  PT - OK to Discharge: Yes (to next level of care when cleared by medical team)      General Visit Information:   PT  Visit  PT Received On: 05/13/24       Subjective             Objective                      Treatments:  Therapeutic Exercise  Therapeutic Exercise Performed:  (ble arom exs x 15 reps)         Ambulation/Gait Training  Ambulation/Gait Training Performed:  (ambulated 80 feet using fixed wheeled walker cga   decreased endurance)  Transfers  Transfer:  (sit to stand cga)         Outcome Measures:  [facility] Basic Mobility  Turning from your back to your side while in a flat bed without using bedrails: None  Moving from lying on your back to sitting on the side of a flat bed without using bedrails: None  Moving to and from bed to chair (including a wheelchair): A little  Standing up from a chair using your arms (e.g. wheelchair or bedside chair): A little  To walk in hospital room: A little  Climbing 3-5 steps with railing: A lot  Basic Mobility - Total Score: 19    Education Documentation  Precautions, taught by Luz Marina Bennett PTA at 5/13/2024  1:25 PM.  Learner: Patient  Readiness: Acceptance  Method: Demonstration  Response: Demonstrated Understanding    ADL Training, taught by Luz Marina Bennett PTA at 5/13/2024  1:25 PM.  Learner: Patient  Readiness: Acceptance  Method:  Demonstration  Response: Demonstrated Understanding    Body Mechanics, taught by Luz Marina Bennett PTA at 5/13/2024  1:25 PM.  Learner: Patient  Readiness: Acceptance  Method: Demonstration  Response: Demonstrated Understanding    Mobility Training, taught by Luz Marina Bennett PTA at 5/13/2024  1:25 PM.  Learner: Patient  Readiness: Acceptance  Method: Demonstration  Response: Demonstrated Understanding    Education Comments  No comments found.               Encounter Problems       Encounter Problems (Active)       PT Problem       STG - Pt will transition supine <> sitting with modified independence using log rolling technique  (Progressing)       Start:  05/06/24    Expected End:  05/20/24            STG - Pt will transfer STS with modified independence   (Progressing)       Start:  05/06/24    Expected End:  05/20/24            STG - Pt will amb >=[id number] appropriate assistive device with modified independence  (Progressing)       Start:  05/06/24    Expected End:  05/20/24            STG -  Pt will navigate >=3 stairs using rail with sba  (Progressing)       Start:  05/06/24    Expected End:  05/20/24

## 2024-05-14 NOTE — PROGRESS NOTES
Rodo Fam is a 89 y.o. male on day 7 of admission presenting with Pneumonia due to other specified bacteria (Multi).      Subjective          Objective     Last Recorded Vitals  /66   Pulse 81   Temp 36.4 °C (97.5 °F)   Resp 16   Wt 81.6 kg (180 lb)   SpO2 93%   Intake/Output last 3 Shifts:    Intake/Output Summary (Last 24 hours) at 5/14/2024 0858  Last data filed at 5/13/2024 1428  Gross per 24 hour   Intake --   Output 200 ml   Net -200 ml         Admission Weight  Weight: 81.6 kg (180 lb) (05/06/24 0800)    Daily Weight  05/06/24 : 81.6 kg (180 lb)    Image Results  XR skeletal survey complete  Narrative: Interpreted By:  Bri Kim,   STUDY:  XR SKELETAL SURVEY COMPLETE 5/10/2024 7:09 pm      INDICATION:  Signs/Symptoms:Lytic lesion involving left 11th ribs      COMPARISON:  None available.      ACCESSION NUMBER(S):  [id number]      ORDERING CLINICIAN:  ISAK KAMARA      TECHNIQUE:  Skeletal survey is performed.      FINDINGS:  Osteolytic bony destruction of the left 11th rib posteriorly is seen.  No additional osteolytic or osteoblastic bony lesions are identified.  There is no endosteal scalloping seen within the long bones. No  pathologic compression fractures of the spine are identified.      There is postoperative change from bipolar right hip arthroplasty.      Bilateral perihilar infiltrates are noted.      Impression: Solitary osteolytic lesion destroying posterior left 11th rib. 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 The possibility of a  solitary osteolytic bony metastasis is not excluded however.      Signed by: Bri Kim 5/11/2024 2:01 PM  Dictation workstation:   [id number]      Physical Exam    Relevant Results  {If you would like to pull in Medications, type .meds     If you would like to pull in Lab results for the last 24 hours, type .[id number]    If you would like to pull in Imaging results, type .imgrslt :99}      {Link to Stroke Scoring tools - Link :99}        Assessment/Plan            Ike Roca MD   Physician  Internal Medicine     H&P      Addendum     Date of Service: 5/6/2024  8:11 AM     Addendum       Expand All Collapse All    History Of Present Illness  Rodo Fam is a 89 y.o. male  Rodo Fam is an 89 year old male who presents to the emergency department from home after a fall at home. He reports that he got up to use the bathroom, lost his balance and fell onto his right side. Upon arrival to the emergency department, his oxygen saturation was 93% on room air.  Patient denies feeling dizzy before the fall.  He denies hitting his head.  He does complain of some pain on his right side, mainly his right hip.  He also complains of back pain and a productive cough for 3 weeks.  He denies any fevers.  He denies any shortness of breath.  No problems moving his bowel or bladder.  He has a past medical history of atrial fibrillation and he is on Xarelto, monoclonal gammopathy of unknown significance, hypertension, hyperlipidemia, aortic valve stenosis, skin cancer and prostate cancer.     ED Course  VS reviewed  Labs reviewed, results below   EKG unavailable for review  CXR reviewed, results below  CT head and C-spine reviewed, results below  Right hip and right pelvis x-ray reviewed, results below  IV Ceftriaxone  IV Zithromax     Past Medical History  Atrial fibrillation on Xarelto, Monoclonal gammopathy of unknown significance, hypertension, hyperlipidemia, aortic valve stenosis, skin cancer, prostate cancer     Surgical History  Hip replacement, prostatectomy, skin cancer excision     Social History  Nonsmoker, denies alcohol or drug use. Lives at home with his wife and does not use any ambulatory aids     Family History  Family History   No family history on file.        Allergies  Penicillins     Review of Systems   Constitutional: Negative.    HENT: Negative.     Eyes: Negative.    Respiratory:  Positive for cough.    Cardiovascular: Negative.   "  Gastrointestinal: Negative.    Endocrine: Negative.    Genitourinary: Negative.    Musculoskeletal:  Positive for back pain and myalgias.   Skin: Negative.    Allergic/Immunologic: Negative.    Neurological: Negative.    Hematological: Negative.    Psychiatric/Behavioral: Negative.           Physical Exam  Constitutional:       General: He is not in acute distress.     Appearance: Normal appearance.   HENT:      Head: Normocephalic.      Nose: Nose normal.      Mouth/Throat:      Mouth: Mucous membranes are moist.      Pharynx: Oropharynx is clear.   Eyes:      Extraocular Movements: Extraocular movements intact.      Pupils: Pupils are equal, round, and reactive to light.   Cardiovascular:      Rate and Rhythm: Normal rate and regular rhythm.      Pulses: Normal pulses.   Pulmonary:      Effort: Pulmonary effort is normal.      Breath sounds: Normal breath sounds.   Abdominal:      General: Bowel sounds are normal.      Palpations: Abdomen is soft.   Musculoskeletal:         General: Tenderness present. Normal range of motion.      Cervical back: Normal range of motion.      Right lower leg: No edema.      Left lower leg: No edema.      Comments: Right hip tenderness   Skin:     General: Skin is warm and dry.      Capillary Refill: Capillary refill takes less than 2 seconds.   Neurological:      General: No focal deficit present.      Mental Status: He is alert and oriented to person, place, and time.   Psychiatric:         Mood and Affect: Mood normal.         Behavior: Behavior normal.            Last Recorded Vitals  Blood pressure 145/64, pulse 82, resp. rate 18, height 1.778 m (5' 10\"), weight 81.6 kg (180 lb), SpO2 94%.     Relevant Results        Results for orders placed or performed during the hospital encounter of 05/06/24 (from the past 24 hour(s))   CBC and Auto Differential   Result Value Ref Range     WBC 10.9 4.4 - 11.3 x10*3/uL     nRBC 0.2 (H) 0.0 - 0.0 /100 WBCs     RBC 3.61 (L) 4.50 - 5.90 " x10*6/uL     Hemoglobin 11.4 (L) 13.5 - 17.5 g/dL     Hematocrit 34.7 (L) 41.0 - 52.0 %     MCV 96 80 - 100 fL     MCH 31.6 26.0 - 34.0 pg     MCHC 32.9 32.0 - 36.0 g/dL     RDW 13.5 11.5 - 14.5 %     Platelets 208 150 - 450 x10*3/uL     Neutrophils % 72.8 40.0 - 80.0 %     Immature Granulocytes %, Automated 4.5 (H) 0.0 - 0.9 %     Lymphocytes % 13.2 13.0 - 44.0 %     Monocytes % 8.6 2.0 - 10.0 %     Eosinophils % 0.4 0.0 - 6.0 %     Basophils % 0.5 0.0 - 2.0 %     Neutrophils Absolute 7.92 (H) 1.60 - 5.50 x10*3/uL     Immature Granulocytes Absolute, Automated 0.49 0.00 - 0.50 x10*3/uL     Lymphocytes Absolute 1.43 0.80 - 3.00 x10*3/uL     Monocytes Absolute 0.94 (H) 0.05 - 0.80 x10*3/uL     Eosinophils Absolute 0.04 0.00 - 0.40 x10*3/uL     Basophils Absolute 0.05 0.00 - 0.10 x10*3/uL   Comprehensive Metabolic Panel   Result Value Ref Range     Glucose 138 (H) 74 - 99 mg/dL     Sodium 140 136 - 145 mmol/L     Potassium 4.0 3.5 - 5.3 mmol/L     Chloride 106 98 - 107 mmol/L     Bicarbonate 18 (L) 21 - 32 mmol/L     Anion Gap 20 10 - 20 mmol/L     Urea Nitrogen 21 6 - 23 mg/dL     Creatinine 0.93 0.50 - 1.30 mg/dL     eGFR 78 >60 mL/min/1.73m*2     Calcium 8.6 8.6 - 10.3 mg/dL     Albumin 3.6 3.4 - 5.0 g/dL     Alkaline Phosphatase 100 33 - 136 U/L     Total Protein 6.8 6.4 - 8.2 g/dL     AST 38 9 - 39 U/L     Bilirubin, Total 0.8 0.0 - 1.2 mg/dL     ALT 16 10 - 52 U/L   Protime-INR   Result Value Ref Range     Protime 20.9 (H) 9.8 - 12.8 seconds     INR 1.8 (H) 0.9 - 1.1   Type And Screen   Result Value Ref Range     ABO TYPE O       Rh TYPE POS       ANTIBODY SCREEN NEG     Sars-CoV-2 PCR   Result Value Ref Range     Coronavirus 2019, PCR Not Detected Not Detected   Urinalysis with Reflex Culture and Microscopic   Result Value Ref Range     Color, Urine Yellow Straw, Yellow     Appearance, Urine Clear Clear     Specific Gravity, Urine 1.024 1.005 - 1.035     pH, Urine 5.0 5.0, 5.5, 6.0, 6.5, 7.0, 7.5, 8.0      Protein, Urine 30 (1+) (N) NEGATIVE mg/dL     Glucose, Urine NEGATIVE NEGATIVE mg/dL     Blood, Urine NEGATIVE NEGATIVE     Ketones, Urine 20 (1+) (A) NEGATIVE mg/dL     Bilirubin, Urine NEGATIVE NEGATIVE     Urobilinogen, Urine 2.0 (N) <2.0 mg/dL     Nitrite, Urine NEGATIVE NEGATIVE     Leukocyte Esterase, Urine NEGATIVE NEGATIVE   Urinalysis Microscopic   Result Value Ref Range     WBC, Urine 1-5 1-5, NONE /HPF     RBC, Urine NONE NONE, 1-2, 3-5 /HPF     Mucus, Urine 1+ Reference range not established. /LPF     Hyaline Casts, Urine OCCASIONAL (A) NONE /LPF   XR hip right with pelvis when performed 2 or 3 views     Result Date: 5/6/2024  Interpreted By:  Finkelstein, Evan, STUDY: XR HIP RIGHT WITH PELVIS WHEN PERFORMED 2 OR 3 VIEWS;  5/6/2024 4:51 am three views right hip. AP view of the pelvis   INDICATION: Signs/Symptoms:fall, right lateral hip pain.   COMPARISON: Pelvic radiograph 04/30/2017   ACCESSION NUMBER(S): [id number]   ORDERING CLINICIAN: ARNAV BURCIAGA   FINDINGS: Right hip arthroplasty hardware is present and appears intact. No surrounding lucency or periprosthetic fracture. Bones are demineralized. There are vascular calcifications. No acute displaced fracture or dislocation of the pelvis. No widening of the pubic symphysis. Degenerative changes in the visualized lower lumbosacral spine.        Right hip arthroplasty hardware present without surrounding lucency or periprosthetic fracture. No acute displaced fracture or dislocation of the pelvis.     MACRO: None.   Signed by: Evan Finkelstein 5/6/2024 6:00 AM Dictation workstation:   [id number]     XR chest 1 view     Result Date: 5/6/2024  Interpreted By:  Finkelstein, Evan, STUDY: XR CHEST 1 VIEW;  5/6/2024 4:31 am   INDICATION: Signs/Symptoms:Trauma.   COMPARISON: Chest radiograph 04/30/2017   ACCESSION NUMBER(S): [id number]   ORDERING CLINICIAN: ARNAV BURCIAGA   FINDINGS:     CARDIOMEDIASTINAL SILHOUETTE: Enlarged cardiac silhouette, similar compared  to prior imaging. Calcifications overlie the aortic arch.   LUNGS: Patchy airspace opacities overlying the upper aspect of the lungs bilaterally. No pneumothorax   ABDOMEN: No remarkable upper abdominal findings.   BONES: There are irregularities along the lateral aspect of multiple left-sided ribs, which appear similar compared to prior imaging.        Patchy airspace opacities overlying the upper aspect of the lungs bilaterally. Findings may represent pulmonary contusions in the setting of trauma. Recommend follow-up to resolution. Irregularities along the lateral aspect of multiple left-sided ribs, which overall appears similar compared to prior imaging.   MACRO: None.   Signed by: Evan Finkelstein 5/6/2024 5:58 AM Dictation workstation:   [id number]     CT head W O contrast trauma protocol     Result Date: 5/6/2024  Interpreted By:  Finkelstein, Evan, STUDY: CT HEAD W/O CONTRAST TRAUMA PROTOCOL; CT CERVICAL SPINE WO IV CONTRAST;  5/6/2024 4:22 am   INDICATION: Signs/Symptoms:fall on thinners.   COMPARISON: CT brain 04/30/2017   ACCESSION NUMBER(S): [id number]; [id number]   ORDERING CLINICIAN: ARNAV BURCIAGA   TECHNIQUE: Noncontrast CT images of the head with coronal and sagittal reconstructions. Axial noncontrast CT images of the cervical spine with coronal and sagittal reconstructed images.   FINDINGS: EXTRACRANIAL SOFT TISSUES: Unremarkable.   CALVARIUM: No depressed skull fracture. No destructive osseous lesion.   PARANASAL SINUSES/MASTOIDS: The visualized paranasal sinuses and mastoid air cells are aerated.   HEMORRHAGE: No acute intracranial hemorrhage.   BRAIN PARENCHYMA: Gray-white matter interfaces are preserved. No mass effect or midline shift. There are nonspecific scattered white matter hypodensities.   VENTRICLES and EXTRA-AXIAL SPACES: Parenchymal atrophy with prominence of the ventricles and cortical sulci.   OTHER FINDINGS: There are calcifications within the cavernous carotids   CERVICAL SPINE:    ALIGNMENT: No traumatic malalignment VERTEBRAE: No acute loss of vertebral body height. Bones are demineralized DISC SPACE: Bony fusion across the C5/C6 disc space. SPINAL CANAL: Multilevel facet and uncovertebral arthropathy with varying degrees of neural foraminal stenosis. No severe central narrowing. PREVERTEBRAL SOFT TISSUES: No prevertebral soft tissue swelling. LUNG APICES: Imaged portion of the lung apices are within normal limits.   OTHER FINDINGS: None.          No acute intracranial hemorrhage, mass effect or midline shift.   Nonspecific scattered white matter hypodensities favored to represent sequela of small vessel ischemia. Cervical spondylosis without acute loss of vertebral body height or traumatic malalignment.     MACRO: None.   Signed by: Evan Finkelstein 5/6/2024 4:43 AM Dictation workstation:   [id number]     CT cervical spine wo IV contrast     Result Date: 5/6/2024  Interpreted By:  Finkelstein, Evan, STUDY: CT HEAD W/O CONTRAST TRAUMA PROTOCOL; CT CERVICAL SPINE WO IV CONTRAST;  5/6/2024 4:22 am   INDICATION: Signs/Symptoms:fall on thinners.   COMPARISON: CT brain 04/30/2017   ACCESSION NUMBER(S): [id number]; [id number]   ORDERING CLINICIAN: ARNAV BURCIAGA   TECHNIQUE: Noncontrast CT images of the head with coronal and sagittal reconstructions. Axial noncontrast CT images of the cervical spine with coronal and sagittal reconstructed images.   FINDINGS: EXTRACRANIAL SOFT TISSUES: Unremarkable.   CALVARIUM: No depressed skull fracture. No destructive osseous lesion.   PARANASAL SINUSES/MASTOIDS: The visualized paranasal sinuses and mastoid air cells are aerated.   HEMORRHAGE: No acute intracranial hemorrhage.   BRAIN PARENCHYMA: Gray-white matter interfaces are preserved. No mass effect or midline shift. There are nonspecific scattered white matter hypodensities.   VENTRICLES and EXTRA-AXIAL SPACES: Parenchymal atrophy with prominence of the ventricles and cortical sulci.   OTHER FINDINGS:  There are calcifications within the cavernous carotids   CERVICAL SPINE:   ALIGNMENT: No traumatic malalignment VERTEBRAE: No acute loss of vertebral body height. Bones are demineralized DISC SPACE: Bony fusion across the C5/C6 disc space. SPINAL CANAL: Multilevel facet and uncovertebral arthropathy with varying degrees of neural foraminal stenosis. No severe central narrowing. PREVERTEBRAL SOFT TISSUES: No prevertebral soft tissue swelling. LUNG APICES: Imaged portion of the lung apices are within normal limits.   OTHER FINDINGS: None.          No acute intracranial hemorrhage, mass effect or midline shift.   Nonspecific scattered white matter hypodensities favored to represent sequela of small vessel ischemia. Cervical spondylosis without acute loss of vertebral body height or traumatic malalignment.     MACRO: None.   Signed by: Evan Finkelstein 5/6/2024 4:43 AM Dictation workstation:   [id number]             Assessment/Plan   Principal Problem:    Pneumonia due to other specified bacteria (Multi)  Fall at home  Admit to general medicine under Dr. Roca  Acute, observation, telemetry  Continue IV Ceftriaxone and IV Zithromax  Oxygen, titrate to maintain a saturation of 92% or greater  Pain control  CBC and CMP in AM  PT/OT     Chronic conditions  Atrial fibrillation on Xarelto, Monoclonal gammopathy of unknown significance, hypertension, hyperlipidemia, aortic valve stenosis, skin cancer, prostate cancer  Continue home medications as appropriate     DVT Prophylaxis  No chemical prophylaxis, patient takes Xarelto  SCDs           I spent 45 minutes in the professional and overall care of this patient.  Patient fully evaluated and plan as above     LONNIE Ruff-CNP                 Revision History       Patient fully evaluated on May 7. Vitals stable and not on supplemental O2. White count normal with hemoglobin 11.3 and hematocrit 33.4. Bicarb is low at 20 with normal kidney function. COVID swab was  negative with pending strep pneumonia and legionella swabs. UA showed positive protein, ketones, urobilinogen, and occasional hyaline casts. Urine culture is pending. Per radiologist report, chest XR performed on 5/6/24 showed possible pulmonary contusion with recommendations to have follow up imaging for resolution status. CT of head and right hip XR performed on 5/6/24. Patient currently on azithromycin and ceftriaxone. Will ordered CT thoracic spine due to midthoracic spine pain. Start Medrol dose pack. Continue present IV antibiotics. Repeat labs in AM. Continue to monitor and support with appropriate analgesia.     Patient fully evaluated on May 8. Vitals stable. White count normal with stable H/H, bicarb still low at 19, and AST 42. Patient is negative for strep pneumonia and legionella. CT thoracic spine is pending. PT cleared patient for discharge and will follow with PT as outpatient. Patient currently on methylprednisolone for management. Will switch him to Levaquin. Repeat labs in AM. Continue to support with appropriate analgesia.     Patient fully evaluated on May 9. Vitals stable. Labs show white count 12.6 with stable H/H, BUN 30, and normal Cr and GFR. Per radiologist report, CT of thoracic spine performed 5/8/24 showed possible malignancy of 11th rib, no fracture, and bilateral pleural effusion with recommendations for chest CT. Patient with a significant history of prostate and skin cancer, will order chest CT and CT of abdomen/pelvis. He is currently on levofloxacin and methylprednisolone. Repeat labs in the AM. Continue to monitor and support with appropriate analgesia.     Patient fully evaluated on May 10. Vitals stable. Labs show white count 11.8 with stable H/H. CMP stable with borderline low sodium at 132. PSA level still pending. CT chest, abdomen, and pelvis performed on 5/9/24. Please refer to the report for full details.  Hematology saw the patient today with recommendations to check  myeloma markers, PSA level, and bone scan. Bone scan is ordered, pending results. While rounding, patient is accompanied by his wife and continues to complain of back and leg pain. Patient's wife does not think he should go home yet and thinks he needs rehab before discharge. Will start high dose steroids. Upon discharge, when medically cleared, will send to skilled nursing. Repeat labs in AM. Continue monitor and support.     Patient fully evaluated on May 11 and still having significant pain today.  PSA is negative.  Further oncology workup is in progress with blood testing.  Patient agreeable to discharge to skilled nursing for further rehab and awaiting certification.  Recheck labs in AM.     Patient fully evaluated on May 12.  Still having significant arthralgias and myalgias.  Will start the patient on oral prednisone in the meantime.  Awaiting discharge to an [facility] for further rehabilitation.    Patient fully evaluated on May 13. Vitals are stable. Labs show white count continues to remain elevated at 15.9 with stable H/H. CMP shows normal kidney function and low sodium at 128. PSA is normal with unremarkable Kappa/Lambda. Serum protein electrophoresis pending. Nephrology consulted for hyponatremia. Patient currently on prednisone. PT cleared patient for discharge. Will discharge patient.      Patient fully evaluated on May 14. Vitals are stable. Labs show white count unchanged with stable H/H and sodium continues to decline at 127. Oncology saw the patient on 5/13/24 with recommendations for biopsy of 11th rib at outpatient and to follow up with [facility] hematology clinic for further evaluation. While rounding, patient complains of leg pain. Physical exam shows lower extremity strength 3/3 bilaterally without confusion or focal neurological deficit. Repeat labs in the AM to monitor sodium.        Principal Problem:    Pneumonia due to other specified bacteria (Multi)  Active Problems:    Fall, initial  encounter                   EWELINA WILHELM

## 2024-05-14 NOTE — CONSULTS
Reason For Consult  Hyponatremia    History Of Present Illness  Case Of a 89 Year Old male with Chronic Hyponatremia.  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  CT scan of the head was unremarkable A CT scan of the chest  Disclosed a lesion at 11:00 posteriorly left side suggesting plasmacytoma, also changes of bronchiectasis bilateral pleural effusions scattered opacities to the lungs suggesting pneumonitis.  Patient also disclose calcification of the right renal artery  Patient sodium is in the mid 120 range  Patient urinary sodium 58  patient urine osmolarity 641  PSH is normal uric acid is depressed    Cortisol levels mildly elevated from stress  Patient have corrected calcium of 9.5 to  Have renal chemistries are unremarkable.  Patient is placing urea powder for now and on discharge she will be given sodium bicarbonate tablets.  Patient hyponatremia is secondary to SIADH most likely secondary to multiple factors including pneumonitis #2 plasmacytoma #3 SSRIs         Past Medical History  See present illness    Surgical History  He has no past surgical history on file.     Social History  He reports that he has never smoked. He has never been exposed to tobacco smoke. He has never used smokeless tobacco. He reports that he does not drink alcohol and does not use drugs.    Family History  No family history on file.     Allergies  Penicillins    Review of Systems  Patient main complaint is pain in the joints especially legs and ribs  He denies edema hematuria or nocturia  Rest of the review of systems unremarkable  Physical Exam    General appearance; alert oriented  HEENT; no nystagmus pupils react to light and accommodation,  Neck; no JVD no bruit no thyromegaly lungs; Bibasilar crackles on  inspiration  Heart; irregular heart rate no gallop no rubs no murmurs  Abdomen; active peristalsis no rebound guarding organomegaly or shifting dullness  ; no CVA tenderness  Musculoskeletal; mild point tenderness in the knees and ankles full range of motion no edema  Neurological; no tremors pathological reflexes are absent       I&O 24HR    Intake/Output Summary (Last 24 hours) at 5/14/2024 1114  Last data filed at 5/13/2024 1428  Gross per 24 hour   Intake --   Output 200 ml   Net -200 ml       Vitals 24HR  Heart Rate:  [69-93]   Temp:  [35.9 °C (96.6 °F)-37 °C (98.6 °F)]   Resp:  [16-18]   BP: (132-150)/(63-68)   SpO2:  [92 %-94 %]     Results for orders placed or performed during the hospital encounter of 05/06/24 (from the past 24 hour(s))   Urine electrolytes   Result Value Ref Range    Sodium, Urine Random 58 mmol/L    Sodium/Creatinine Ratio 90 Not established. mmol/g Creat    Potassium, Urine Random 37 mmol/L    Potassium/Creatinine Ratio 57 Not established mmol/g Creat    Chloride, Urine Random 52 mmol/L    Chloride/Creatinine Ratio 81 23 - 275 mmol/g creat    Creatinine, Urine Random 64.5 20.0 - 370.0 mg/dL   Albumin, Urine Random   Result Value Ref Range    Albumin, Urine Random 7.7 Not established mg/L    Creatinine, Urine Random 64.5 20.0 - 370.0 mg/dL    Albumin/Creatine Ratio 11.9 <30.0 ug/mg Creat   Osmolality, urine   Result Value Ref Range    Osmolality, Urine Random 641 200 - 1,200 mOsm/kg   Uric Acid   Result Value Ref Range    Uric Acid 6.8 4.0 - 7.5 mg/dL   CBC   Result Value Ref Range    WBC 15.1 (H) 4.4 - 11.3 x10*3/uL    nRBC 0.0 0.0 - 0.0 /100 WBCs    RBC 3.68 (L) 4.50 - 5.90 x10*6/uL    Hemoglobin 11.6 (L) 13.5 - 17.5 g/dL    Hematocrit 33.3 (L) 41.0 - 52.0 %    MCV 91 80 - 100 fL    MCH 31.5 26.0 - 34.0 pg    MCHC 34.8 32.0 - 36.0 g/dL    RDW 13.4 11.5 - 14.5 %    Platelets 211 150 - 450 x10*3/uL   Comprehensive Metabolic Panel   Result Value Ref Range    Glucose 80 74 - 99 mg/dL     Sodium 127 (L) 136 - 145 mmol/L    Potassium 4.5 3.5 - 5.3 mmol/L    Chloride 96 (L) 98 - 107 mmol/L    Bicarbonate 21 21 - 32 mmol/L    Anion Gap 15 10 - 20 mmol/L    Urea Nitrogen 33 (H) 6 - 23 mg/dL    Creatinine 0.71 0.50 - 1.30 mg/dL    eGFR 88 >60 mL/min/1.73m*2    Calcium 8.8 8.6 - 10.3 mg/dL    Albumin 3.1 (L) 3.4 - 5.0 g/dL    Alkaline Phosphatase 103 33 - 136 U/L    Total Protein 6.1 (L) 6.4 - 8.2 g/dL    AST 35 9 - 39 U/L    Bilirubin, Total 0.7 0.0 - 1.2 mg/dL    ALT 22 10 - 52 U/L   Phosphorus   Result Value Ref Range    Phosphorus 3.5 2.5 - 4.9 mg/dL     XR skeletal survey complete    Result Date: 5/11/2024  Interpreted By:  Bri Kim, STUDY: XR SKELETAL SURVEY COMPLETE 5/10/2024 7:09 pm   INDICATION: Signs/Symptoms:Lytic lesion involving left 11th ribs   COMPARISON: None available.   ACCESSION NUMBER(S): [id number]   ORDERING CLINICIAN: ISAK KAMARA   TECHNIQUE: Skeletal survey is performed.   FINDINGS: Osteolytic bony destruction of the left 11th rib posteriorly is seen. No additional osteolytic or osteoblastic bony lesions are identified. There is no endosteal scalloping seen within the long bones. No pathologic compression fractures of the spine are identified.   There is postoperative change from bipolar right hip arthroplasty.   Bilateral perihilar infiltrates are noted.       Solitary osteolytic lesion destroying posterior left 11th rib. 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 The possibility of a solitary osteolytic bony metastasis is not excluded however.   Signed by: Bri Kim 5/11/2024 2:01 PM Dictation workstation:   [id number]    CT chest abdomen pelvis w IV contrast    Result Date: 5/10/2024  Interpreted By:  Robert Alarcon, STUDY: CT CHEST ABDOMEN PELVIS W IV CONTRAST;  5/9/2024 6:23 pm   INDICATION: Signs/Symptoms:pain   COMPARISON: None.   ACCESSION NUMBER(S): [id number]   ORDERING CLINICIAN: ISAK KAMARA   TECHNIQUE: CT of the chest, abdomen, and pelvis was performed.  Sequential transaxial images were obtained with orthogonal reconstructions. 75mL of  Omnipaque 350 was injected intravenously.   FINDINGS: CHEST:   LUNG/PLEURA/LARGE AIRWAYS: There are small to moderate bilateral pleural effusions with mild posterior basilar compressive atelectasis. Additionally there are ground-glass and partially consolidating infiltrates at the upper lobes with mild bronchiectasis, with additional small area of ground-glass attenuation at the superior segment of the left lower lobe, most likely from multifocal pneumonia.     VESSELS: The thoracic aorta is of normal course and caliber , but with mild atherosclerotic calcification .   Main pulmonary artery and its branches are normal in caliber.   There is moderate coronary artery atherosclerotic calcification primarily involving the LAD.   HEART: The heart is normal in size. There is no pericardial effusion.   MEDIASTINUM AND VALE: There is a borderline AP window node measuring 1 cm in short axis, probably reactive. However, early malignant node is a consideration given the left 11th rib lesion described below.   The thyroid gland as visualized appears unremarkable.   CHEST WALL AND LOWER NECK: There is an expansile and lytic lesion of the left 11th rib posteriorly, measuring 4.7 x 3 cm. This would be most consistent with malignancy, probably metastatic but primary lesion such as plasmacytoma etc. Is possible. There is hypodensity measuring approximately 8 mm at the T7 and T8 endplates, probably benign notochordal remnants. However, an early lytic lesion given the left rib is also a consideration.   There is mild subcutaneous edema of the lower lateral chest walls, greater on the right.   ABDOMEN:   LIVER: The hepatic parenchyma is homogeneous without evidence of focal liver lesions.The hepatic size is normal.   SPLEEN: The spleen is normal in size and homogeneous.   ADRENAL GLANDS: Bilateral adrenal glands appear normal.   KIDNEYS AND URETERS:  The renal cortices are unremarkable and the renal sizes within normal limits , with cortical cyst(s).   The distal right ureter at the pelvis is obscured due to beam hardening artifact from a right hip replacement. Otherwise no identified urinary tract dilatation or calculi.   PANCREAS: The pancreas appears unremarkable, there is no ductal dilatation or masses.   GALLBLADDER: No radiodense calculi, wall thickening or pericholecystic fluid.   BILE DUCTS: There is no biliary dilatation or filling defects.     VESSELS: There is moderate to extensive atherosclerotic calcification of the aorta and iliac vessels, and proximal 1 cm segment of the right renal artery. There is mild atherosclerotic calcification of the proximal left renal artery. There is also moderate atherosclerotic plaque of the superior mesenteric artery proximally.   PERITONEUM AND RETROPERITONEUM: No ascites considering obscured right lower pelvis due to the right hip arthroplasty. No free intraperitoneal air.   The retroperitoneum appears unremarkable, and without significant adenopathy.   No enlarged mesenteric lymph nodes.   BOWEL: Moderate diverticulosis greatest at the distal descending and proximal sigmoid colon. Bowel at the lower pelvis is partially obscured again due to beam hardening artifact from a right hip arthroplasty. There is mild gastric distention with intraluminal content probably from recent ingestion. The bowel including the appendix otherwise is unremarkable without significant dilatation or mural thickening.   PELVIS:   BLADDER: Partially obscured, otherwise grossly unremarkable.   REPRODUCTIVE ORGANS: The pelvis, particularly right laterally is obscured by beam hardening, otherwise grossly unremarkable.     BONE, ABDOMINAL WALL AND OTHER FINDINGS: Relative sclerosis measuring 1.4 cm at the right sacral ala may be due to an enostosis, but blastic metastasis is possible.   The abdominal wall soft tissues appear normal.       CHEST  1.  Small to moderate bilateral pleural effusions with compressive posterior basilar atelectasis. There are additional infiltrates greater in the upper lobes probably from multifocal pneumonia. 2. Expansile lytic lesion the left 11th rib, probably metastasis although primary bone malignancy is possible. 3. Borderline AP window node.   ABDOMEN - PELVIS 1.  1.5 cm sclerosis at the right sacral ala, possibly blastic metastasis. If further evaluation is needed a bone scan would be recommended for skeletal survey. 2. Moderate diverticulosis.   MACRO: 1.   Signed by: Robert Alarcon 5/10/2024 9:19 AM Dictation workstation:   [id number]    CT thoracic spine wo IV contrast    Result Date: 5/9/2024  Interpreted By:  Jc Tran, STUDY: CT THORACIC SPINE WO IV CONTRAST;  5/8/2024 3:55 pm   INDICATION: Signs/Symptoms:pain.   COMPARISON: None.   ACCESSION NUMBER(S): [id number]   ORDERING CLINICIAN: ISAK KAMARA   TECHNIQUE: Thin cut axial CT images through the thoracic spine were obtained and reconstructed in the coronal and sagittal planes.   FINDINGS: Counting from below, the caudal most ribs are noted to be small and for the sake of numbering purposes will be labeled as the 12th ribs.   There is a lytic expansile osseous lesion involving the  posterior left 11th rib with surrounding intermediate attenuation soft tissue fullness most concerning for underlying neoplasm/metastatic disease.   There are small vague lucencies within the inferior T7 and superior T8 vertebrae as seen on sagittal reconstructed slice 54 of 101 which could be benign or malignant in etiology.   There is exaggeration of the kyphotic curvature of the thoracic spine. There is 2 mm of anterolisthesis of T1 on T2.   No acute fracture is noted.   There is bony ankylosis of mid and lower thoracic vertebrae.   There is spondylosis noted throughout the thoracic as well as the partially imaged upper lumbar and lower cervical regions.   There are bilateral  pleural effusions. There are scattered somewhat ill-defined areas of opacity/airspace disease within the lungs bilaterally. Correlation with a dedicated CT of the chest would be recommended.         Counting from below, the caudal most ribs are noted to be small and for the sake of numbering purposes will be labeled as the 12th ribs.   There is a lytic expansile osseous lesion involving the  posterior left 11th rib with surrounding intermediate attenuation soft tissue fullness most concerning for underlying neoplasm/metastatic disease.   There are small vague lucencies within the inferior T7 and superior T8 vertebrae as seen on sagittal reconstructed slice 54 of 101 which could be benign or malignant in etiology.   There is exaggeration of the kyphotic curvature of the thoracic spine. There is 2 mm of anterolisthesis of T1 on T2.   No acute fracture is noted.   There is bony ankylosis of mid and lower thoracic vertebrae.   There is spondylosis noted throughout the thoracic as well as the partially imaged upper lumbar and lower cervical regions.   There are bilateral pleural effusions. There are scattered somewhat ill-defined areas of opacity/airspace disease within the lungs bilaterally. Correlation with a dedicated CT of the chest would be recommended.   MACRO: None   Signed by: Jc Tran 5/9/2024 5:53 AM Dictation workstation:   [id number]    XR hip right with pelvis when performed 2 or 3 views    Result Date: 5/6/2024  Interpreted By:  Finkelstein, Evan, STUDY: XR HIP RIGHT WITH PELVIS WHEN PERFORMED 2 OR 3 VIEWS;  5/6/2024 4:51 am three views right hip. AP view of the pelvis   INDICATION: Signs/Symptoms:fall, right lateral hip pain.   COMPARISON: Pelvic radiograph 04/30/2017   ACCESSION NUMBER(S): [id number]   ORDERING CLINICIAN: ARNAV BURCIAGA   FINDINGS: Right hip arthroplasty hardware is present and appears intact. No surrounding lucency or periprosthetic fracture. Bones are demineralized. There are  vascular calcifications. No acute displaced fracture or dislocation of the pelvis. No widening of the pubic symphysis. Degenerative changes in the visualized lower lumbosacral spine.       Right hip arthroplasty hardware present without surrounding lucency or periprosthetic fracture. No acute displaced fracture or dislocation of the pelvis.     MACRO: None.   Signed by: Evan Finkelstein 5/6/2024 6:00 AM Dictation workstation:   [id number]    XR chest 1 view    Result Date: 5/6/2024  Interpreted By:  Finkelstein, Evan, STUDY: XR CHEST 1 VIEW;  5/6/2024 4:31 am   INDICATION: Signs/Symptoms:Trauma.   COMPARISON: Chest radiograph 04/30/2017   ACCESSION NUMBER(S): [id number]   ORDERING CLINICIAN: ARNAV BURCIAGA   FINDINGS:     CARDIOMEDIASTINAL SILHOUETTE: Enlarged cardiac silhouette, similar compared to prior imaging. Calcifications overlie the aortic arch.   LUNGS: Patchy airspace opacities overlying the upper aspect of the lungs bilaterally. No pneumothorax   ABDOMEN: No remarkable upper abdominal findings.   BONES: There are irregularities along the lateral aspect of multiple left-sided ribs, which appear similar compared to prior imaging.       Patchy airspace opacities overlying the upper aspect of the lungs bilaterally. Findings may represent pulmonary contusions in the setting of trauma. Recommend follow-up to resolution. Irregularities along the lateral aspect of multiple left-sided ribs, which overall appears similar compared to prior imaging.   MACRO: None.   Signed by: Evan Finkelstein 5/6/2024 5:58 AM Dictation workstation:   [id number]    CT head W O contrast trauma protocol    Result Date: 5/6/2024  Interpreted By:  Finkelstein, Evan, STUDY: CT HEAD W/O CONTRAST TRAUMA PROTOCOL; CT CERVICAL SPINE WO IV CONTRAST;  5/6/2024 4:22 am   INDICATION: Signs/Symptoms:fall on thinners.   COMPARISON: CT brain 04/30/2017   ACCESSION NUMBER(S): [id number]; [id number]   ORDERING CLINICIAN: ARNAV BURCIAGA   TECHNIQUE:  Noncontrast CT images of the head with coronal and sagittal reconstructions. Axial noncontrast CT images of the cervical spine with coronal and sagittal reconstructed images.   FINDINGS: EXTRACRANIAL SOFT TISSUES: Unremarkable.   CALVARIUM: No depressed skull fracture. No destructive osseous lesion.   PARANASAL SINUSES/MASTOIDS: The visualized paranasal sinuses and mastoid air cells are aerated.   HEMORRHAGE: No acute intracranial hemorrhage.   BRAIN PARENCHYMA: Gray-white matter interfaces are preserved. No mass effect or midline shift. There are nonspecific scattered white matter hypodensities.   VENTRICLES and EXTRA-AXIAL SPACES: Parenchymal atrophy with prominence of the ventricles and cortical sulci.   OTHER FINDINGS: There are calcifications within the cavernous carotids   CERVICAL SPINE:   ALIGNMENT: No traumatic malalignment VERTEBRAE: No acute loss of vertebral body height. Bones are demineralized DISC SPACE: Bony fusion across the C5/C6 disc space. SPINAL CANAL: Multilevel facet and uncovertebral arthropathy with varying degrees of neural foraminal stenosis. No severe central narrowing. PREVERTEBRAL SOFT TISSUES: No prevertebral soft tissue swelling. LUNG APICES: Imaged portion of the lung apices are within normal limits.   OTHER FINDINGS: None.         No acute intracranial hemorrhage, mass effect or midline shift.   Nonspecific scattered white matter hypodensities favored to represent sequela of small vessel ischemia. Cervical spondylosis without acute loss of vertebral body height or traumatic malalignment.     MACRO: None.   Signed by: Evan Finkelstein 5/6/2024 4:43 AM Dictation workstation:   [id number]    CT cervical spine wo IV contrast    Result Date: 5/6/2024  Interpreted By:  Finkelstein, Evan, STUDY: CT HEAD W/O CONTRAST TRAUMA PROTOCOL; CT CERVICAL SPINE WO IV CONTRAST;  5/6/2024 4:22 am   INDICATION: Signs/Symptoms:fall on thinners.   COMPARISON: CT brain 04/30/2017   ACCESSION NUMBER(S):  [id number]; [id number]   ORDERING CLINICIAN: ARNAV BURCIAGA   TECHNIQUE: Noncontrast CT images of the head with coronal and sagittal reconstructions. Axial noncontrast CT images of the cervical spine with coronal and sagittal reconstructed images.   FINDINGS: EXTRACRANIAL SOFT TISSUES: Unremarkable.   CALVARIUM: No depressed skull fracture. No destructive osseous lesion.   PARANASAL SINUSES/MASTOIDS: The visualized paranasal sinuses and mastoid air cells are aerated.   HEMORRHAGE: No acute intracranial hemorrhage.   BRAIN PARENCHYMA: Gray-white matter interfaces are preserved. No mass effect or midline shift. There are nonspecific scattered white matter hypodensities.   VENTRICLES and EXTRA-AXIAL SPACES: Parenchymal atrophy with prominence of the ventricles and cortical sulci.   OTHER FINDINGS: There are calcifications within the cavernous carotids   CERVICAL SPINE:   ALIGNMENT: No traumatic malalignment VERTEBRAE: No acute loss of vertebral body height. Bones are demineralized DISC SPACE: Bony fusion across the C5/C6 disc space. SPINAL CANAL: Multilevel facet and uncovertebral arthropathy with varying degrees of neural foraminal stenosis. No severe central narrowing. PREVERTEBRAL SOFT TISSUES: No prevertebral soft tissue swelling. LUNG APICES: Imaged portion of the lung apices are within normal limits.   OTHER FINDINGS: None.         No acute intracranial hemorrhage, mass effect or midline shift.   Nonspecific scattered white matter hypodensities favored to represent sequela of small vessel ischemia. Cervical spondylosis without acute loss of vertebral body height or traumatic malalignment.     MACRO: None.   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

## 2024-05-14 NOTE — CARE PLAN
Problem: Pain  Goal: My pain/discomfort is manageable  Outcome: Progressing     Problem: Safety  Goal: Patient will be injury free during hospitalization  Outcome: Progressing  Goal: I will remain free of falls  Outcome: Progressing     Problem: Daily Care  Goal: Daily care needs are met  Outcome: Progressing     Problem: Psychosocial Needs  Goal: Demonstrates ability to cope with hospitalization/illness  Outcome: Progressing  Goal: Collaborate with me, my family, and caregiver to identify my specific goals  Outcome: Progressing     Problem: Discharge Barriers  Goal: My discharge needs are met  Outcome: Progressing     Problem: Skin  Goal: Decreased wound size/increased tissue granulation at next dressing change  Outcome: Progressing  Goal: Participates in plan/prevention/treatment measures  Outcome: Progressing  Goal: Prevent/manage excess moisture  Outcome: Progressing  Goal: Prevent/minimize sheer/friction injuries  Outcome: Progressing  Goal: Promote/optimize nutrition  Outcome: Progressing  Goal: Promote skin healing  Outcome: Progressing   The patient's goals for the shift include remain safe    The clinical goals for the shift include Patient to remain free of injury throughout shift    Over the shift, the patient did not make progress toward the following goals. Barriers to progression include patient needs a decrease in sodium for labs. Recommendations to address these barriers include remind patient to use alfonso light when in need of the nurse.

## 2024-05-29 PROBLEM — R06.02 SHORTNESS OF BREATH: Status: ACTIVE | Noted: 2024-01-01

## 2024-05-29 NOTE — PROGRESS NOTES
Patient is from [facility], where he receives skilled care.  Attempted to meet with patient and spouse.  Spouse is indicating she does not want patient to return to [facility].  Patient with increased O2 demands, on CPAP, and unable to participate in assessment.  Care Transitions will continue to follow.

## 2024-05-29 NOTE — PROGRESS NOTES
Discussed with RN: bleeding has slowed down and pt has received vit K.      Will dose 2 FFP per pharm recs to reverse AC, follow INR.      Pancho Florian, DO

## 2024-05-29 NOTE — H&P
History Of Present Illness  Rodo Fam is a 89 y.o. male with past medical history of hypertension, A-fib Xarelto, moderate AV stenosis, MGUS, prostate cancer, malignant melanoma, basal cell carcinoma, SIADH.  Majority of history was obtained from wife and niece that are at bedside, patient unable to contribute to history due to physical status.  Patient was recently admitted after fall at home last week he was then discharged to acute rehab.  During that admission he treated for right lower lobe pneumonia and sent to rehab with antibiotics and prednisone.  Over the course last few days patient has been complaining of shortness of breath, yesterday evening patient required nasal cannula to maintain sats above 90%.  This morning at the nursing facility was noted to have increased work of breathing and was then transferred to [facility] [place] ED for evaluation.  On arrival patient's heart rate 130 /78 requiring Airvo 60 L 40% to maintain saturations greater than 90%.  Initial labs showed glucose 200, creatinine 1.65 (baseline 0.9), lactate 11 which later down trended to 7.9, K5.2, mag 3.49, bicarb 18, WBC 14.6, Hgb 9.1, INR 3.2, UA normal.  CT chest was done which showed no PE, large left effusion, atelectasis, and bibasilar pneumonia.  CT scan also showed known lytic lesion on the 11th rib which he has been following for with heme-onc, last time seen was 3 weeks ago and recommended skeletal survey.  In the ED the patient received received 2.5 L bolus, Vanco, cefepime, Zithromax.  Patient was transported to the ICU for continuation of care.     Past Medical History  He has a past medical history of Hypertension.    Surgical History  He has no past surgical history on file.     Social History  He reports that he has never smoked. He has never been exposed to tobacco smoke. He has never used smokeless tobacco. He reports that he does not drink alcohol and does not use drugs.    Family History  No family history on  file.     Allergies  Penicillins    Review of Systems  10 point ROS was unable to be obtained due to mental status   Physical Exam   Physical Exam:   General appearance: acute distress, ill-appearing   HEENT: Atraumatic/normocephalic, moist mucosa  Neck: supple without obvious goiter, JVD not appreciated  Respiratory: decreased air movement, increased respiratory effort, bl crackles, decreased breath sounds on left  Cardiovascular: irregular irregular, no peripheral edema  Abdomen: no organomegaly, no tenderness to palpation in all quadrants  Extremities: strong peripheral pulses, no grossly obvious deformities   Skin: intact, no rashes   Neurologic: Alert and oriented x 2, No obvious focal deficit  Psych: appropriate mood & affect, cooperative   Last Recorded Vitals  /60   Pulse 100   Temp 36.2 °C (97.2 °F) (Temporal)   Resp (!) 24   Wt 81.6 kg (180 lb)   SpO2 97%     Relevant Results             Assessment/Plan   Principal Problem:    Shortness of breath      Neurological System:  Avoid  sedating medication  Avoid excessive caths   Maintain normal sleep/wake cycle     Cardiovascular System:  #Chronic afib on xarelto   #HTN  #AV steosis  #T2MI  Last Echo: 3/8/2024 LVEF  70% +/- 5%, Dilated left atrium, right atrium, Moderate aortic valve stenosis  Bolsed 2.5L in ED with good response, will bolus prn   Hold xarelto as patient with need thoracentesis  Vitamin K 1x  Trend troponin   HR improved with fluid resuscitation, can give prn metop if needed      Respiratory System:  #Acute hypoxic respiratory failure   #HAP  #Large left pleural effusion   Requiring airvo 60L 40%, will switch to cpap if needed  Zosyn   Discussed thoracentesis or chest tube placement with poa, awaiting decision if they would like the procedure done  Wean home 20mg prednisone   Viral and resp cult pending   Repeat abg in am   Encourage use if Incentive Spirometer,  q1  HOB > 30 degrees      Gastrointestinal System:  Diet:  NPO  Prophylaxis: Protonix   No current  issues      Endocrine System:  #Hyperglycemia   No hx of diabetes, likely secondary to steroid use   SSI      Renal System:  #HERNÁN  #Lactic acidosis   #Hx siadh   Repeat lactate   Will hold home urea tabs for now   Urine lytes pending   Monitor I&Os  Trend renal panel and replace lectrolytes    Hematological System:  #MGUS  #Hx Basal cell carcinoma   #Hx prostate ca  Currently follow with hemonc as outpatient  Previously seen for 11th rib lytic lesion, recommended skeletal survey  Had prostatectomy around 2010  Has never received any radiation or chemo   H&H stable, trend  DVT ppx with SCDs      Infection Disease System:  #HAP  Zosyn 5/29-  Blood cultures pending  Viral and sputum cult sent  UA negative  Follow up with cultures and adjust abx as appropriate   Monitor for s&s of infection    Vascular system & Extremities:  PIV        Case to be/ discussed with attending, A&P above reflect tentative plan. Please await for final signature from attending physician on service.           Rodo Leon, DO

## 2024-05-29 NOTE — CARE PLAN
Problem: Skin  Goal: Decreased wound size/increased tissue granulation at next dressing change  5/29/2024 1542 by Vale Orr RN  Outcome: Progressing  Flowsheets (Taken 5/29/2024 1542)  Decreased wound size/increased tissue granulation at next dressing change: Protective dressings over bony prominences  5/29/2024 1458 by Vale Orr RN  Outcome: Progressing     Problem: Skin  Goal: Decreased wound size/increased tissue granulation at next dressing change  5/29/2024 1542 by Vale Orr RN  Outcome: Progressing  Flowsheets (Taken 5/29/2024 1542)  Decreased wound size/increased tissue granulation at next dressing change: Protective dressings over bony prominences  5/29/2024 1458 by Vale Orr RN  Outcome: Progressing     Problem: Skin  Goal: Decreased wound size/increased tissue granulation at next dressing change  5/29/2024 1542 by Vale Orr RN  Outcome: Progressing  Flowsheets (Taken 5/29/2024 1542)  Decreased wound size/increased tissue granulation at next dressing change: Protective dressings over bony prominences  5/29/2024 1458 by Vale Orr RN  Outcome: Progressing     Problem: Skin  Goal: Participates in plan/prevention/treatment measures  5/29/2024 1542 by Vale Orr RN  Outcome: Progressing  Flowsheets (Taken 5/29/2024 1542)  Participates in plan/prevention/treatment measures: Discuss with provider PT/OT consult  5/29/2024 1458 by Vale Orr RN  Outcome: Progressing     Problem: Skin  Goal: Prevent/manage excess moisture  5/29/2024 1542 by Vale Orr RN  Outcome: Progressing  Flowsheets (Taken 5/29/2024 1542)  Prevent/manage excess moisture: Moisturize dry skin  5/29/2024 1458 by Vale Orr RN  Outcome: Progressing     Problem: Skin  Goal: Prevent/minimize sheer/friction injuries  5/29/2024 1542 by Vale Orr RN  Outcome: Progressing  Flowsheets (Taken 5/29/2024 1542)  Prevent/minimize sheer/friction injuries: Turn/reposition every 2 hours/use positioning/transfer  devices  5/29/2024 1458 by Vale Orr RN  Outcome: Progressing     Problem: Skin  Goal: Promote/optimize nutrition  5/29/2024 1542 by Vale Orr RN  Outcome: Progressing  Flowsheets (Taken 5/29/2024 1542)  Promote/optimize nutrition: Monitor/record intake including meals  5/29/2024 1458 by Vale Orr RN  Outcome: Progressing     Problem: Skin  Goal: Promote skin healing  5/29/2024 1542 by Vale Orr RN  Outcome: Progressing  Flowsheets (Taken 5/29/2024 1542)  Promote skin healing: Turn/reposition every 2 hours/use positioning/transfer devices  5/29/2024 1458 by Vale Orr RN  Outcome: Progressing   The patient's goals for the shift include      The clinical goals for the shift include pt will remain hemodynamically stable

## 2024-05-29 NOTE — ED TRIAGE NOTES
PT BIBA FROM [facility] FOR RESP DISTRESS. PT REPORTS SOB YESTERDAY. PLACED ON 3 L FOR COMFORT. TODAY PT MOUTH BREATHING WITH AMS. ON ARRIVAL TO ED PT TACHYPNIC AND MOUTH BREATHING ON NRB.

## 2024-05-29 NOTE — ED PROVIDER NOTES
HPI   Chief Complaint   Patient presents with   • Shortness of Breath       Patient is a 89-year-old male past medical history of atrial fibrillation on Xarelto and hypertension presenting to the emergency department for evaluation for shortness of breath.  Patient reportedly is not on nasal cannula at nursing facility was tachypneic short of breath and hypoxic at nursing facility and subsequently sent to the ER for evaluation.  Patient states he is short of breath he denies any chest pain or any other muscle aches and pains at this time he denies any nausea vomiting fever chills.  No other history was reported.                        [place] Coma Scale Score: 15                     Patient History   Past Medical History:   Diagnosis Date   • Hypertension      History reviewed. No pertinent surgical history.  No family history on file.  Social History     Tobacco Use   • Smoking status: Never     Passive exposure: Never   • Smokeless tobacco: Never   Vaping Use   • Vaping status: Never Used   Substance Use Topics   • Alcohol use: Never   • Drug use: Never       Physical Exam   ED Triage Vitals [05/29/24 0831]   Temperature Heart Rate Respirations BP   36.2 °C (97.2 °F) (!) 130 (!) 24 127/78      Pulse Ox Temp Source Heart Rate Source Patient Position   100 % Temporal Monitor Sitting      BP Location FiO2 (%)     Right arm --       Physical Exam  Vitals reviewed.   Constitutional:       Appearance: Normal appearance.   HENT:      Head: Normocephalic and atraumatic.      Nose: Nose normal.      Mouth/Throat:      Mouth: Mucous membranes are moist.   Eyes:      Extraocular Movements: Extraocular movements intact.      Conjunctiva/sclera: Conjunctivae normal.   Cardiovascular:      Rate and Rhythm: Tachycardia present. Rhythm irregular.      Pulses: Normal pulses.      Heart sounds: Normal heart sounds.   Pulmonary:      Effort: Tachypnea present.      Breath sounds: Normal breath sounds. No decreased breath sounds,  wheezing, rhonchi or rales.   Abdominal:      General: Abdomen is flat. Bowel sounds are normal.      Palpations: Abdomen is soft.   Musculoskeletal:         General: Normal range of motion.   Skin:     General: Skin is warm and dry.   Neurological:      Mental Status: He is alert and oriented to person, place, and time. Mental status is at baseline.      Cranial Nerves: Cranial nerves 2-12 are intact.      Sensory: Sensation is intact.      Motor: Motor function is intact.   Psychiatric:         Mood and Affect: Mood normal.       ED Course & MDM   ED Course as of 05/29/24 1626   Wed May 29, 2024   0841 89-year-old history of atrial fibrillation on Xarelto and hypertension presented to the emergency department for shortness of breath.  On examination signs show tachycardia 130 and tachypnea 24 patient currently saturating at 100% on a nonrebreather.  Patient has equal breath sounds bilaterally he is irregularly irregular and tachycardic no murmurs appreciated differential diagnosis includes underlying dysrhythmia NSTEMI pulmonary embolism.  CTA of the chest EKG troponin CBC CMP magnesium coagulation studies BMP peptide COVID-19 testing and a chest x-ray have been ordered.  EKG shows atrial fibrillation rapid ventricular rate with PVC ventricular 135 QRS 72 QTc 480.  I requested for patient to be placed on high flow nasal cannula at this time [ZS]   0925 Leukocytosis of 14.6 lactate of 11.2 on blood gas patient is not acidotic chest x-ray shows left-sided infiltrate IV cefepime, vancomycin and azithromycin have been ordered patient will be given IV fluids [ZS]   1246 Heart Rate: 99 [ZS]   1625 Admitted to ICU due to persistently elevated lactic [ZS]      ED Course User Index  [ZS] Britney Lin MD         Diagnoses as of 05/29/24 1626   Shortness of breath   Pneumonia due to infectious organism, unspecified laterality, unspecified part of lung   Sepsis, due to unspecified organism, unspecified whether acute organ  dysfunction present (Multi)       Medical Decision Making      Procedure  Critical Care    Performed by: Britney Lin MD  Authorized by: Britney Lin MD    Critical care provider statement:     Critical care time (minutes):  20    Critical care time was exclusive of:  Separately billable procedures and treating other patients    Critical care was necessary to treat or prevent imminent or life-threatening deterioration of the following conditions:  Sepsis    Critical care was time spent personally by me on the following activities:  Ordering and performing treatments and interventions, ordering and review of laboratory studies, ordering and review of radiographic studies, pulse oximetry, re-evaluation of patient's condition, development of treatment plan with patient or surrogate, evaluation of patient's response to treatment, examination of patient and obtaining history from patient or surrogate    Care discussed with: admitting provider         Britney Lin MD  05/29/24 9565

## 2024-05-29 NOTE — PROGRESS NOTES
PHARMACIST INTERVENTION - BLOOD FACTOR STEWARDSHIP    Patient Name: Rodo Fam  MRN: [id number]  Location: 172/172-A    Patient Weight (kg):   Wt Readings from Last 1 Encounters:   05/29/24 81.6 kg (179 lb 14.3 oz)          Rodo Fam is a 89 y.o. male presenting to [facility] critical care with hemothorax.  This patient is taking rivaroxaban outpatient.    Visit Vitals  BP 96/69   Pulse (!) 116   Temp 36.3 °C (97.3 °F) (Temporal)   Resp (!) 36      Estimated Creatinine Clearance: 35.7 mL/min (A) (by C-G formula based on SCr of 1.45 mg/dL (H)).    Initial Hgb: 9.1  Initial INR: 3.2  Initial protime: 36.5    Most recent dose of rivaroxaban was taken 5/28/2024 at 08:30 (36 hours ago).     Discussion with Dr. Fabienne Jacob and Daniel Weiland, Critical Care PharmD via telephone to assess the utility of administering Kcentra outside of the generally accepted reversal timeframe per  policy (< 18 hours).     Further conversation was required given the patient's acute renal dysfunction and rivaroxaban's extended half-life in renal dysfunction (11 to 13 hours). Our team came to the conclusion that at this point in time the patient would be expected to have cleared his most recent dose of rivaroxaban. Therefore eliminating the utility of a reversal agent.     Due to the patient's hemodynamic instability, the plan moving forward is to administer fresh frozen plasma.     Patient did not meet pre-defined life threatening bleed criteria per  anticoagulation reversal guidelines. Kcentra was not administered.      Meryl Montague, PharmD

## 2024-05-29 NOTE — CARE PLAN
Problem: Pain - Adult  Goal: Verbalizes/displays adequate comfort level or baseline comfort level  Outcome: Progressing     Problem: Safety - Adult  Goal: Free from fall injury  Outcome: Progressing     Problem: Discharge Planning  Goal: Discharge to home or other facility with appropriate resources  Outcome: Progressing     Problem: Chronic Conditions and Co-morbidities  Goal: Patient's chronic conditions and co-morbidity symptoms are monitored and maintained or improved  Outcome: Progressing     Problem: Skin  Goal: Decreased wound size/increased tissue granulation at next dressing change  Outcome: Progressing  Goal: Participates in plan/prevention/treatment measures  Outcome: Progressing  Goal: Prevent/manage excess moisture  Outcome: Progressing  Goal: Prevent/minimize sheer/friction injuries  Outcome: Progressing  Goal: Promote/optimize nutrition  Outcome: Progressing  Goal: Promote skin healing  Outcome: Progressing     Problem: Fall/Injury  Goal: Not fall by end of shift  Outcome: Progressing  Goal: Be free from injury by end of the shift  Outcome: Progressing  Goal: Verbalize understanding of personal risk factors for fall in the hospital  Outcome: Progressing  Goal: Verbalize understanding of risk factor reduction measures to prevent injury from fall in the home  Outcome: Progressing  Goal: Use assistive devices by end of the shift  Outcome: Progressing  Goal: Pace activities to prevent fatigue by end of the shift  Outcome: Progressing     Problem: Respiratory  Goal: Clear secretions with interventions this shift  Outcome: Progressing  Goal: Minimize anxiety/maximize coping throughout shift  Outcome: Progressing  Goal: Minimal/no exertional discomfort or dyspnea this shift  Outcome: Progressing  Goal: No signs of respiratory distress (eg. Use of accessory muscles. Peds grunting)  Outcome: Progressing  Goal: Patent airway maintained this shift  Outcome: Progressing  Goal: Tolerate mechanical ventilation  evidenced by VS/agitation level this shift  Outcome: Progressing  Goal: Tolerate pulmonary toileting this shift  Outcome: Progressing  Goal: Verbalize decreased shortness of breath this shift  Outcome: Progressing  Goal: Wean oxygen to maintain O2 saturation per order/standard this shift  Outcome: Progressing  Goal: Increase self care and/or family involvement in next 24 hours  Outcome: Progressing   The patient's goals for the shift include      The clinical goals for the shift include pt will remain hemodynamically stable

## 2024-05-29 NOTE — PROCEDURES
Chest Tube Insertion    Date/Time: 5/29/2024 6:37 PM    Performed by: Fabienne Jacob MD  Authorized by: Fabienne Jacob MD    Consent:     Consent obtained:  Written    Consent given by:  Spouse    Risks, benefits, and alternatives were discussed: yes      Risks discussed:  Bleeding, incomplete drainage, pain, infection and damage to surrounding structures    Alternatives discussed:  Delayed treatment  Universal protocol:     Procedure explained and questions answered to patient or proxy's satisfaction: yes      Relevant documents present and verified: yes      Test results available: yes      Imaging studies available: yes      Required blood products, implants, devices, and special equipment available: no      Site/side marked: yes      Immediately prior to procedure, a time out was called: yes      Patient identity confirmed:  Verbally with patient, hospital-assigned identification number, arm band and provided demographic data  Pre-procedure details:     Skin preparation:  Chlorhexidine  Sedation:     Sedation type:  None  Anesthesia:     Anesthesia method:  Local infiltration    Local anesthetic:  Lidocaine 1% w/o epi  Procedure details:     Placement location:  L lateral    Scalpel size:  10    Tube size (Fr):  16    Ultrasound guidance: yes      Tension pneumothorax: no      Tube connected to:  Suction    Drainage characteristics:  Bloody    Suture material:  2-0 silk    Dressing:  4x4 sterile gauze  Post-procedure details:     Post-insertion x-ray findings: tube in good position      Procedure completion:  Tolerated      Patient had a hemorrhagic effusion. He had about 1500 out in to the atirum at the end of the procedure. Case was discussed with Dr Law who is thoracic surgeon at bedside. He recommended medical management. Plan will be Vit K for INR reversal, Kcentra for reversal   Of DOAC, 1U PRBCs ordered for drop in hgb to 7.2 from 11.5 on discharge a few weeks ago. Furthermore, patient placed on Bpap  for positive pressure for lung re-expansion. Advised nurse to clamp after 2L of output. Also attached atrium to sucction. Patient was signed out to Dr Florian intesivist on call with plan. CXR reviewed with good re-expansion of left lung and appropriate placement of the Pigtail chest tube.

## 2024-05-29 NOTE — CONSULTS
"Patient is a 89-year-old male past medical history of atrial fibrillation on Xarelto and hypertension presenting to the emergency department for evaluation for shortness of breath. Patient reportedly is not on nasal cannula at nursing facility was tachypneic short of breath and hypoxic at nursing facility and subsequently sent to the ER for evaluation. Patient states he is short of breath he denies any chest pain or any other muscle aches and pains at this time he denies any nausea vomiting fever chills. No other history was reported.     Radiographic imaging showed a large left pleural effusion.  The ICU team admitted him and placed a left pigtail catheter, which was notable for immediate drainage of bloody fluid.    I have reviewed his CT scan which shows a large left pleural effusion    Lab Results   Component Value Date    WBC 13.3 (H) 05/29/2024    HGB 7.9 (L) 05/29/2024    HCT 23.7 (L) 05/29/2024    MCV 98 05/29/2024     (L) 05/29/2024         Assessment and plan::    This patient has a large left pleural effusion, which may represent spontaneous hemorrhage in the setting of anticoagulation.    I agree with percutaneous drainage and observational management with supportive care of hemorrhagic shock with transfusion and reversal of anticoagulation as needed    This patient is relatively high risk for surgery given his frailty, anticoagulation, and medical comorbidities.  I would advocate for nonsurgical management, reserving surgical intervention as a \"last resort\".    Please contact me with changes to the patient's clinical status    I saw the patient at the bedside in the ICU and performed a face to face evaluation.   They remain critically ill with impaired organ function and have a high probability of life threatening deterioration, based on hemorrhagic shock.  I recommend supportive care  In aggregate, I spent 30 minutes of dedicated time in the critical care evaluation and management of this " patient.

## 2024-05-29 NOTE — PROGRESS NOTES
Pharmacy Medication History Review    Rodo Fam is a 89 y.o. male admitted for No Principal Problem: There is no principal problem currently on the Problem List. Please update the Problem List and refresh.. Pharmacy reviewed the patient's [id number] medications and allergies for accuracy.    The list below reflectives the updated PTA list. Please review each medication in order reconciliation for additional clarification and justification.  Prior to Admission medications    Medication Sig Start Date End Date Taking? Authorizing Provider   acetaminophen (Tylenol) 325 mg tablet Take 2 tablets (650 mg) by mouth every 4 hours if needed for mild pain (1 - 3).  Patient taking differently: Take 2 tablets (650 mg) by mouth every 4 hours if needed for mild pain (1 - 3) or fever (temp greater than 38.0 C). 5/13/24  Yes Ike Roca MD   amLODIPine (Norvasc) 5 mg tablet Take 1 tablet (5 mg) by mouth once daily. 2/13/24  Yes Historical Provider, MD   bisacodyl (Dulcolax) 10 mg suppository Insert 1 suppository (10 mg) into the rectum once daily as needed for constipation.   Yes Historical Provider, MD   guaiFENesin (Humibid 3) 400 mg tablet Take 1 tablet (400 mg) by mouth 2 times a day. 5/29/24 6/3/24 Yes Historical Provider, MD   lidocaine 4 % patch Place 1 patch over 12 hours on the skin once daily. Remove & discard patch within 12 hours or as directed by MD.  Patient taking differently: Place 1 patch on the skin once daily. Apply to Lower Back. Remove & discard patch within 12 hours or as directed by MD. 5/14/24  Yes Ike Roca MD   magnesium hydroxide (Milk of Magnesia) 400 mg/5 mL suspension Take 30 mL by mouth once daily as needed for constipation.   Yes Historical Provider, MD   mirtazapine (Remeron Sol-Tab) 15 mg disintegrating tablet Take 1 tablet (15 mg) by mouth once daily at bedtime. 5/13/24 6/12/24 Yes Ike Roca MD   oxygen (O2) gas therapy Inhale 2 L/min once every 24 hours.  Patient  taking differently: Inhale 2 L/min if needed. 5/13/24  Yes Ike Roca MD   predniSONE (Deltasone) 20 mg tablet Take 1 tablet (20 mg) by mouth once daily. 5/14/24  Yes Ike Roca MD   rivaroxaban (Xarelto) 20 mg tablet Take 1 tablet (20 mg) by mouth once daily. 11/15/23  Yes Historical Provider, MD   sodium bicarbonate 650 mg tablet Take 1 tablet (650 mg) by mouth 2 times a day. 5/14/24  Yes Mario Alberto Toledo MD   sodium phosphates (Fleet Enema) 19-7 gram/118 mL enema enema Insert 118 mL into the rectum once daily as needed for constipation.   Yes Historical Provider, MD   urea (Ure-Na) 15 gram powder in packet oral powder Take 30 g by mouth 2 times a day. 5/13/24  Yes Ike Roca MD        The list below reflectives the updated allergy list. Please review each documented allergy for additional clarification and justification.  Allergies  Reviewed by Shama Austin on 5/29/2024        Severity Reactions Comments    Penicillins Not Specified Unknown             Below are additional concerns with the patient's PTA list.    Medication list from [facility], printed 05/29/2024 1016.    Shama Austin

## 2024-05-30 NOTE — PROGRESS NOTES
"Rodo Fam is a 89 y.o. male with afib on AC/MGUS/prostate CA/malignant melanoma/basal cell CA on day 0 of admission presenting with Shortness of breath.  Course complicated by large hemothorax, s/p insertion of chest tube.  Chest tube has decreased in drainage and now with total of about 1600ml since placement of chest tube.  Continues on BiPAP and tolerating well.  Ongoing PRBCs and FFP transfusions to reverse eliquis AC.    Has b/l air entry without R/R/W at this time.    Repeat CXR reviewed and R lung appears expanded with improvement of pleural effusion.    Last Recorded Vitals  Blood pressure 126/61, pulse (!) 116, temperature 36.4 °C (97.5 °F), temperature source Temporal, resp. rate (!) 27, height 1.778 m (5' 10\"), weight 81.6 kg (179 lb 14.3 oz), SpO2 100%.  Intake/Output last 3 Shifts:  I/O last 3 completed shifts:  In: 3338 (40.9 mL/kg) [IV Piggyback:3338]  Out: 150 (1.8 mL/kg) [Urine:150 (0.1 mL/kg/hr)]  Weight: 81.6 kg     Relevant Results      Assessment/Plan   Principal Problem:    Shortness of breath  Hemothorax s/p chest tube drainage     Plan: follow chest tube output and clamp at 2L of drainage, serial CXR, BiPAP positive ventilation, follow CBC/INR post transfusions, appreciate thoracic surgery recs.    Cumulative CCM time today without procedures > 85 minutes in the professional and overall care of this patient.      Pancho Florian, DO      "

## 2024-05-30 NOTE — PROGRESS NOTES
Thoracic Surgery  Progress Note     Rodo Fam is a 89 y.o. male on day 1 of admission presenting with Shortness of breath.    Subjective     Interval HPI: Seen and assessed patient this AM while they were laying in bed.     Review of Systems   Constitutional: Positive for malaise/fatigue. Negative for decreased appetite.   HENT:  Negative for congestion.    Eyes:  Negative for blurred vision and double vision.   Cardiovascular:  Negative for chest pain, dyspnea on exertion, irregular heartbeat, leg swelling, orthopnea and palpitations.   Respiratory:  Positive for cough. Negative for shortness of breath.    Endocrine: Negative for cold intolerance and heat intolerance.   Skin:  Negative for nail changes, poor wound healing and rash.   Musculoskeletal:  Negative for arthritis, muscle cramps, muscle weakness, myalgias and stiffness.   Gastrointestinal:  Negative for bloating, nausea and vomiting.   Genitourinary:  Negative for frequency, hesitancy and urgency.   Neurological:  Negative for dizziness, focal weakness, light-headedness and weakness.   Psychiatric/Behavioral:  Negative for altered mental status.    Allergic/Immunologic: Negative for environmental allergies.         Objective   Physical Exam  Physical Exam  Constitutional:       General: He is not in acute distress.     Appearance: Normal appearance. He is not ill-appearing.   HENT:      Head: Normocephalic and atraumatic.      Nose: Nose normal.      Mouth/Throat:      Mouth: Mucous membranes are moist.      Pharynx: Oropharynx is clear.   Eyes:      Extraocular Movements: Extraocular movements intact.      Conjunctiva/sclera: Conjunctivae normal.      Pupils: Pupils are equal, round, and reactive to light.   Cardiovascular:      Rate and Rhythm: Normal rate and regular rhythm.      Pulses: Normal pulses.      Heart sounds: Normal heart sounds, S1 normal and S2 normal. No murmur heard.     No systolic murmur is present.      No friction rub. No  gallop.   Pulmonary:      Effort: Pulmonary effort is normal.      Breath sounds: Normal breath sounds.   Abdominal:      General: Abdomen is flat. Bowel sounds are normal. There is no distension.      Palpations: Abdomen is soft.   Musculoskeletal:         General: Normal range of motion.      Cervical back: Normal range of motion and neck supple.      Right lower leg: No edema.      Left lower leg: No edema.   Skin:     General: Skin is warm and dry.      Capillary Refill: Capillary refill takes less than 2 seconds.      Findings: No lesion.      Nails: There is no clubbing.   Neurological:      General: No focal deficit present.      Mental Status: He is alert and oriented to person, place, and time. Mental status is at baseline.      Cranial Nerves: Cranial nerves 2-12 are intact.      Sensory: Sensation is intact.      Motor: Motor function is intact.   Psychiatric:         Attention and Perception: Attention and perception normal.         Mood and Affect: Mood normal.         Speech: Speech normal.         Behavior: Behavior normal.         Thought Content: Thought content normal.         Cognition and Memory: Cognition and memory normal.         Judgment: Judgment normal.         Last Recorded Vitals  Vitals:    05/30/24 1200 05/30/24 1300 05/30/24 1400 05/30/24 1500   BP: 124/58 129/60 127/61 120/62   Pulse: 107 107 106 100   Resp: 18 21 21 20   Temp: 36.4 °C (97.5 °F)      TempSrc:       SpO2: 100% 100% 98% 100%   Weight:       Height:            Intake/Output last 3 Shifts:  I/O last 3 completed shifts:  In: 4302 (54.7 mL/kg) [Blood:864; IV Piggyback:3438]  Out: 1750 (22.2 mL/kg) [Urine:350 (0.1 mL/kg/hr); Chest Tube:1400]  Weight: 78.7 kg     Inpatient Medications  cefepime, 2 g, intravenous, q12h  pantoprazole, 40 mg, oral, Daily before breakfast   Or  esomeprazole, 40 mg, nasoduodenal tube, Daily before breakfast   Or  pantoprazole, 40 mg, intravenous, Daily before breakfast  insulin lispro, 0-5 Units,  subcutaneous, TID  oxygen, , inhalation, q4h  [START ON 5/31/2024] predniSONE, 10 mg, oral, Daily      Continuous medications     PRN medications  PRN medications: acetaminophen, dextrose, dextrose, glucagon, glucagon, morphine, oxygen     LDA:  Chest Tube Left 16 Fr (Active)   Placement Date/Time: 05/29/24 (c) 1837   Chest Tube Orientation: Left  Chest Tube Drain Tube Size (Fr): 16 Fr  Chest Tube Drainage System: Suction   Number of days: 0       Relevant Results  Lab Review  Results from last 7 days   Lab Units 05/30/24  0435   WBC AUTO x10*3/uL 10.8   HEMOGLOBIN g/dL 9.4*   HEMATOCRIT % 28.7*   PLATELETS AUTO x10*3/uL 97*     Results from last 7 days   Lab Units 05/30/24  0435 05/29/24  1551 05/29/24  0850   SODIUM mmol/L 143 143 144   POTASSIUM mmol/L 4.9 4.4 5.2   CHLORIDE mmol/L 109* 108* 105   CO2 mmol/L 22 21 18*   BUN mg/dL 87* 76* 92*   CREATININE mg/dL 1.65* 1.45* 1.65*   CALCIUM mg/dL 8.9 8.5* 9.6   PROTEIN TOTAL g/dL  --  5.0* 5.2*   BILIRUBIN TOTAL mg/dL  --   --  0.9   ALK PHOS U/L  --   --  96   ALT U/L  --   --  14   AST U/L  --   --  43*   GLUCOSE mg/dL 190* 237* 208*     Results from last 7 days   Lab Units 05/30/24  0435   MAGNESIUM mg/dL 3.27*     Results from last 7 days   Lab Units 05/29/24  0916   POCT PH, ARTERIAL pH 7.48*   POCT PCO2, ARTERIAL mm Hg 20*   POCT PO2, ARTERIAL mm Hg 145*   POCT HCO3 CALCULATED, ARTERIAL mmol/L 14.9*   POCT BASE EXCESS, ARTERIAL mmol/L -7.1*                History of Present Illness:  Rodo Fam is a 89-year-old male past medical history of atrial fibrillation (on Xarelto) and HTN presenting to the [facility] ED on 5/29/24 with complaints of dyspnea.     Patient reportedly is not on nasal cannula at nursing facility was tachypneic short of breath and hypoxic at nursing facility and subsequently sent to the ER for evaluation. Patient states he is short of breath he denies any chest pain or any other muscle aches and pains at  this time he denies any nausea vomiting fever chills. No other history was reported.      Radiographic imaging showed a large left pleural effusion.  The ICU team admitted him and placed a left pigtail catheter, which was notable for immediate drainage of bloody fluid.    Daily Events    5/30/24: Seen and assessed patient this AM; chest tube output has slowed after patient was administered blood products per ICU. Would recommend maintaining  L pleural chest tube to suction.     L Pleural Output: 1600mL     Assessment & Plan       Left Pleural Effusion   - Patient is on Rivaroxaban 20mg every day (longstanding persistent atrial fibrillation)  --> Found to be with L sided hemothorax after 14 fr pigtail catheter placement   - Though initial output was high, it has subsequently slowed with administration of blood productions   - Given the patient's age and frailty, surgical intervention should be reserved  - Maintain chest tube to suction for now  - Follow output's & CXR   - Obviously, continue holding AC   - Further care per primary     Above patient seen and plan discussed with thoracic surgeon, Dr. Coleman Law. Plan as above. .     Heron Lerma, APRN-CNP

## 2024-05-30 NOTE — PROGRESS NOTES
Rodo Fam is a 89 y.o. male on day 1 of admission presenting with Shortness of breath.      Subjective   Patient seen and examined this am. Approx 1.7L out of chest tube. Will bolus 1 L. Continue to trend lactate. Will give additional dose of vitamin k today.        Objective     Last Recorded Vitals  /60   Pulse 109   Temp 36.2 °C (97.2 °F) (Temporal)   Resp (!) 28   Wt 78.7 kg (173 lb 8 oz)   SpO2 99%   Intake/Output last 3 Shifts:    Intake/Output Summary (Last 24 hours) at 5/30/2024 1321  Last data filed at 5/30/2024 1100  Gross per 24 hour   Intake 1464 ml   Output 1820 ml   Net -356 ml       Admission Weight  Weight: 81.6 kg (180 lb) (05/29/24 0831)    Daily Weight  05/30/24 : 78.7 kg (173 lb 8 oz)    Image Results  XR chest 1 view  Narrative: STUDY:  Chest Radiograph, One View; 05/29/2024 11:16 PM   INDICATION:  Evaluate chest tube.  COMPARISON:  XR chest 05/29/2024 at 18:30, 05/06/2024.  (Images only).  ACCESSION NUMBER(S):  [id number]  ORDERING CLINICIAN:    JOSIAH DUNNE  TECHNIQUE:  Frontal chest was obtained at 23:10 hours.  FINDINGS:  CARDIOMEDIASTINAL SILHOUETTE:  Cardiomediastinal silhouette is normal in size and configuration.   Calcified plaque is seen in the aorta.     LUNGS:  Left suprahilar fibrotic changes noted.  Pleural drain is seen at the  left lung base with a small moderate left pleural effusion and  airspace disease at the left lung base.     ABDOMEN:  No remarkable upper abdominal findings.     BONES:  No acute osseous changes.  Generalized osseous demineralization is  noted.  Impression: Left basilar airspace disease with a small left effusion and pleural  drain in place, likely atelectasis although left lower lobe pneumonia  is a possibility in the appropriate clinical setting.  Stable chronic fibrotic changes in the left suprahilar region,  superimposed pneumonia is also a possibility.  Signed by Hossein Cervantes      Physical Exam  General appearance: acute distress,  ill-appearing   HEENT: Atraumatic/normocephalic, moist mucosa  Neck: supple without obvious goiter, JVD not appreciated  Respiratory: improved air movement, increased respiratory effort, bl crackles, decreased breath sounds on left improved, chest tube in placed  Cardiovascular: irregular irregular, no peripheral edema  Abdomen: no organomegaly, no tenderness to palpation in all quadrants  Extremities: strong peripheral pulses, no grossly obvious deformities   Skin: intact, no rashes   Neurologic: Alert and oriented x 2, No obvious focal deficit  Psych: appropriate mood & affect, cooperative  Relevant Results               Assessment/Plan                  Principal Problem:    Shortness of breath    Neurological System:  Avoid  sedating medication  Avoid excessive caths   Maintain normal sleep/wake cycle      Cardiovascular System:  #Chronic afib on xarelto   #HTN  #AV steosis  #T2MI  Last Echo: 3/8/2024 LVEF  70% +/- 5%, Dilated left atrium, right atrium, Moderate aortic valve stenosis  1L bolus   Hold xarelto   Vitamin K 2x and ffp given   HR improved with fluid resuscitation, can give prn metop if needed        Respiratory System:  #Acute hypoxic respiratory failure   #HAP  #Large left pleural effusion   #Hemothorax  Nasal canula 3L, Cpap prn   Cefepime  Thoracentesis done yesterday without complications, 1.7L removed  Exudative per fluid studies   10mg prednisone, continue to wean   Evaluated by thoracic surgery, no intervention at this sandra e  Viral and resp cult pending   Encourage use if Incentive Spirometer,  q1  HOB > 30 degrees        Gastrointestinal System:  Diet: NPO, beside swallow today   Prophylaxis: Protonix   No current  issues        Endocrine System:  #Hyperglycemia   No hx of diabetes, likely secondary to steroid use   SSI        Renal System:  #HERNÁN  #Lactic acidosis   #Hx siadh   Trend lactate   Will hold home urea tabs for now   Monitor I&Os  Trend renal panel and replace lectrolytes      Hematological System:  #MGUS  #Hx Basal cell carcinoma   #Hx prostate ca  Currently follow with hemonc as outpatient  Previously seen for 11th rib lytic lesion, recommended skeletal survey  Had prostatectomy around 2010  Has never received any radiation or chemo   H&H trend, s/p 1U prbc  DVT ppx with SCDs        Infection Disease System:  #HAP  Cefepime 5/29-  Blood cultures pending  Viral and sputum cult sent  UA negative  Follow up with cultures and adjust abx as appropriate   Monitor for s&s of infection     Vascular system & Extremities:  KASEY Leon, DO

## 2024-05-30 NOTE — PROGRESS NOTES
05/30/24 1600   Discharge Planning   Living Arrangements Alone  ([facility])   Support Systems Spouse/significant other;Family members   Assistance Needed Total care, therapy ambulating w/walker at facility   Type of Residence Skilled nursing facility   Do you have animals or pets at home? No   Home or Post Acute Services Post acute facilities (Rehab/SNF/etc)   Type of Post Acute Facility Services Skilled nursing   Patient expects to be discharged to: [facility]   Does the patient need discharge transport arranged? Yes   RoundTrip coordination needed? Yes   Has discharge transport been arranged? No   Patient Choice   Provider Choice list and CMS website (https://medicare.gov/care-compare#search) for post-acute Quality and Resource Measure Data were provided and reviewed with: Family   Patient / Family choosing to utilize agency / facility established prior to hospitalization No     This TCC met with patient, spouse and niece at bedside, introduced self and explained role.  Patient sleeping and not participating in assessment.  Demographic information and insurance verified.  Spouse had concerns regarding insurance coverage, questions answered.  Patient is from [facility].  Spouse states she want's to bring patient home at discharge and want him to get his therapy here.  [facility]  This TCC explained discharge process, PT/OT evals, and potential therapy needs for safe discharge.  Wife feels she can care for patient at home.  Niece verbalized understanding and stated patient does not want to return to [facility].  New SNF list left with spouse.  Niece also inquired about Palliative Care and requested to speak with resident caring for patient.  Provider notified of niece's requests.  Patient will need transportation arranged.  Care Transitions will continue to follow.

## 2024-05-31 NOTE — CONSULTS
Consults    History Of Present Illness  Rodo Fam is a 89 y.o. male with past medical history of hypertension, A-fib Xarelto, moderate AV stenosis, MGUS, prostate cancer, malignant melanoma, basal cell carcinoma, SIADH who presented to the emergency department due to shortness of breath.  The patient required Airvo 60 L 40% to maintain adequate saturation.  Notable labs include creatinine of 1.65, lactate of 11, a white blood cell count of 14.8.  CT scan showed no known lytic lesion of the 11th rib, no PE, large left effusion and bibasilar pneumonia. He was admitted to the intensive care unit 5-29, started on broad-spectrum IV antibiotics.  The patient was found to have a large pleural effusion and CT surgery was consulted for chest tube placement.  Patient developed acute blood loss anemia due to spontaneous hemothorax.  Was given vitamin K, FFP for reversal with success.  The patient was stabilized and the ICU team consulted general medicine for further management of this case.     Past Medical History  He has a past medical history of Hypertension.    Surgical History  He has no past surgical history on file.     Social History  He reports that he has never smoked. He has never been exposed to tobacco smoke. He has never used smokeless tobacco. He reports that he does not drink alcohol and does not use drugs.    Family History  No family history on file.     Allergies  Penicillins       Physical Exam  Constitutional: No respiratory acute distress, cooperative, confused, elderly male  Eyes: EOMI, clear sclera  ENMT: mucous membranes dry  Head/Neck: Neck supple, Trachea midline  Respiratory/Thorax: CTAB, no wheezes, rhonchi at the left lung base  Cardiovascular: irregularly irregular, distal pulses 2+, no edema  Gastrointestinal: non tender, no palpable masses  Musculoskeletal: ROM intact, no gross deformity, thin  Neurological: No focal deficits, normal sensation  Psychological: Appropriate mood and behavior,  confused  Skin: Warm and dry, CT with dark brown-red output     Last Recorded Vitals  /80   Pulse 109   Temp 35.8 °C (96.4 °F) (Temporal)   Resp (!) 46   Wt 76.5 kg (168 lb 10.4 oz)   SpO2 90%     Relevant Results  Scheduled medications  cefepime, 2 g, intravenous, q12h  pantoprazole, 40 mg, oral, Daily before breakfast   Or  esomeprazole, 40 mg, nasoduodenal tube, Daily before breakfast   Or  pantoprazole, 40 mg, intravenous, Daily before breakfast  insulin lispro, 0-5 Units, subcutaneous, TID  metoprolol tartrate, 12.5 mg, oral, BID  [START ON 6/1/2024] predniSONE, 5 mg, oral, Daily        Continuous medications     PRN medications  PRN medications: acetaminophen, dextrose, dextrose, glucagon, glucagon, morphine, oxygen, oxygen  Results for orders placed or performed during the hospital encounter of 05/29/24 (from the past 24 hour(s))   Lactate   Result Value Ref Range    Lactate 3.7 (H) 0.4 - 2.0 mmol/L   POCT GLUCOSE   Result Value Ref Range    POCT Glucose 152 (H) 74 - 99 mg/dL   Lactate   Result Value Ref Range    Lactate 4.1 (HH) 0.4 - 2.0 mmol/L   POCT GLUCOSE   Result Value Ref Range    POCT Glucose 193 (H) 74 - 99 mg/dL   Lactate   Result Value Ref Range    Lactate 4.6 (HH) 0.4 - 2.0 mmol/L   Lactate   Result Value Ref Range    Lactate 4.6 (HH) 0.4 - 2.0 mmol/L   CBC   Result Value Ref Range    WBC 10.1 4.4 - 11.3 x10*3/uL    nRBC 7.1 (H) 0.0 - 0.0 /100 WBCs    RBC 2.95 (L) 4.50 - 5.90 x10*6/uL    Hemoglobin 9.7 (L) 13.5 - 17.5 g/dL    Hematocrit 28.9 (L) 41.0 - 52.0 %    MCV 98 80 - 100 fL    MCH 32.9 26.0 - 34.0 pg    MCHC 33.6 32.0 - 36.0 g/dL    RDW 14.7 (H) 11.5 - 14.5 %    Platelets 100 (L) 150 - 450 x10*3/uL   Renal Function Panel   Result Value Ref Range    Glucose 172 (H) 74 - 99 mg/dL    Sodium 144 136 - 145 mmol/L    Potassium 4.6 3.5 - 5.3 mmol/L    Chloride 110 (H) 98 - 107 mmol/L    Bicarbonate 23 21 - 32 mmol/L    Anion Gap 16 10 - 20 mmol/L    Urea Nitrogen 81 (H) 6 - 23 mg/dL     Creatinine 1.42 (H) 0.50 - 1.30 mg/dL    eGFR 47 (L) >60 mL/min/1.73m*2    Calcium 9.2 8.6 - 10.3 mg/dL    Phosphorus 4.3 2.5 - 4.9 mg/dL    Albumin 2.7 (L) 3.4 - 5.0 g/dL   Magnesium   Result Value Ref Range    Magnesium 3.20 (H) 1.60 - 2.40 mg/dL   Lactate   Result Value Ref Range    Lactate 3.7 (H) 0.4 - 2.0 mmol/L   POCT GLUCOSE   Result Value Ref Range    POCT Glucose 155 (H) 74 - 99 mg/dL   Lactate   Result Value Ref Range    Lactate 3.6 (H) 0.4 - 2.0 mmol/L   SST TOP   Result Value Ref Range    Extra Tube Hold for add-ons.    Lactate   Result Value Ref Range    Lactate 3.8 (H) 0.4 - 2.0 mmol/L   Protime-INR   Result Value Ref Range    Protime 15.7 (H) 9.8 - 12.8 seconds    INR 1.4 (H) 0.9 - 1.1     [unfilled]      Assessment/Plan   Rodo Fam is a 89 y.o. male with past medical history of hypertension, A-fib Xarelto, moderate AV stenosis, MGUS, prostate cancer, malignant melanoma, basal cell carcinoma, SIADH who presented to the emergency department on 5-29 shortness of breath.     Acute hypoxic respiratory failure, improving  Hospital-acquired pneumonia   Large left-sided pleural effusion  Atelectasis  Acute on chronic anemia secondary to hemothorax, improving  -Currently on several liters nasal cannula.  On CPAP and nasal cannula.  May benefit from AVAPS or incentive spirometry.  Will defer to respiratory therapy as they continue bronchopulmonary hygiene.   -Pneumonia workup, negative strep and Legionella and MRSA.  Vancomycin discontinued Mycoplasma pending.  Continuing cefepime and azithromycin day 2 of antibiotic treatment.    -Chest tube in place with significant drainage, appreciate CT surgery recommendations.  Tube is currently to suction, significant output.  Hemothorax was treated with FFP and vitamin K as reversal agents and the patient responded appropriately. Chest x-rays as needed  HERNÁN, improving  Lactic acidosis  -Continue to monitor renal function panel, fluid balance.  Creatinine fluctuating  between 1.65 and 1.4 to, overall downtrend since admission.  Baseline seems close to 0 .7, 0 .8  -Trend lactate level.  Chronic A-fib, on chronic anticoagulation  Moderate aortic valve stenosis  Elevated troponin, Type 2 MI, demand ischemia, from above  -Holding home Xarelto given hemothorax and current chest tube  -Telemetry monitoring    Diet: Puréed diet with moderately thick liquids.  Meds crushed in purée.  Spoon feeds only.  Feeding one-to-one  DVT ppx: SCDs  IVF: As needed  Consults: CT surgery  Dispo: General medicine, telemetry.    Plans to eventually return to skilled nursing facility with palliative care    I have reviewed and interpreted all lab test imaging studies and documentations from other healthcare providers. Case to be discussed with attending, A&P above reflect tentative plan. Please await for final signature from attending physician on service.    Scarlet Rodríguez MD PGY-2  Please message me if you have any questions

## 2024-05-31 NOTE — PROGRESS NOTES
Speech-Language Pathology    SLP Adult Inpatient Speech-Language Pathology Clinical Swallow Evaluation    Patient Name: Rodo Fam  MRN: [id number]  Today's Date: 5/31/2024   Time Calculation  Start Time: 1010  Stop Time: 1027  Time Calculation (min): 17 min         Current Problem:   1. Shortness of breath  Chest Tube Insertion    Chest Tube Insertion      2. Pneumonia due to infectious organism, unspecified laterality, unspecified part of lung  Chest Tube Insertion    Chest Tube Insertion      3. Sepsis, due to unspecified organism, unspecified whether acute organ dysfunction present (Multi)        4. Hemothorax on left  Chest Tube Insertion    Chest Tube Insertion      5. Acute hypoxic respiratory failure (Multi)              Recommendations:  Risk for Aspiration: Yes  Solid Diet Recommendations : Pureed/extremely thick  (IDDSI Level 4)  Liquid Diet Recommendations: Honey thick/liquidised/moderately thick (IDDS Level 3)  Compensatory Swallowing Strategies: Upright 90 degrees as possible for all oral intake, No straws, Remain upright for 20-30 minutes after meals, Small bites/sips, Eat/feed slowly    If overt s/s aspiration observed with downgraded puree/moderately thick diet, would recommend pt be made NPO. Meds crushed in puree. All PO via tsp.       Assessment:  Prognosis: Guarded. Pt seen bedside for CSE following working with OT/PT. Pt currently on ETC/thin liquid diet but coughing this morning with water and am meds per nurse; breakfast tray held. Pt on 3L 02.       Plan:  Inpatient/Swing Bed or Outpatient: Inpatient  Treatment/Interventions: Assess diet tolerance, Diet recommendations  SLP Plan: Skilled SLP  SLP Frequency: 2x per week    Dysphagia goals:  Pt will tolerate prescribed diet of puree and moderately thick liquids without overt s/s aspiration.   Ongoing diet/meal analysis.  Pt/family education.       Subjective   Current Problem: Below copied from admission on 5/29  Patient is a 89-year-old male  past medical history of atrial fibrillation on Xarelto and hypertension presenting to the emergency department for evaluation for shortness of breath. Patient reportedly is not on nasal cannula at nursing facility was tachypneic short of breath and hypoxic at nursing facility and subsequently sent to the ER for evaluation. Patient states he is short of breath he denies any chest pain or any other muscle aches and pains at this time he denies any nausea vomiting fever chills. No other history was reported. Patient is a 89-year-old male past medical history of atrial fibrillation on Xarelto and hypertension presenting to the emergency department for evaluation for shortness of breath. Patient reportedly is not on nasal cannula at nursing facility was tachypneic short of breath and hypoxic at nursing facility and subsequently sent to the ER for evaluation. Patient states he is short of breath he denies any chest pain or any other muscle aches and pains at this time he denies any nausea vomiting fever chills. No other history was reported.     Pt had chest tube placed on 5/29/24. No ST hx found over last 5 years per chart review.     Medical hx: hypertension    CXR 5/29: IMPRESSION:  Left basilar airspace disease with a small left effusion and pleural  drain in place, likely atelectasis although left lower lobe pneumonia  is a possibility in the appropriate clinical setting.  Stable chronic fibrotic changes in the left suprahilar region,  superimposed pneumonia is also a possibility.    General Visit Information:  Patient Class: Inpatient  Ordering Physician: Rodo Leon DO  Date of Onset: 05/29/24  Date of Order: 05/31/24  [employer] Diet: unknown/suspect regular/thin  Current Diet : ETC/Thin         Objective       Baseline Assessment:     Pt seen bedside for CSE. Pt sitting upright in bed but looks very fatigued with waxing/waning OLE initially; improved OLE as CSE progressed. Pt initially with reduced pulse ox  in high 80's, then returned to 90's prior to any PO. Pt with moist, weak cough prior to any PO. Per nurse pt speaks Indonesian primarily. Natural dentition in oral cavity and in reduced condition.     Pt given mild and moderately thick liquids via tsp and puree textures for CSE. Also seen with med pass (crushed in sauce). Pt exhibiting a moderate oropharyngeal dysphagia compounded by current medical condition and weakness and fatigue. Poor vocal quality on command with weak, breathy vocal quality evident. Adequate oral skills for modified textures given including mild, moderate thick liquids and puree all VIA TSP. No oral residue post swallow. Mildly delayed swallow response felt on palpation. Intermittent cough post mildly thick liquids; which appeared similar to baseline cough prior to any PO. Adequate tolerance of puree textures via tsp and moderately thick liquids via tsp with no change in vocal quality or overt s/s aspiration. Pt is very weak at bedside. Pulse ox did remain in high 90's throughout CSE and observed PO.     CAUTIOUSLY recommend a puree diet with moderately thick liquids. ALL PO SHOULD BE GIVEN BY TSP. IF OVERT S/S ASPIRATION SUSPECTED WITH DOWNGRADED DIET, RECOMMEND PT BE MADE NPO. RECOMMEND MEDS CRUSHED IN PUREE CARRIER.       Pain:  Pain Assessment: 0-10  Pain Score: 0 - No pain       Clinical Observations:  Patient Positioning: Upright in Bed        Inpatient:  Education Documentation    Reviewed results and recommendations and POC with nurse Short and patient.

## 2024-05-31 NOTE — PROGRESS NOTES
Physical Therapy    Physical Therapy Evaluation    Patient Name: Rodo Fam  MRN: [id number]  172/172-A    Today's Date: 5/31/2024   Time Calculation  Start Time: 0944  Stop Time: 1005  Time Calculation (min): 21 min    Assessment/Plan   PT Assessment  PT Assessment Results: Decreased strength, Decreased endurance, Impaired balance, Decreased mobility  Rehab Prognosis: Good  End of Session Communication: Bedside nurse  End of Session Patient Position: Bed, 4 rail up, Alarm off, not on at start of session  IP OR SWING BED PT PLAN  Inpatient or Swing Bed: Inpatient  PT Plan  Treatment/Interventions: Bed mobility, Transfer training, Gait training, Balance training, Strengthening, Therapeutic exercise, Therapeutic activity  PT Plan: Skilled PT  PT Frequency: 3 times per week  PT Discharge Recommendations: Moderate intensity level of continued care  PT - OK to Discharge: Yes (when cleared by medical team)    Subjective     Current Problem:  Patient Active Problem List   Diagnosis    Pneumonia due to other specified bacteria (Multi)    Fall, initial encounter    Shortness of breath       General Visit Information:  General  Reason for Referral: PT eval and treat; sob, pneumonia, sepsis, ct chest: (-) pe, larg L pleural effusion, atelectasis, pneumonia, lytic lesion, 11th rib (pt. followed by heme-onc), thoracic surgery consult: chest tube for now, lactate 3.7 downtrending  Past Medical History Relevant to Rehab: HTN, HLD, MGUS, Afib on xalrelto, aortic valve stenosis, skin cancer, prostate cancer  Prior to Session Communication: Bedside nurse  Patient Position Received: Bed, 4 rail up, Alarm off, not on at start of session  General Comment: Pt resting in bed upon entering, agreeable to PT.    Home Living:  Home Living  Home Living Comments: pt. admitted from SNF, prior to May 2024, pt. home with spouse, in a house 3 step entry with rail, full bath/bed 2nd floor with rail, 1/2 bath 1st floor, basement laundry and rec  room, tub/shower, st. toilet, owns st. walker    Prior Level of Function:  Prior Function Per Pt/Caregiver Report  Prior Function Comments: prior to early May 2024, pt. able to complete adl tasks, spouse was doing iadl tasks, no use of device for ambulation and was driving. since early May 2024 hospitalization pt. discharged to SNF where he was working with therapies, not ambulating, standing with parallel bars    Precautions:  Precautions  Precautions Comment: o2 3 L/min, telemetry, external catheter, chest tube x 1    Vital Signs:  Vital Signs  Heart Rate:  (VSS throughout session)  Objective     Pain:  Pain Assessment  Pain Assessment: 0-10  Pain Score: 0 - No pain    Cognition:  Cognition  Overall Cognitive Status: Within Functional Limits (difficult to understand, pt with decreased voice volume)    General Assessments:         Sensation  Light Touch: No apparent deficits  Strength  Strength Comments:  (BLE 4-/5)      Functional Assessments:     Bed Mobility  Bed Mobility:  (supine to sit with max assist x 2 with HOB raised)  Transfers  Transfer:  (sit to stand with mod assist x 2 with WW, 2 trials. cues for hand placement, safety and technique)  Ambulation/Gait Training  Ambulation/Gait Training Performed:  (pt unable to take sidesteps)      Outcome Measures:  [facility] Basic Mobility  Turning from your back to your side while in a flat bed without using bedrails: A lot  Moving from lying on your back to sitting on the side of a flat bed without using bedrails: A lot  Moving to and from bed to chair (including a wheelchair): Total  Standing up from a chair using your arms (e.g. wheelchair or bedside chair): A lot  To walk in hospital room: Total  Climbing 3-5 steps with railing: Total  Basic Mobility - Total Score: 9      Goals:  Encounter Problems       Encounter Problems (Active)       PT Problem       STG - Pt will amb 50' using WW with MIN A  (Progressing)       Start:  05/31/24    Expected End:  06/14/24             STG - Pt will transition supine <> sitting with MIN A  (Progressing)       Start:  05/31/24    Expected End:  06/14/24            STG - Pt will transfer STS with MIN A   (Progressing)       Start:  05/31/24    Expected End:  06/14/24               Pain - Adult            Education Documentation  Mobility Training, taught by Margaux Vargas PT at 5/31/2024  4:33 PM.  Learner: Patient  Readiness: Acceptance  Method: Explanation  Response: Verbalizes Understanding    Education Comments  No comments found.

## 2024-05-31 NOTE — PROGRESS NOTES
Thoracic Surgery  Progress Note     Rodo Fam is a 89 y.o. male on day 2 of admission presenting with Shortness of breath.    Subjective     Interval HPI: Seen and assessed patient this AM while they were laying in bed.           Objective   Physical Exam  PHYSICAL EXAM:  - GENERAL: No acute distress. Elderly man in ICU bed.   - NEUROLOGIC: No focal neurological deficits.   - LUNGS: Diminished bases. Left pleural chest tube  - CARDIOVASCULAR: Irregular rate and rhythm.       Last Recorded Vitals  Vitals:    05/31/24 0700 05/31/24 0725 05/31/24 0800 05/31/24 0900   BP: 105/70  145/64 129/60   BP Location:       Patient Position:       Pulse: 110  98 107   Resp: (!) 38  (!) 31 (!) 38   Temp:   35.8 °C (96.4 °F)    TempSrc:   Temporal    SpO2: 96% 96% 99% 95%   Weight:       Height:            Intake/Output last 3 Shifts:  I/O last 3 completed shifts:  In: 3014 (38.3 mL/kg) [P.O.:950; Blood:864; IV Piggyback:1200]  Out: 2225 (28.3 mL/kg) [Urine:550 (0.2 mL/kg/hr); Chest Tube:1675]  Weight: 78.7 kg     Inpatient Medications  cefepime, 2 g, intravenous, q12h  pantoprazole, 40 mg, oral, Daily before breakfast   Or  esomeprazole, 40 mg, nasoduodenal tube, Daily before breakfast   Or  pantoprazole, 40 mg, intravenous, Daily before breakfast  insulin lispro, 0-5 Units, subcutaneous, TID  lactated Ringer's, 1,000 mL, intravenous, Once  metoprolol tartrate, 12.5 mg, oral, BID  predniSONE, 10 mg, oral, Once  [START ON 6/1/2024] predniSONE, 5 mg, oral, Daily      Continuous medications     PRN medications  PRN medications: acetaminophen, dextrose, dextrose, glucagon, glucagon, morphine, oxygen, oxygen     LDA:  Chest Tube Left 16 Fr (Active)   Placement Date/Time: 05/29/24 (c) 7327   Chest Tube Orientation: Left  Chest Tube Drain Tube Size (Fr): 16 Fr  Chest Tube Drainage System: Suction   Number of days: 0       Relevant Results  Lab Review  Results from last 7 days   Lab Units 05/31/24  0619   WBC AUTO x10*3/uL 10.1    HEMOGLOBIN g/dL 9.7*   HEMATOCRIT % 28.9*   PLATELETS AUTO x10*3/uL 100*     Results from last 7 days   Lab Units 05/31/24  0619 05/30/24  0435 05/29/24  1551 05/29/24  0850   SODIUM mmol/L 144   < > 143 144   POTASSIUM mmol/L 4.6   < > 4.4 5.2   CHLORIDE mmol/L 110*   < > 108* 105   CO2 mmol/L 23   < > 21 18*   BUN mg/dL 81*   < > 76* 92*   CREATININE mg/dL 1.42*   < > 1.45* 1.65*   CALCIUM mg/dL 9.2   < > 8.5* 9.6   PROTEIN TOTAL g/dL  --   --  5.0* 5.2*   BILIRUBIN TOTAL mg/dL  --   --   --  0.9   ALK PHOS U/L  --   --   --  96   ALT U/L  --   --   --  14   AST U/L  --   --   --  43*   GLUCOSE mg/dL 172*   < > 237* 208*    < > = values in this interval not displayed.     Results from last 7 days   Lab Units 05/31/24  0619   MAGNESIUM mg/dL 3.20*     Results from last 7 days   Lab Units 05/29/24  0916   POCT PH, ARTERIAL pH 7.48*   POCT PCO2, ARTERIAL mm Hg 20*   POCT PO2, ARTERIAL mm Hg 145*   POCT HCO3 CALCULATED, ARTERIAL mmol/L 14.9*   POCT BASE EXCESS, ARTERIAL mmol/L -7.1*                History of Present Illness:  Rodo Fam is a 89-year-old male past medical history of atrial fibrillation (on Xarelto) and HTN presenting to the [facility] ED on 5/29/24 with complaints of dyspnea.     Patient reportedly is not on nasal cannula at nursing facility was tachypneic short of breath and hypoxic at nursing facility and subsequently sent to the ER for evaluation. Patient states he is short of breath he denies any chest pain or any other muscle aches and pains at this time he denies any nausea vomiting fever chills. No other history was reported.      Radiographic imaging showed a large left pleural effusion.  The ICU team admitted him and placed a left pigtail catheter, which was notable for immediate drainage of bloody fluid.    Daily Events    5/30/24: Seen and assessed patient this AM; chest tube output has slowed after patient was administered blood products per ICU. Would  recommend maintaining L pleural chest tube to suction.     L Pleural Output: 1600mL     5/31/2024: Chest tube out decreasing, last 24 hours 245ml. Comfortable on 3 L NC.     Assessment & Plan       Left Pleural Effusion   - Patient is on Rivaroxaban 20mg every day (longstanding persistent atrial fibrillation)  --> Found to be with L sided hemothorax after 14 fr pigtail catheter placement   - Though initial output was high, it has subsequently slowed with administration of blood productions   - Given the patient's age and frailty, surgical intervention should be reserved  - Maintain chest tube to suction for now  - Follow output's & CXR   - Obviously, continue holding AC   - Further care per primary     Above patient seen and plan discussed with thoracic surgeon, Dr. Coleman Law. Plan as above. .     Dulce DWYER Head, APRN-CNP

## 2024-05-31 NOTE — PROGRESS NOTES
05/31/24 1154   Discharge Planning   Patient expects to be discharged to: Received message from palliative NP asking SW to speak with family. SW spoke with pt spouse who asked that SW call pt mary Self. ARLETH spoke with Clair and educated on palliative care services. Clair states that she has experience with [facility] and requested that a referral be sent to their palliative navigator program. ARLETH sent referral. Also discussed private duty care services. Per her request, ARLETH emailed Clair private care duty list to her at [email or web address].     ADDED 1406: Received message from [facility] palliative, stating that they would not assist with a hospital bed through their palliative program. SW called and left message for mary to make her aware.

## 2024-05-31 NOTE — PROGRESS NOTES
Rodo Fam is a 89 y.o. male on day 2 of admission presenting with Shortness of breath.      Subjective   Patient seen and examined this am. Patient now on 3L nc. Will wean prednisone. Palliative care consulted for goals of care, patient will require rehab in discharge. Medicine consulted, icu will sign off.        Objective     Last Recorded Vitals  /58   Pulse 88   Temp 36.4 °C (97.5 °F) (Temporal)   Resp 19   Wt 76.5 kg (168 lb 10.4 oz)   SpO2 100%   Intake/Output last 3 Shifts:    Intake/Output Summary (Last 24 hours) at 5/31/2024 1606  Last data filed at 5/31/2024 1500  Gross per 24 hour   Intake 2050 ml   Output 1410 ml   Net 640 ml       Admission Weight  Weight: 81.6 kg (180 lb) (05/29/24 0831)    Daily Weight  05/31/24 : 76.5 kg (168 lb 10.4 oz)    Image Results  XR chest 1 view  Narrative: STUDY:  Chest Radiograph, One View; 05/29/2024 11:16 PM   INDICATION:  Evaluate chest tube.  COMPARISON:  XR chest 05/29/2024 at 18:30, 05/06/2024.  (Images only).  ACCESSION NUMBER(S):  [id number]  ORDERING CLINICIAN:    JOSIAH DUNNE  TECHNIQUE:  Frontal chest was obtained at 23:10 hours.  FINDINGS:  CARDIOMEDIASTINAL SILHOUETTE:  Cardiomediastinal silhouette is normal in size and configuration.   Calcified plaque is seen in the aorta.     LUNGS:  Left suprahilar fibrotic changes noted.  Pleural drain is seen at the  left lung base with a small moderate left pleural effusion and  airspace disease at the left lung base.     ABDOMEN:  No remarkable upper abdominal findings.     BONES:  No acute osseous changes.  Generalized osseous demineralization is  noted.  Impression: Left basilar airspace disease with a small left effusion and pleural  drain in place, likely atelectasis although left lower lobe pneumonia  is a possibility in the appropriate clinical setting.  Stable chronic fibrotic changes in the left suprahilar region,  superimposed pneumonia is also a possibility.  Signed by Hossein EDUARDO  Billy      Physical Exam  General appearance: acute distress, ill-appearing   HEENT: Atraumatic/normocephalic, moist mucosa  Neck: supple without obvious goiter, JVD not appreciated  Respiratory: improved air movement, increased respiratory effort, bl crackles, decreased breath sounds on left improved, chest tube in placed  Cardiovascular: irregular irregular, no peripheral edema  Abdomen: no organomegaly, no tenderness to palpation in all quadrants  Extremities: strong peripheral pulses, no grossly obvious deformities   Skin: intact, no rashes   Neurologic: Alert and oriented x 2, No obvious focal deficit  Psych: appropriate mood & affect, cooperative  Relevant Results               Assessment/Plan          Principal Problem:    Shortness of breath    Neurological System:  Avoid  sedating medication  Avoid excessive caths   Maintain normal sleep/wake cycle      Cardiovascular System:  #Chronic afib on xarelto   #HTN  #AV steosis  #T2MI  Last Echo: 3/8/2024 LVEF  70% +/- 5%, Dilated left atrium, right atrium, Moderate aortic valve stenosis  Hold xarelto   Vitamin K 2x and ffp given   HR improved with fluid resuscitation, can give prn metop if needed      Respiratory System:  #Acute hypoxic respiratory failure   #HAP  #Large left pleural effusion   #Hemothorax  Nasal canula 3L, Cpap prn   Cefepime  Thoracentesis done yesterday without complications, 1.7L removed  Exudative per fluid studies   5mg prednisone, continue to wean   Evaluated by thoracic surgery, no intervention at this sandra e  Viral and resp cult pending   Encourage use if Incentive Spirometer,  q1  HOB > 30 degrees        Gastrointestinal System:  Diet: nectar thick   Prophylaxis: Protonix   No current  issues        Endocrine System:  #Hyperglycemia   No hx of diabetes, likely secondary to steroid use   SSI        Renal System:  #HERNÁN  #Lactic acidosis   #Hx siadh   Trend lactate   Will hold home urea tabs for now   Monitor I&Os  Trend renal panel and  replace lectrolytes     Hematological System:  #MGUS  #Hx Basal cell carcinoma   #Hx prostate ca  Currently follow with hemonc as outpatient  Previously seen for 11th rib lytic lesion, recommended skeletal survey  Had prostatectomy around 2010  Has never received any radiation or chemo   H&H trend, s/p 1U prbc  DVT ppx with SCDs        Infection Disease System:  #HAP  Cefepime 5/29-  Blood cultures pending  Viral and sputum cult sent  UA negative  Follow up with cultures and adjust abx as appropriate   Monitor for s&s of infection     Vascular system & Extremities:  KASEY Leon, DO

## 2024-05-31 NOTE — PROGRESS NOTES
Occupational Therapy    Evaluation    Patient Name: Rodo Fam  MRN: [id number]  Today's Date: 5/31/2024  Time Calculation  Start Time: 0945  Stop Time: 1008  Time Calculation (min): 23 min        Assessment:  End of Session Communication: Bedside nurse  End of Session Patient Position: Bed, 4 rail up, Alarm off, not on at start of session     Plan:  Treatment Interventions: ADL retraining, Functional transfer training, UE strengthening/ROM, Endurance training, Compensatory technique education  OT Frequency: 3 times per week  OT Discharge Recommendations: Moderate intensity level of continued care  OT - OK to Discharge: Yes (to next level of care when cleared by medical team)  Treatment Interventions: ADL retraining, Functional transfer training, UE strengthening/ROM, Endurance training, Compensatory technique education    Subjective   Current Problem:  1. Shortness of breath  Chest Tube Insertion    Chest Tube Insertion      2. Pneumonia due to infectious organism, unspecified laterality, unspecified part of lung  Chest Tube Insertion    Chest Tube Insertion      3. Sepsis, due to unspecified organism, unspecified whether acute organ dysfunction present (Multi)        4. Hemothorax on left  Chest Tube Insertion    Chest Tube Insertion      5. Acute hypoxic respiratory failure (Multi)          General:  General  Reason for Referral: impaired adl; pt. admitted with sob, pneumonia, sepsis, ct chest:  (-) pe, larg L pleural effusion, atelectasis, pneumonia, lytic lesion, 11th rib (pt. followed by heme-onc), thoracic surgery consult:  chest tube for now, lactate 3.7 downtrending  Past Medical History Relevant to Rehab: pt. had recent admit 5/6/24 s/p fall, a fib on Xarelto, mgus, htn, hld, aortic valve stenosis, skin cancer and prostate cancer  Prior to Session Communication: Bedside nurse  Patient Position Received: Bed, 4 rail up, Alarm off, not on at start of session  General Comment: pt. agreeable to therapy  intervention  Precautions:  Precautions Comment: o2 3 L/min, telemetry, external catheter, chest tube x 1  Vital Signs:  Heart Rate:  (VSS)  Pain:       Objective   Cognition:  Overall Cognitive Status:  (pt. soft spoken/weak vocalizations)  Orientation Level: Oriented X4           Home Living:  Home Living Comments: pt. admitted from SNF, prior to May 2024, pt. home with spouse, in a house 3 step entry with rail, full bath/bed 2nd floor with rail, 1/2 bath 1st floor, basement laundry and rec room, tub/shower, st. toilet, owns st. walker  Prior Function:  Prior Function Comments: prior to early May 2024, pt. able to complete adl tasks, spouse was doing iadl tasks, no use of device for ambulation and was driving.  since early May 2024 hospitalization pt. discharged to SNF where he was working with therapies, not ambulating,  standing with parallel bars  IADL History:     ADL:  ADL Comments: estimate dependent with LB dress/bathe, toileting max assist for UB bathe/dress/groom/feeding  Activity Tolerance:  Early Mobility/Exercise Safety Screen: Proceed with mobilization - No exclusion criteria met  Bed Mobility/Transfers: Bed Mobility  Bed Mobility:  (max assist x 2 supine <> sit)    Transfers  Transfer:  (sit<> stand from eob completed x 2 trials, mod assist x 2, bilateral knees bucklingf)      Functional Mobility:  Functional Mobility  Functional Mobility Performed:  (unable to attempt due to weakness)     Strength:  Strength Comments: bue's distally at least 3/5    Outcome Measures:[facility] Daily Activity  Putting on and taking off regular lower body clothing: Total  Bathing (including washing, rinsing, drying): Total  Putting on and taking off regular upper body clothing: A lot  Toileting, which includes using toilet, bedpan or urinal: Total  Taking care of personal grooming such as brushing teeth: A lot  Eating Meals: A lot  Daily Activity - Total Score: 9         and ICU Mobility Screen  Early Mobility/Exercise  Safety Screen: Proceed with mobilization - No exclusion criteria met  ICU Mobility Scale: Standing    Education Documentation  ADL Training, taught by Denise Rivera OT at 5/31/2024  3:49 PM.  Learner: Patient  Readiness: Acceptance  Method: Explanation  Response: Verbalizes Understanding, Needs Reinforcement      Goals:  Encounter Problems       Encounter Problems (Active)       OT Goals       Increase functional mobility and  functional transfers to min assist x 1 for bed/chair/toilet with dme prn   (Progressing)       Start:  05/31/24    Expected End:  06/14/24            increase bue ther ex/activity x 5-7 minutes and increase standing tolerance x 3-5 minutes with min assist x 1 to promote greater activity tolerance for assist with adl.   (Progressing)       Start:  05/31/24    Expected End:  06/14/24            Increase grooming/ub bathing to min assist x 1 with dme prn seated eob (Progressing)       Start:  05/31/24    Expected End:  06/14/24            Increase toileting to min assist x 1 with dme prn  (Progressing)       Start:  05/31/24    Expected End:  06/14/24

## 2024-06-01 NOTE — CARE PLAN
Problem: Safety - Medical Restraint  Goal: Remains free of injury from restraints (Restraint for Interference with Medical Device)  Outcome: Progressing  Goal: Free from restraint(s) (Restraint for Interference with Medical Device)  Outcome: Progressing

## 2024-06-01 NOTE — PROGRESS NOTES
Subjective:  The patient's wife is bedside and she is concerned about how the patient is presenting today.  She was reminded that he is currently being treated for a pneumonia and still has a chest tube in place.  Still has days of his antibiotic treatment course left and his mentation may wax and wane during this treatment course.  The patient still has severe hoarseness of voice and is difficult to understand.  He is frustrated with his lack of ability to communicate and has written some notes to his wife on paper to express his desire to not return to the same skilled nursing facility that he went to before.  He was reassured that there are other options that we can choose from and that his wife will have a list of choices besides when he went to before.     Objective:    Physical Exam  Constitutional: No respiratory acute distress, cooperative, confused, elderly male  Eyes: EOMI, clear sclera  ENMT: mucous membranes dry  Head/Neck: Neck supple, Trachea midline  Respiratory/Thorax: CTAB, no wheezes, rhonchi at the left lung base  Cardiovascular: irregularly irregular, distal pulses 2+, no edema  Gastrointestinal: non tender, no palpable masses  Musculoskeletal: ROM intact, no gross deformity, thin  Neurological: No focal deficits, normal sensation  Psychological: Appropriate mood and behavior, confused  Skin: Warm and dry, CT with minimal output     Last Recorded Vitals  /57   Pulse 102   Temp 36.3 °C (97.3 °F) (Temporal)   Resp (!) 39   Wt 76.5 kg (168 lb 10.4 oz)   SpO2 96%     Relevant Results  Scheduled medications  cefepime, 2 g, intravenous, q12h  pantoprazole, 40 mg, oral, Daily before breakfast   Or  esomeprazole, 40 mg, nasoduodenal tube, Daily before breakfast   Or  pantoprazole, 40 mg, intravenous, Daily before breakfast  insulin lispro, 0-5 Units, subcutaneous, TID  metoprolol tartrate, 12.5 mg, oral, BID  predniSONE, 5 mg, oral, Daily  sodium chloride, 3 mL, nebulization, q6h  LUIS        Continuous medications     PRN medications  PRN medications: acetaminophen, albuterol, dextrose, dextrose, glucagon, glucagon, morphine, oxygen, oxygen  Scheduled medications  cefepime, 2 g, intravenous, q12h  pantoprazole, 40 mg, oral, Daily before breakfast   Or  esomeprazole, 40 mg, nasoduodenal tube, Daily before breakfast   Or  pantoprazole, 40 mg, intravenous, Daily before breakfast  insulin lispro, 0-5 Units, subcutaneous, TID  metoprolol tartrate, 12.5 mg, oral, BID  predniSONE, 5 mg, oral, Daily  sodium chloride, 3 mL, nebulization, q6h LUIS      Continuous medications     PRN medications  PRN medications: acetaminophen, albuterol, dextrose, dextrose, glucagon, glucagon, morphine, oxygen, oxygen   Results for orders placed or performed during the hospital encounter of 05/29/24 (from the past 24 hour(s))   POCT GLUCOSE   Result Value Ref Range    POCT Glucose 157 (H) 74 - 99 mg/dL   Lactate   Result Value Ref Range    Lactate 3.6 (H) 0.4 - 2.0 mmol/L   Lactate   Result Value Ref Range    Lactate 3.6 (H) 0.4 - 2.0 mmol/L   Lactate   Result Value Ref Range    Lactate 3.3 (H) 0.4 - 2.0 mmol/L   CBC   Result Value Ref Range    WBC 10.7 4.4 - 11.3 x10*3/uL    nRBC 7.4 (H) 0.0 - 0.0 /100 WBCs    RBC 2.81 (L) 4.50 - 5.90 x10*6/uL    Hemoglobin 8.9 (L) 13.5 - 17.5 g/dL    Hematocrit 28.1 (L) 41.0 - 52.0 %     80 - 100 fL    MCH 31.7 26.0 - 34.0 pg    MCHC 31.7 (L) 32.0 - 36.0 g/dL    RDW 14.7 (H) 11.5 - 14.5 %    Platelets 91 (L) 150 - 450 x10*3/uL   Renal Function Panel   Result Value Ref Range    Glucose 187 (H) 74 - 99 mg/dL    Sodium 146 (H) 136 - 145 mmol/L    Potassium 4.7 3.5 - 5.3 mmol/L    Chloride 112 (H) 98 - 107 mmol/L    Bicarbonate 23 21 - 32 mmol/L    Anion Gap 16 10 - 20 mmol/L    Urea Nitrogen 75 (H) 6 - 23 mg/dL    Creatinine 1.21 0.50 - 1.30 mg/dL    eGFR 57 (L) >60 mL/min/1.73m*2    Calcium 9.3 8.6 - 10.3 mg/dL    Phosphorus 3.9 2.5 - 4.9 mg/dL    Albumin 2.7 (L) 3.4 - 5.0 g/dL    Magnesium   Result Value Ref Range    Magnesium 3.23 (H) 1.60 - 2.40 mg/dL   POCT GLUCOSE   Result Value Ref Range    POCT Glucose 174 (H) 74 - 99 mg/dL   Lactate   Result Value Ref Range    Lactate 3.4 (H) 0.4 - 2.0 mmol/L     XR chest 1 view    Result Date: 6/1/2024  Interpreted By:  Mariel Park, STUDY: XR CHEST 1 VIEW;  6/1/2024 1:38 pm   INDICATION: Signs/Symptoms:Follow up Effusion.   COMPARISON: 05/31/2024   ACCESSION NUMBER(S): [id number]   ORDERING CLINICIAN: ELISA IVEY   FINDINGS: The patient is markedly. The heart may be normal in size.   There is left-sided pleural and parenchymal disease. There is a left-sided chest tube. There is parenchymal infiltrate at the right base.   There are atherosclerotic changes of the aorta. The bones are not well seen.   COMPARISON OF FINDING: The chest is similar.       Bilateral parenchymal infiltration. Left pleural effusion.   MACRO: none   Signed by: Mariel Park 6/1/2024 2:20 PM Dictation workstation:   [id number]    XR chest 1 view    Result Date: 6/1/2024  Interpreted By:  Chanelle Garcia, STUDY: XR CHEST 1 VIEW;  5/31/2024 1:51 pm   INDICATION: Signs/Symptoms:monitor chest tube.   COMPARISON: 05/29/2024   ACCESSION NUMBER(S): [id number]   ORDERING CLINICIAN: JOSIAH DUNNE   TECHNIQUE: Portable AP upright   FINDINGS: Pigtail catheter is present lower left hemithorax and appears grossly unchanged in position. Allowing for slightly different positioning, the left pleural fluid collection appears grossly unchanged. There is atelectasis in the left lung which appears slightly worse than the prior exam. There is no pneumothorax identified. Right lung shows no interval infiltrate. No interval changes noted in the size of the heart. The aorta is atherosclerotic. Osseous structures appear unchanged.       Left pleural fluid collection and left pleural catheter appear unchanged but atelectasis in the left lung appears increased   MACRO: None.   Signed by: Chanelle  Pretorius 6/1/2024 9:49 AM Dictation workstation:   [id number]    XR chest 1 view    Result Date: 5/30/2024  STUDY: Chest Radiograph, One View; 05/29/2024 11:16 PM INDICATION: Evaluate chest tube. COMPARISON: XR chest 05/29/2024 at 18:30, 05/06/2024.  (Images only). ACCESSION NUMBER(S): [id number] ORDERING CLINICIAN:  JOSIAH DUNNE TECHNIQUE:  Frontal chest was obtained at 23:10 hours. FINDINGS: CARDIOMEDIASTINAL SILHOUETTE: Cardiomediastinal silhouette is normal in size and configuration. Calcified plaque is seen in the aorta.  LUNGS: Left suprahilar fibrotic changes noted.  Pleural drain is seen at the left lung base with a small moderate left pleural effusion and airspace disease at the left lung base.  ABDOMEN: No remarkable upper abdominal findings.  BONES: No acute osseous changes.  Generalized osseous demineralization is noted.    Left basilar airspace disease with a small left effusion and pleural drain in place, likely atelectasis although left lower lobe pneumonia is a possibility in the appropriate clinical setting. Stable chronic fibrotic changes in the left suprahilar region, superimposed pneumonia is also a possibility. Signed by Hossein Cervantes    XR chest 1 view    Result Date: 5/29/2024  Interpreted By:  Milton Brown, STUDY: XR CHEST 1 VIEW;  5/29/2024 6:30 pm   INDICATION: Signs/Symptoms:sob.   COMPARISON: 05/29/2024   ACCESSION NUMBER(S): [id number]   ORDERING CLINICIAN: JOSIAH DUNNE   FINDINGS: Interval placement of pigtail pleural drainage catheter on the left with a reduction sizeable pleural effusion. There remains dense opacity at the left lung base likely representing mixed atelectasis and pleural effusion. The right lung is relatively clear. No pneumothorax.   The cardiomediastinal silhouette and pulmonary vasculature are within normal limits.       Decreased size of left pleural effusion.   MACRO: None.   Signed by: Milton Brown 5/29/2024 7:15 PM Dictation workstation:    [id number]    ECG 12 lead    Result Date: 5/29/2024  Atrial fibrillation ST depression, probably rate related Borderline prolonged QT interval    CT angio chest for pulmonary embolism    Result Date: 5/29/2024  Interpreted By:  Milton Ohara, STUDY: CT ANGIO CHEST FOR PULMONARY EMBOLISM;  5/29/2024 9:55 am   INDICATION: 89 y/o   M with  Signs/Symptoms:Shortness of breath tachycardia auscultation clear concern for pulmonary embolism.   LIMITATIONS: Multiple images are degraded by patient motion artifact..   ACCESSION NUMBER(S): [id number]   ORDERING CLINICIAN: JAX RICE   TECHNIQUE: After the administration of intravenous nonionic contrast, thin-section arterial phase images were obtained  from the thoracic inlet down through the iliac crest. Sagittal and coronal reconstruction images were generated. Axial and coronal MIP vascular reconstruction images were also generated.   Mediastinal, lung, bone, and liver windows were reviewed. 75 ML Omnipaque 350     COMPARISON: CT scan of the chest, abdomen, and pelvis from 05/09/2024.   FINDINGS: CHEST WALL/BASE OF THE NECK: The chest wall was grossly intact. No thyromegaly or thyroid mass.   MEDIASTINUM/VALE: No gross hilar adenopathy in this exam. No axillary adenopathy. There are multiple small central mediastinal lymph nodes measuring 12 mm AP or less, compared to 16 or less previously, mildly improved. No cardiomegaly. No pericardial effusion. Moderate to advanced mural calcifications throughout the thoracic aorta. No thoracic aortic aneurysm or dissection. There is no central pulmonary artery filling defect out through each hilum. Distal to each hilum, the vessels are suboptimal due to patient respiratory motion.   LUNGS/ PLEURA/ AND TRACHEA: Previous right pleural effusion has resolved. There is now a large left pleural effusion, significantly progressed from the prior. There is prominent atelectasis throughout the left lower lobe with some involvement of the left  upper lobe and left lingula. There is underlying emphysema. Previous right upper lobe infiltrative densities have nearly completely resolved. Previous anterior left upper lobe infiltrative densities show mild improvement. There is mild infiltrative density in the mid posterolateral right lower lobe, where previously there had been some atelectasis. No masslike density in either lung. No    pneumothorax. The trachea was grossly intact.   BONES: There is slight accentuation of dorsal kyphosis and slight midthoracic spine dextroscoliosis. Moderate multilevel mid and distal thoracic spine disc space narrowing with endplate osteophytosis. Calcification of the anterior longitudinal ligament at multiple levels. No thoracic spine compression fracture. There is an old healed fracture deformity of the inferior aspect of the right scapula. Cannot accurately assess for acute rib fractures due to the patient respiratory motion with artifact. There are however several old healed bilateral rib fracture deformities. There is a destructive expansile lesion involving the posterior aspect of the left 11th rib, with surrounding soft tissue consistent with tumor. Soft tissue component measures approximately 54 x 30 mm on axial image 255, compared to 51 by 27 mm previously. There is no other such lytic lesion evident in this exam.  . Cannot accurately assess the sternum due to patient motion.   UPPER ABDOMEN: The imaged upper abdomen was grossly intact.       Mildly limited but grossly negative exam for pulmonary embolism. Please see above.   Mild nonspecific mediastinal adenopathy, mildly improved. Perhaps infectious/inflammatory.   Resolution of previous right pleural effusion. Enlargement of now large left pleural effusion. Worsening of left-sided atelectasis as described.   Significant improvement in previous bilateral pneumonias. Please see above for details.   Arthritic changes in the thoracic spine as described, unchanged. No  thoracic spine compression fracture. Multiple old healed bilateral rib fracture deformities. Old healed fracture deformity the inferior aspect of the right scapula.   Suspicious expansile lytic destructive bone lesion with associated soft tissue mass posteriorly in the left 11th rib as described. The soft tissue component is slightly greater today. Metastasis from an unknown primary versus plasmacytoma.   MACRO: None   Signed by: Milton Ohara 5/29/2024 10:28 AM Dictation workstation:   [id number]    XR chest 1 view    Result Date: 5/29/2024  Interpreted By:  Milton Ohara, STUDY: XR CHEST 1 VIEW;  5/29/2024 8:57 am   INDICATION: Signs/Symptoms:Chest Pain.   COMPARISON: Chest x-rays, most recent from 05/06/2024. CT scan chest from 05/09/2024.   ACCESSION NUMBER(S): [id number]   ORDERING CLINICIAN: JAX RICE   TECHNIQUE: Single AP portable view of the chest was obtained.   FINDINGS: MEDIASTINUM/ LUNGS/ VALE: Stable cardiomegaly. Right pleural effusion has apparently cleared. There is mild haziness in the perihilar inferior medial right lung. Previous infiltrate in the right upper lobe has resolved. There is worsening of infiltrative opacity now present throughout most of the left lung, along with worsening of left pleural effusion. Pulmonary vasculature is mildly prominent and indistinct. Stable calcification in the aorta. No pneumothorax. No tracheal deviation. No abnormal hilar fullness or gross mass on either side.   BONES: No lytic or blastic destructive bone lesion.   UPPER ABDOMEN: Grossly intact.       Cardiomegaly with mild central vascular congestion.   Progression of left pleural effusion.   Progression of pneumonia versus asymmetric edema throughout much of the left lung.   Previous right upper lobe infiltrate has resolved. Right effusion has resolved. There is mild atelectasis or infiltrate/edema at the infrahilar medial right lung base.   MACRO: None   Signed by: Milton Ohara 5/29/2024 9:26 AM  Dictation workstation:   [id number]    XR skeletal survey complete    Result Date: 5/11/2024  Interpreted By:  Bri Kim, STUDY: XR SKELETAL SURVEY COMPLETE 5/10/2024 7:09 pm   INDICATION: Signs/Symptoms:Lytic lesion involving left 11th ribs   COMPARISON: None available.   ACCESSION NUMBER(S): [id number]   ORDERING CLINICIAN: ISAK KAMARA   TECHNIQUE: Skeletal survey is performed.   FINDINGS: Osteolytic bony destruction of the left 11th rib posteriorly is seen. No additional osteolytic or osteoblastic bony lesions are identified. There is no endosteal scalloping seen within the long bones. No pathologic compression fractures of the spine are identified.   There is postoperative change from bipolar right hip arthroplasty.   Bilateral perihilar infiltrates are noted.       Solitary osteolytic lesion destroying posterior left 11th rib. 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 The possibility of a solitary osteolytic bony metastasis is not excluded however.   Signed by: Bri Kim 5/11/2024 2:01 PM Dictation workstation:   [id number]    CT chest abdomen pelvis w IV contrast    Result Date: 5/10/2024  Interpreted By:  Robert Alarcon, STUDY: CT CHEST ABDOMEN PELVIS W IV CONTRAST;  5/9/2024 6:23 pm   INDICATION: Signs/Symptoms:pain   COMPARISON: None.   ACCESSION NUMBER(S): [id number]   ORDERING CLINICIAN: ISAK KAMARA   TECHNIQUE: CT of the chest, abdomen, and pelvis was performed. Sequential transaxial images were obtained with orthogonal reconstructions. 75mL of  Omnipaque 350 was injected intravenously.   FINDINGS: CHEST:   LUNG/PLEURA/LARGE AIRWAYS: There are small to moderate bilateral pleural effusions with mild posterior basilar compressive atelectasis. Additionally there are ground-glass and partially consolidating infiltrates at the upper lobes with mild bronchiectasis, with additional small area of ground-glass attenuation at the superior segment of the left lower lobe, most likely from multifocal  pneumonia.     VESSELS: The thoracic aorta is of normal course and caliber , but with mild atherosclerotic calcification .   Main pulmonary artery and its branches are normal in caliber.   There is moderate coronary artery atherosclerotic calcification primarily involving the LAD.   HEART: The heart is normal in size. There is no pericardial effusion.   MEDIASTINUM AND VALE: There is a borderline AP window node measuring 1 cm in short axis, probably reactive. However, early malignant node is a consideration given the left 11th rib lesion described below.   The thyroid gland as visualized appears unremarkable.   CHEST WALL AND LOWER NECK: There is an expansile and lytic lesion of the left 11th rib posteriorly, measuring 4.7 x 3 cm. This would be most consistent with malignancy, probably metastatic but primary lesion such as plasmacytoma etc. Is possible. There is hypodensity measuring approximately 8 mm at the T7 and T8 endplates, probably benign notochordal remnants. However, an early lytic lesion given the left rib is also a consideration.   There is mild subcutaneous edema of the lower lateral chest walls, greater on the right.   ABDOMEN:   LIVER: The hepatic parenchyma is homogeneous without evidence of focal liver lesions.The hepatic size is normal.   SPLEEN: The spleen is normal in size and homogeneous.   ADRENAL GLANDS: Bilateral adrenal glands appear normal.   KIDNEYS AND URETERS: The renal cortices are unremarkable and the renal sizes within normal limits , with cortical cyst(s).   The distal right ureter at the pelvis is obscured due to beam hardening artifact from a right hip replacement. Otherwise no identified urinary tract dilatation or calculi.   PANCREAS: The pancreas appears unremarkable, there is no ductal dilatation or masses.   GALLBLADDER: No radiodense calculi, wall thickening or pericholecystic fluid.   BILE DUCTS: There is no biliary dilatation or filling defects.     VESSELS: There is  moderate to extensive atherosclerotic calcification of the aorta and iliac vessels, and proximal 1 cm segment of the right renal artery. There is mild atherosclerotic calcification of the proximal left renal artery. There is also moderate atherosclerotic plaque of the superior mesenteric artery proximally.   PERITONEUM AND RETROPERITONEUM: No ascites considering obscured right lower pelvis due to the right hip arthroplasty. No free intraperitoneal air.   The retroperitoneum appears unremarkable, and without significant adenopathy.   No enlarged mesenteric lymph nodes.   BOWEL: Moderate diverticulosis greatest at the distal descending and proximal sigmoid colon. Bowel at the lower pelvis is partially obscured again due to beam hardening artifact from a right hip arthroplasty. There is mild gastric distention with intraluminal content probably from recent ingestion. The bowel including the appendix otherwise is unremarkable without significant dilatation or mural thickening.   PELVIS:   BLADDER: Partially obscured, otherwise grossly unremarkable.   REPRODUCTIVE ORGANS: The pelvis, particularly right laterally is obscured by beam hardening, otherwise grossly unremarkable.     BONE, ABDOMINAL WALL AND OTHER FINDINGS: Relative sclerosis measuring 1.4 cm at the right sacral ala may be due to an enostosis, but blastic metastasis is possible.   The abdominal wall soft tissues appear normal.       CHEST 1.  Small to moderate bilateral pleural effusions with compressive posterior basilar atelectasis. There are additional infiltrates greater in the upper lobes probably from multifocal pneumonia. 2. Expansile lytic lesion the left 11th rib, probably metastasis although primary bone malignancy is possible. 3. Borderline AP window node.   ABDOMEN - PELVIS 1.  1.5 cm sclerosis at the right sacral ala, possibly blastic metastasis. If further evaluation is needed a bone scan would be recommended for skeletal survey. 2. Moderate  diverticulosis.   MACRO: 1.   Signed by: Robert Alarcon 5/10/2024 9:19 AM Dictation workstation:   [id number]    CT thoracic spine wo IV contrast    Result Date: 5/9/2024  Interpreted By:  Jc Tran, STUDY: CT THORACIC SPINE WO IV CONTRAST;  5/8/2024 3:55 pm   INDICATION: Signs/Symptoms:pain.   COMPARISON: None.   ACCESSION NUMBER(S): [id number]   ORDERING CLINICIAN: ISAK KAMARA   TECHNIQUE: Thin cut axial CT images through the thoracic spine were obtained and reconstructed in the coronal and sagittal planes.   FINDINGS: Counting from below, the caudal most ribs are noted to be small and for the sake of numbering purposes will be labeled as the 12th ribs.   There is a lytic expansile osseous lesion involving the  posterior left 11th rib with surrounding intermediate attenuation soft tissue fullness most concerning for underlying neoplasm/metastatic disease.   There are small vague lucencies within the inferior T7 and superior T8 vertebrae as seen on sagittal reconstructed slice 54 of 101 which could be benign or malignant in etiology.   There is exaggeration of the kyphotic curvature of the thoracic spine. There is 2 mm of anterolisthesis of T1 on T2.   No acute fracture is noted.   There is bony ankylosis of mid and lower thoracic vertebrae.   There is spondylosis noted throughout the thoracic as well as the partially imaged upper lumbar and lower cervical regions.   There are bilateral pleural effusions. There are scattered somewhat ill-defined areas of opacity/airspace disease within the lungs bilaterally. Correlation with a dedicated CT of the chest would be recommended.         Counting from below, the caudal most ribs are noted to be small and for the sake of numbering purposes will be labeled as the 12th ribs.   There is a lytic expansile osseous lesion involving the  posterior left 11th rib with surrounding intermediate attenuation soft tissue fullness most concerning for underlying  neoplasm/metastatic disease.   There are small vague lucencies within the inferior T7 and superior T8 vertebrae as seen on sagittal reconstructed slice 54 of 101 which could be benign or malignant in etiology.   There is exaggeration of the kyphotic curvature of the thoracic spine. There is 2 mm of anterolisthesis of T1 on T2.   No acute fracture is noted.   There is bony ankylosis of mid and lower thoracic vertebrae.   There is spondylosis noted throughout the thoracic as well as the partially imaged upper lumbar and lower cervical regions.   There are bilateral pleural effusions. There are scattered somewhat ill-defined areas of opacity/airspace disease within the lungs bilaterally. Correlation with a dedicated CT of the chest would be recommended.   MACRO: None   Signed by: Jc Tran 5/9/2024 5:53 AM Dictation workstation:   [id number]    XR hip right with pelvis when performed 2 or 3 views    Result Date: 5/6/2024  Interpreted By:  Finkelstein, Evan, STUDY: XR HIP RIGHT WITH PELVIS WHEN PERFORMED 2 OR 3 VIEWS;  5/6/2024 4:51 am three views right hip. AP view of the pelvis   INDICATION: Signs/Symptoms:fall, right lateral hip pain.   COMPARISON: Pelvic radiograph 04/30/2017   ACCESSION NUMBER(S): [id number]   ORDERING CLINICIAN: ARNAV BURCIAGA   FINDINGS: Right hip arthroplasty hardware is present and appears intact. No surrounding lucency or periprosthetic fracture. Bones are demineralized. There are vascular calcifications. No acute displaced fracture or dislocation of the pelvis. No widening of the pubic symphysis. Degenerative changes in the visualized lower lumbosacral spine.       Right hip arthroplasty hardware present without surrounding lucency or periprosthetic fracture. No acute displaced fracture or dislocation of the pelvis.     MACRO: None.   Signed by: Evan Finkelstein 5/6/2024 6:00 AM Dictation workstation:   [id number]    XR chest 1 view    Result Date: 5/6/2024  Interpreted By:   Finkelstein, Evan, STUDY: XR CHEST 1 VIEW;  5/6/2024 4:31 am   INDICATION: Signs/Symptoms:Trauma.   COMPARISON: Chest radiograph 04/30/2017   ACCESSION NUMBER(S): [id number]   ORDERING CLINICIAN: ARNAV BURCIAGA   FINDINGS:     CARDIOMEDIASTINAL SILHOUETTE: Enlarged cardiac silhouette, similar compared to prior imaging. Calcifications overlie the aortic arch.   LUNGS: Patchy airspace opacities overlying the upper aspect of the lungs bilaterally. No pneumothorax   ABDOMEN: No remarkable upper abdominal findings.   BONES: There are irregularities along the lateral aspect of multiple left-sided ribs, which appear similar compared to prior imaging.       Patchy airspace opacities overlying the upper aspect of the lungs bilaterally. Findings may represent pulmonary contusions in the setting of trauma. Recommend follow-up to resolution. Irregularities along the lateral aspect of multiple left-sided ribs, which overall appears similar compared to prior imaging.   MACRO: None.   Signed by: Evan Finkelstein 5/6/2024 5:58 AM Dictation workstation:   [id number]    CT head W O contrast trauma protocol    Result Date: 5/6/2024  Interpreted By:  Finkelstein, Evan, STUDY: CT HEAD W/O CONTRAST TRAUMA PROTOCOL; CT CERVICAL SPINE WO IV CONTRAST;  5/6/2024 4:22 am   INDICATION: Signs/Symptoms:fall on thinners.   COMPARISON: CT brain 04/30/2017   ACCESSION NUMBER(S): [id number]; [id number]   ORDERING CLINICIAN: ARNAV BURCIAGA   TECHNIQUE: Noncontrast CT images of the head with coronal and sagittal reconstructions. Axial noncontrast CT images of the cervical spine with coronal and sagittal reconstructed images.   FINDINGS: EXTRACRANIAL SOFT TISSUES: Unremarkable.   CALVARIUM: No depressed skull fracture. No destructive osseous lesion.   PARANASAL SINUSES/MASTOIDS: The visualized paranasal sinuses and mastoid air cells are aerated.   HEMORRHAGE: No acute intracranial hemorrhage.   BRAIN PARENCHYMA: Gray-white matter interfaces are  preserved. No mass effect or midline shift. There are nonspecific scattered white matter hypodensities.   VENTRICLES and EXTRA-AXIAL SPACES: Parenchymal atrophy with prominence of the ventricles and cortical sulci.   OTHER FINDINGS: There are calcifications within the cavernous carotids   CERVICAL SPINE:   ALIGNMENT: No traumatic malalignment VERTEBRAE: No acute loss of vertebral body height. Bones are demineralized DISC SPACE: Bony fusion across the C5/C6 disc space. SPINAL CANAL: Multilevel facet and uncovertebral arthropathy with varying degrees of neural foraminal stenosis. No severe central narrowing. PREVERTEBRAL SOFT TISSUES: No prevertebral soft tissue swelling. LUNG APICES: Imaged portion of the lung apices are within normal limits.   OTHER FINDINGS: None.         No acute intracranial hemorrhage, mass effect or midline shift.   Nonspecific scattered white matter hypodensities favored to represent sequela of small vessel ischemia. Cervical spondylosis without acute loss of vertebral body height or traumatic malalignment.     MACRO: None.   Signed by: Evan Finkelstein 5/6/2024 4:43 AM Dictation workstation:   [id number]    CT cervical spine wo IV contrast    Result Date: 5/6/2024  Interpreted By:  Finkelstein, Evan, STUDY: CT HEAD W/O CONTRAST TRAUMA PROTOCOL; CT CERVICAL SPINE WO IV CONTRAST;  5/6/2024 4:22 am   INDICATION: Signs/Symptoms:fall on thinners.   COMPARISON: CT brain 04/30/2017   ACCESSION NUMBER(S): [id number]; [id number]   ORDERING CLINICIAN: ARNAV BURCIAGA   TECHNIQUE: Noncontrast CT images of the head with coronal and sagittal reconstructions. Axial noncontrast CT images of the cervical spine with coronal and sagittal reconstructed images.   FINDINGS: EXTRACRANIAL SOFT TISSUES: Unremarkable.   CALVARIUM: No depressed skull fracture. No destructive osseous lesion.   PARANASAL SINUSES/MASTOIDS: The visualized paranasal sinuses and mastoid air cells are aerated.   HEMORRHAGE: No acute  intracranial hemorrhage.   BRAIN PARENCHYMA: Gray-white matter interfaces are preserved. No mass effect or midline shift. There are nonspecific scattered white matter hypodensities.   VENTRICLES and EXTRA-AXIAL SPACES: Parenchymal atrophy with prominence of the ventricles and cortical sulci.   OTHER FINDINGS: There are calcifications within the cavernous carotids   CERVICAL SPINE:   ALIGNMENT: No traumatic malalignment VERTEBRAE: No acute loss of vertebral body height. Bones are demineralized DISC SPACE: Bony fusion across the C5/C6 disc space. SPINAL CANAL: Multilevel facet and uncovertebral arthropathy with varying degrees of neural foraminal stenosis. No severe central narrowing. PREVERTEBRAL SOFT TISSUES: No prevertebral soft tissue swelling. LUNG APICES: Imaged portion of the lung apices are within normal limits.   OTHER FINDINGS: None.         No acute intracranial hemorrhage, mass effect or midline shift.   Nonspecific scattered white matter hypodensities favored to represent sequela of small vessel ischemia. Cervical spondylosis without acute loss of vertebral body height or traumatic malalignment.     MACRO: None.   Signed by: Evan Finkelstein 5/6/2024 4:43 AM Dictation workstation:   [id number]         Assessment & Plan:  Rodo Fam is a 89 y.o. male with past medical history of hypertension, A-fib Xarelto, moderate AV stenosis, MGUS, prostate cancer, malignant melanoma, basal cell carcinoma, SIADH who presented to the emergency department on 5-29 shortness of breath.      Acute hypoxic respiratory failure, improving  Hospital-acquired pneumonia   Large left-sided pleural effusion  Atelectasis  Acute metabolic encephalopathy, waxing and waning  Acute on chronic anemia secondary to hemothorax, improving  -Currently on several liters nasal cannula.  On CPAP and nasal cannula.  May benefit from AVAPS or incentive spirometry.  Will defer to respiratory therapy as they continue bronchopulmonary hygiene.    -Pneumonia workup, negative strep and Legionella and MRSA.  Vancomycin discontinued Mycoplasma pending.  Continuing cefepime and azithromycin day 3 of antibiotic treatment.    -Chest tube in place with significant drainage, appreciate CT surgery recommendations.  Tube is currently to suction, decreasing daily output.  Hemothorax was treated with FFP and vitamin K as reversal agents and the patient responded appropriately. Chest x-rays as needed.  Hoarseness 2/2 pharyngitis, suspect viral  -severe hoarseness and difficult to understand speech. Continue supportive care as there are not suspicious GAS features indicating need for NAAT testing  Dysphagia  -SLP reevaluated patient, recommends holding diet until mentation improves. Will continue to reevalaute throughout the day.   HERNÁN, improving  Lactic acidosis, improving  -Continue to monitor renal function panel, fluid balance.  Creatinine fluctuating between 1.65 and 1.4 to, overall downtrend since admission.  Baseline seems close to 0 .7, 0 .8  -Trend lactate level.  Chronic A-fib, on anticoagulation  Moderate aortic valve stenosis  Elevated troponin, Type 2 MI, demand ischemia, from above  -Holding home Xarelto given hemothorax and current chest tube  -Telemetry monitoring  Lytic lesion of rib  Hx of MGUS  -will need OP follow up with Hem onc +/- bone marrow biopsy to evlulate fro progression of MGUS to MM.    Diet: Varies pending mentation between NPO and Puréed diet with moderately thick liquids.  Meds crushed in purée.  Spoon feeds only.  Feeding one-to-one  DVT ppx: SCDs  IVF: As needed  Consults: CT surgery  Dispo: General medicine, telemetry.    Plans to eventually return to skilled nursing facility with palliative care     I have reviewed and interpreted all lab test imaging studies and documentations from other healthcare providers. Case to be discussed with attending, A&P above reflect tentative plan. Please await for final signature from attending  physician on service.     Scarlet Rodríguez MD PGY-2  Please message me if you have any questions

## 2024-06-01 NOTE — SIGNIFICANT EVENT
Pt continuing to pull off CPAP mask, tachypnea to 45 bpm, tachycardia, and restlessness.   Contacted RT, put back on 3L NC

## 2024-06-01 NOTE — PROGRESS NOTES
Thoracic Surgery  Progress Note     Rodo Fam is a 89 y.o. male on day 3 of admission presenting with Shortness of breath.    Subjective     Interval HPI: Seen and assessed patient this AM while they were laying in bed.           Objective   Physical Exam  PHYSICAL EXAM:  - GENERAL: No acute distress. Elderly man in ICU bed.   - NEUROLOGIC: No focal neurological deficits.   - LUNGS: Diminished bases. Left pleural chest tube  - CARDIOVASCULAR: Irregular rate and rhythm.       Last Recorded Vitals  Vitals:    06/01/24 0700 06/01/24 0800 06/01/24 0900 06/01/24 1000   BP: 94/53 109/56 118/56 109/53   BP Location:       Patient Position:       Pulse: 95 100 96 94   Resp: (!) 47 (!) 41 25 (!) 33   Temp:  36.6 °C (97.9 °F)     TempSrc:  Temporal     SpO2: 97% 94% 100% 100%   Weight:       Height:            Intake/Output last 3 Shifts:  I/O last 3 completed shifts:  In: 2650 (34.6 mL/kg) [P.O.:1350; IV Piggyback:1300]  Out: 2020 (26.4 mL/kg) [Urine:1900 (0.7 mL/kg/hr); Chest Tube:120]  Weight: 76.5 kg     Inpatient Medications  cefepime, 2 g, intravenous, q12h  pantoprazole, 40 mg, oral, Daily before breakfast   Or  esomeprazole, 40 mg, nasoduodenal tube, Daily before breakfast   Or  pantoprazole, 40 mg, intravenous, Daily before breakfast  insulin lispro, 0-5 Units, subcutaneous, TID  metoprolol tartrate, 12.5 mg, oral, BID  predniSONE, 5 mg, oral, Daily  sodium chloride, 3 mL, nebulization, q6h LUIS      Continuous medications     PRN medications  PRN medications: acetaminophen, albuterol, dextrose, dextrose, glucagon, glucagon, morphine, oxygen, oxygen     LDA:  Chest Tube Left 16 Fr (Active)   Placement Date/Time: 05/29/24 (c) 6777   Chest Tube Orientation: Left  Chest Tube Drain Tube Size (Fr): 16 Fr  Chest Tube Drainage System: Suction   Number of days: 0       Relevant Results  Lab Review  Results from last 7 days   Lab Units 06/01/24  0528   WBC AUTO x10*3/uL 10.7   HEMOGLOBIN g/dL 8.9*   HEMATOCRIT % 28.1*    PLATELETS AUTO x10*3/uL 91*     Results from last 7 days   Lab Units 06/01/24  0528 05/30/24  0435 05/29/24  1551 05/29/24  0850   SODIUM mmol/L 146*   < > 143 144   POTASSIUM mmol/L 4.7   < > 4.4 5.2   CHLORIDE mmol/L 112*   < > 108* 105   CO2 mmol/L 23   < > 21 18*   BUN mg/dL 75*   < > 76* 92*   CREATININE mg/dL 1.21   < > 1.45* 1.65*   CALCIUM mg/dL 9.3   < > 8.5* 9.6   PROTEIN TOTAL g/dL  --   --  5.0* 5.2*   BILIRUBIN TOTAL mg/dL  --   --   --  0.9   ALK PHOS U/L  --   --   --  96   ALT U/L  --   --   --  14   AST U/L  --   --   --  43*   GLUCOSE mg/dL 187*   < > 237* 208*    < > = values in this interval not displayed.     Results from last 7 days   Lab Units 06/01/24  0528   MAGNESIUM mg/dL 3.23*     Results from last 7 days   Lab Units 05/29/24  0916   POCT PH, ARTERIAL pH 7.48*   POCT PCO2, ARTERIAL mm Hg 20*   POCT PO2, ARTERIAL mm Hg 145*   POCT HCO3 CALCULATED, ARTERIAL mmol/L 14.9*   POCT BASE EXCESS, ARTERIAL mmol/L -7.1*                History of Present Illness:  Rodo Fam is a 89-year-old male past medical history of atrial fibrillation (on Xarelto) and HTN presenting to the [facility] ED on 5/29/24 with complaints of dyspnea.     Patient reportedly is not on nasal cannula at nursing facility was tachypneic short of breath and hypoxic at nursing facility and subsequently sent to the ER for evaluation. Patient states he is short of breath he denies any chest pain or any other muscle aches and pains at this time he denies any nausea vomiting fever chills. No other history was reported.      Radiographic imaging showed a large left pleural effusion.  The ICU team admitted him and placed a left pigtail catheter, which was notable for immediate drainage of bloody fluid.    Daily Events    5/30/24: Seen and assessed patient this AM; chest tube output has slowed after patient was administered blood products per ICU. Would recommend maintaining L pleural chest tube to  suction.     L Pleural Output: 1600mL     5/31/2024: Chest tube out decreasing, last 24 hours 245ml. Comfortable on 3 L NC.     6/1/24: Chest tube with trace output over the last 24 hours; this AM, the chest tube as interrogated and there was no further ouptut. Plan for removal. Will follow up with CXR tomorrow.     Assessment & Plan       Left Pleural Effusion   - Patient is on Rivaroxaban 20mg every day (longstanding persistent atrial fibrillation)  --> Found to be with L sided hemothorax after 14 fr pigtail catheter placement   - Though initial output was high, it has subsequently slowed with administration of blood productions   - Given the patient's age and frailty, surgical intervention should be reserved  - Removing chest tube today   - Follow up CXR tomorrow AM  - Obviously, continue holding AC   - Further care per primary     Above patient seen and plan discussed with thoracic surgeon, Dr. Coleman Law. Plan as above. .     Heron Lerma, APRN-CNP

## 2024-06-01 NOTE — PROGRESS NOTES
Speech-Language Pathology    SLP Adult Inpatient  Speech-Language Pathology Treatment     Patient Name: Rodo Fam  MRN: [id number]  Today's Date: 6/1/2024            Current Problem:   1. Shortness of breath  Chest Tube Insertion    Chest Tube Insertion      2. Pneumonia due to infectious organism, unspecified laterality, unspecified part of lung  Chest Tube Insertion    Chest Tube Insertion      3. Sepsis, due to unspecified organism, unspecified whether acute organ dysfunction present (Multi)        4. Hemothorax on left  Chest Tube Insertion    Chest Tube Insertion      5. Acute hypoxic respiratory failure (Multi)          SLP Assessment:  Prognosis: Guarded  Treatment Provided: Yes, reassessed tolerance for recommended pureed solids/moderately thick liquids. Patient seeming drowsy this morning. RR still elevated. Patient with eyes open but not engaged. Mouth open posture at rest. Patient readily accepts PO trials of moderately thick juice x3 and x2 small bites of crushed pills in puree (all via tsp) but increased A-P transit time and swallow response. Swallow onset is moderately delayed but eventually is achieved and felt via palpation. No overt s/s of aspiration on most heightened diet textures, however, diet tolerance should be carefully monitored. Advised RN that breakfast meal should be held d/t patient too drowsy and swallow delayed to safely consume any more at this time.   Medical Staff Made Aware: Yes  Barriers: Comorbidities    Recommendations:  -Continue current diet of pureed solids/moderately thick liquids.  -If patient demonstrates overt s/s of aspiration on modified diet textures, please make patient NPO.  -SLP will continue to follow along with patient's medical course.      Plan:  SLP TX Plan: Continue Plan of Care  SLP Discharge Recommendations: Continue skilled speech therapy services post discharge  Discussed POC: Nursing    Dysphagia goals: start date 5/31/24  Pt will tolerate prescribed  diet of puree and moderately thick liquids without overt s/s aspiration. GOAL ONGOING 6/1  Ongoing diet/meal analysis. GOAL ONGOING 6/1; do not advise any advanced PO trials at this time.  Pt/family education. GOAL ONGOING 6/1; no family at bedside. Discussed session with RN. Patient is too drowsy this morning.     Subjective   Current Problem:  Dysphagia    Most Recent Visit:  SLP Received On: 06/01/24    General Visit Information:   Prior to Session Communication: Bedside nurse      Pain Assessment:   Pain Score: 0 - No pain      Objective       Therapeutic Swallow:  Solid Diet Recommendations: Pureed/extremely thick  (IDDSI Level 4)  Liquid Diet Recommendations: Honey thick/liquidised/moderately thick (IDDS Level 3)      Inpatient:  Education Comments  Results/recommendations from swallowing reassessment were reviewed with nursing, who voiced understanding.     Consultations/Referrals/Coordination of Services: Would advise considering alternative means of nutrition/nutrition d/t anticipate PO intake will not be enough to meet needs at this time. Will continue to closely monitor PO tolerance.

## 2024-06-02 NOTE — PROGRESS NOTES
Thoracic Surgery  Progress Note     Rodo Fam is a 89 y.o. male on day 4 of admission presenting with Shortness of breath.    Subjective     Interval HPI: Seen and assessed patient this AM while they were laying in bed.           Objective   Physical Exam  PHYSICAL EXAM:  - GENERAL: No acute distress. Elderly man in ICU bed.   - NEUROLOGIC: No focal neurological deficits.   - LUNGS: Diminished bases. Left pleural chest tube  - CARDIOVASCULAR: Irregular rate and rhythm.       Last Recorded Vitals  Vitals:    06/02/24 1000 06/02/24 1100 06/02/24 1200 06/02/24 1205   BP: 115/56 110/55 78/58 117/55   BP Location:   Right arm    Patient Position:   Lying    Pulse: (!) 121 104 (!) 112 105   Resp: (!) 41 25 (!) 38 (!) 36   Temp:       TempSrc:       SpO2: 95% 99% 92% 93%   Weight:       Height:            Intake/Output last 3 Shifts:  I/O last 3 completed shifts:  In: 750 (9.8 mL/kg) [P.O.:450; IV Piggyback:300]  Out: 1870 (24.4 mL/kg) [Urine:1850 (0.7 mL/kg/hr); Chest Tube:20]  Weight: 76.5 kg     Inpatient Medications  cefepime, 2 g, intravenous, q12h  pantoprazole, 40 mg, oral, Daily before breakfast   Or  esomeprazole, 40 mg, nasoduodenal tube, Daily before breakfast   Or  pantoprazole, 40 mg, intravenous, Daily before breakfast  insulin lispro, 0-5 Units, subcutaneous, TID  metoprolol tartrate, 12.5 mg, oral, BID  predniSONE, 5 mg, oral, Daily  sodium chloride, 3 mL, nebulization, q6h LUIS      Continuous medications  dextrose 5%-0.45 % sodium chloride, 100 mL/hr, Last Rate: 100 mL/hr (06/02/24 1004)      PRN medications  PRN medications: acetaminophen, albuterol, dextrose, dextrose, glucagon, glucagon, lidocaine-diphenhydraMINE-Maalox 1:1:1, metoprolol, morphine, oxygen, oxygen     LDA:  Chest Tube Left 16 Fr (Active)   Placement Date/Time: 05/29/24 (c) 0589   Chest Tube Orientation: Left  Chest Tube Drain Tube Size (Fr): 16 Fr  Chest Tube Drainage System: Suction   Number of days: 0       Relevant Results  Lab  Review  Results from last 7 days   Lab Units 06/02/24  0451   WBC AUTO x10*3/uL 12.9*   HEMOGLOBIN g/dL 9.3*   HEMATOCRIT % 29.3*   PLATELETS AUTO x10*3/uL 103*     Results from last 7 days   Lab Units 06/02/24  0451 05/30/24  0435 05/29/24  1551 05/29/24  0850   SODIUM mmol/L 149*   < > 143 144   POTASSIUM mmol/L 5.3   < > 4.4 5.2   CHLORIDE mmol/L 114*   < > 108* 105   CO2 mmol/L 23   < > 21 18*   BUN mg/dL 81*   < > 76* 92*   CREATININE mg/dL 1.43*   < > 1.45* 1.65*   CALCIUM mg/dL 10.0   < > 8.5* 9.6   PROTEIN TOTAL g/dL  --   --  5.0* 5.2*   BILIRUBIN TOTAL mg/dL  --   --   --  0.9   ALK PHOS U/L  --   --   --  96   ALT U/L  --   --   --  14   AST U/L  --   --   --  43*   GLUCOSE mg/dL 197*   < > 237* 208*    < > = values in this interval not displayed.     Results from last 7 days   Lab Units 06/01/24  0528   MAGNESIUM mg/dL 3.23*     Results from last 7 days   Lab Units 05/29/24  0916   POCT PH, ARTERIAL pH 7.48*   POCT PCO2, ARTERIAL mm Hg 20*   POCT PO2, ARTERIAL mm Hg 145*   POCT HCO3 CALCULATED, ARTERIAL mmol/L 14.9*   POCT BASE EXCESS, ARTERIAL mmol/L -7.1*                History of Present Illness:  Rodo Fam is a 89-year-old male past medical history of atrial fibrillation (on Xarelto) and HTN presenting to the [facility] ED on 5/29/24 with complaints of dyspnea.     Patient reportedly is not on nasal cannula at nursing facility was tachypneic short of breath and hypoxic at nursing facility and subsequently sent to the ER for evaluation. Patient states he is short of breath he denies any chest pain or any other muscle aches and pains at this time he denies any nausea vomiting fever chills. No other history was reported.      Radiographic imaging showed a large left pleural effusion.  The ICU team admitted him and placed a left pigtail catheter, which was notable for immediate drainage of bloody fluid.    Daily Events    5/30/24: Seen and assessed patient this AM;  chest tube output has slowed after patient was administered blood products per ICU. Would recommend maintaining L pleural chest tube to suction.     L Pleural Output: 1600mL     5/31/2024: Chest tube out decreasing, last 24 hours 245ml. Comfortable on 3 L NC.     6/1/24: Chest tube with trace output over the last 24 hours; this AM, the chest tube as interrogated and there was no further ouptut. Plan for removal. Will follow up with CXR tomorrow.     6/2/24: CXR stable today s/p chest tube removal. Patient is with further decline with his cognition. Recommend goals of care discussion    Assessment & Plan       Left Pleural Effusion   - Patient is on Rivaroxaban 20mg every day (longstanding persistent atrial fibrillation)  --> Found to be with L sided hemothorax after 14 fr pigtail catheter placement   - Though initial output was high, it has subsequently slowed with administration of blood productions   - CXR stable s/p chest tube removal on 6/1/24  --> Patient is with further overall decline in cognition and functional status (no focal deficits)  ---> Given his advanced age and fraility, he is likely not a surgical candidate.   - Would recommend goals of care discussion given the patient's continued decline   - CXRs as needed  - Further care per primary     Above patient seen and plan discussed with thoracic surgeon, Dr. Coleman Law. Plan as above. Will sign off at this time. Reach out with questions.     LONNIE Meraz-CNP

## 2024-06-02 NOTE — PROGRESS NOTES
Subjective:  The patient is having labored breathing. He does not answer me when I ask him how he is feeling. Asked nurse to reach out when wife visits this pm so we can discuss the case and reassess goals of care.     Objective:    Physical Exam  Constitutional: No respiratory acute distress, cooperative, confused, elderly male  Eyes: EOMI, clear sclera  ENMT: mucous membranes dry  Head/Neck: Neck supple, Trachea midline  Respiratory/Thorax: CTAB, no wheezes, rhonchi at the left lung base  Cardiovascular: irregularly irregular, distal pulses 2+, +1 pitting  edema of bilateral lower extremities  Gastrointestinal: non tender, no palpable masses  Musculoskeletal: ROM intact, no gross deformity, thin  Neurological: No focal deficits, normal sensation  Psychological: Appropriate mood and behavior, confused  Skin: Warm and dry, bandage over chest, no drainage     Last Recorded Vitals  /55   Pulse 105   Temp 36.4 °C (97.5 °F) (Temporal)   Resp (!) 36   Wt 76.4 kg (168 lb 6.9 oz)   SpO2 93%     Relevant Results  Scheduled medications  cefepime, 2 g, intravenous, q12h  pantoprazole, 40 mg, oral, Daily before breakfast   Or  esomeprazole, 40 mg, nasoduodenal tube, Daily before breakfast   Or  pantoprazole, 40 mg, intravenous, Daily before breakfast  insulin lispro, 0-5 Units, subcutaneous, TID  metoprolol tartrate, 12.5 mg, oral, BID  predniSONE, 5 mg, oral, Daily  sodium chloride, 3 mL, nebulization, q6h LUIS        Continuous medications  dextrose 5%-0.45 % sodium chloride, 100 mL/hr, Last Rate: 100 mL/hr (06/02/24 1004)      PRN medications  PRN medications: acetaminophen, albuterol, dextrose, dextrose, glucagon, glucagon, lidocaine-diphenhydraMINE-Maalox 1:1:1, metoprolol, morphine, oxygen, oxygen  Scheduled medications  cefepime, 2 g, intravenous, q12h  pantoprazole, 40 mg, oral, Daily before breakfast   Or  esomeprazole, 40 mg, nasoduodenal tube, Daily before breakfast   Or  pantoprazole, 40 mg, intravenous,  Daily before breakfast  insulin lispro, 0-5 Units, subcutaneous, TID  metoprolol tartrate, 12.5 mg, oral, BID  predniSONE, 5 mg, oral, Daily  sodium chloride, 3 mL, nebulization, q6h LUIS      Continuous medications  dextrose 5%-0.45 % sodium chloride, 100 mL/hr, Last Rate: 100 mL/hr (06/02/24 1004)      PRN medications  PRN medications: acetaminophen, albuterol, dextrose, dextrose, glucagon, glucagon, lidocaine-diphenhydraMINE-Maalox 1:1:1, metoprolol, morphine, oxygen, oxygen   Results for orders placed or performed during the hospital encounter of 05/29/24 (from the past 24 hour(s))   POCT GLUCOSE   Result Value Ref Range    POCT Glucose 181 (H) 74 - 99 mg/dL   CBC   Result Value Ref Range    WBC 12.9 (H) 4.4 - 11.3 x10*3/uL    nRBC 12.3 (H) 0.0 - 0.0 /100 WBCs    RBC 2.93 (L) 4.50 - 5.90 x10*6/uL    Hemoglobin 9.3 (L) 13.5 - 17.5 g/dL    Hematocrit 29.3 (L) 41.0 - 52.0 %     80 - 100 fL    MCH 31.7 26.0 - 34.0 pg    MCHC 31.7 (L) 32.0 - 36.0 g/dL    RDW 15.3 (H) 11.5 - 14.5 %    Platelets 103 (L) 150 - 450 x10*3/uL   Basic Metabolic Panel   Result Value Ref Range    Glucose 197 (H) 74 - 99 mg/dL    Sodium 149 (H) 136 - 145 mmol/L    Potassium 5.3 3.5 - 5.3 mmol/L    Chloride 114 (H) 98 - 107 mmol/L    Bicarbonate 23 21 - 32 mmol/L    Anion Gap 17 10 - 20 mmol/L    Urea Nitrogen 81 (H) 6 - 23 mg/dL    Creatinine 1.43 (H) 0.50 - 1.30 mg/dL    eGFR 47 (L) >60 mL/min/1.73m*2    Calcium 10.0 8.6 - 10.3 mg/dL   POCT GLUCOSE   Result Value Ref Range    POCT Glucose 177 (H) 74 - 99 mg/dL   POCT GLUCOSE   Result Value Ref Range    POCT Glucose 209 (H) 74 - 99 mg/dL     XR chest 1 view    Result Date: 6/1/2024  Interpreted By:  Mariel Park, STUDY: XR CHEST 1 VIEW;  6/1/2024 1:38 pm   INDICATION: Signs/Symptoms:Follow up Effusion.   COMPARISON: 05/31/2024   ACCESSION NUMBER(S): [id number]   ORDERING CLINICIAN: ELISA IVEY   FINDINGS: The patient is markedly. The heart may be normal in size.   There is left-sided  pleural and parenchymal disease. There is a left-sided chest tube. There is parenchymal infiltrate at the right base.   There are atherosclerotic changes of the aorta. The bones are not well seen.   COMPARISON OF FINDING: The chest is similar.       Bilateral parenchymal infiltration. Left pleural effusion.   MACRO: none   Signed by: Mariel Park 6/1/2024 2:20 PM Dictation workstation:   [id number]    XR chest 1 view    Result Date: 6/1/2024  Interpreted By:  Chanelle Garcia, STUDY: XR CHEST 1 VIEW;  5/31/2024 1:51 pm   INDICATION: Signs/Symptoms:monitor chest tube.   COMPARISON: 05/29/2024   ACCESSION NUMBER(S): [id number]   ORDERING CLINICIAN: JOSIAH DUNNE   TECHNIQUE: Portable AP upright   FINDINGS: Pigtail catheter is present lower left hemithorax and appears grossly unchanged in position. Allowing for slightly different positioning, the left pleural fluid collection appears grossly unchanged. There is atelectasis in the left lung which appears slightly worse than the prior exam. There is no pneumothorax identified. Right lung shows no interval infiltrate. No interval changes noted in the size of the heart. The aorta is atherosclerotic. Osseous structures appear unchanged.       Left pleural fluid collection and left pleural catheter appear unchanged but atelectasis in the left lung appears increased   MACRO: None.   Signed by: Chanelle Garcia 6/1/2024 9:49 AM Dictation workstation:   [id number]    XR chest 1 view    Result Date: 5/30/2024  STUDY: Chest Radiograph, One View; 05/29/2024 11:16 PM INDICATION: Evaluate chest tube. COMPARISON: XR chest 05/29/2024 at 18:30, 05/06/2024.  (Images only). ACCESSION NUMBER(S): [id number] ORDERING CLINICIAN:  JOSIAH DUNNE TECHNIQUE:  Frontal chest was obtained at 23:10 hours. FINDINGS: CARDIOMEDIASTINAL SILHOUETTE: Cardiomediastinal silhouette is normal in size and configuration. Calcified plaque is seen in the aorta.  LUNGS: Left suprahilar fibrotic changes  noted.  Pleural drain is seen at the left lung base with a small moderate left pleural effusion and airspace disease at the left lung base.  ABDOMEN: No remarkable upper abdominal findings.  BONES: No acute osseous changes.  Generalized osseous demineralization is noted.    Left basilar airspace disease with a small left effusion and pleural drain in place, likely atelectasis although left lower lobe pneumonia is a possibility in the appropriate clinical setting. Stable chronic fibrotic changes in the left suprahilar region, superimposed pneumonia is also a possibility. Signed by Hossein Cervantes    XR chest 1 view    Result Date: 5/29/2024  Interpreted By:  Milton Brown, STUDY: XR CHEST 1 VIEW;  5/29/2024 6:30 pm   INDICATION: Signs/Symptoms:sob.   COMPARISON: 05/29/2024   ACCESSION NUMBER(S): [id number]   ORDERING CLINICIAN: JOSIAH DUNNE   FINDINGS: Interval placement of pigtail pleural drainage catheter on the left with a reduction sizeable pleural effusion. There remains dense opacity at the left lung base likely representing mixed atelectasis and pleural effusion. The right lung is relatively clear. No pneumothorax.   The cardiomediastinal silhouette and pulmonary vasculature are within normal limits.       Decreased size of left pleural effusion.   MACRO: None.   Signed by: Milton Brown 5/29/2024 7:15 PM Dictation workstation:   [id number]    ECG 12 lead    Result Date: 5/29/2024  Atrial fibrillation ST depression, probably rate related Borderline prolonged QT interval    CT angio chest for pulmonary embolism    Result Date: 5/29/2024  Interpreted By:  Milton Ohara, STUDY: CT ANGIO CHEST FOR PULMONARY EMBOLISM;  5/29/2024 9:55 am   INDICATION: 89 y/o   M with  Signs/Symptoms:Shortness of breath tachycardia auscultation clear concern for pulmonary embolism.   LIMITATIONS: Multiple images are degraded by patient motion artifact..   ACCESSION NUMBER(S): [id number]   ORDERING CLINICIAN: JAX RICE    TECHNIQUE: After the administration of intravenous nonionic contrast, thin-section arterial phase images were obtained  from the thoracic inlet down through the iliac crest. Sagittal and coronal reconstruction images were generated. Axial and coronal MIP vascular reconstruction images were also generated.   Mediastinal, lung, bone, and liver windows were reviewed. 75 ML Omnipaque 350     COMPARISON: CT scan of the chest, abdomen, and pelvis from 05/09/2024.   FINDINGS: CHEST WALL/BASE OF THE NECK: The chest wall was grossly intact. No thyromegaly or thyroid mass.   MEDIASTINUM/VALE: No gross hilar adenopathy in this exam. No axillary adenopathy. There are multiple small central mediastinal lymph nodes measuring 12 mm AP or less, compared to 16 or less previously, mildly improved. No cardiomegaly. No pericardial effusion. Moderate to advanced mural calcifications throughout the thoracic aorta. No thoracic aortic aneurysm or dissection. There is no central pulmonary artery filling defect out through each hilum. Distal to each hilum, the vessels are suboptimal due to patient respiratory motion.   LUNGS/ PLEURA/ AND TRACHEA: Previous right pleural effusion has resolved. There is now a large left pleural effusion, significantly progressed from the prior. There is prominent atelectasis throughout the left lower lobe with some involvement of the left upper lobe and left lingula. There is underlying emphysema. Previous right upper lobe infiltrative densities have nearly completely resolved. Previous anterior left upper lobe infiltrative densities show mild improvement. There is mild infiltrative density in the mid posterolateral right lower lobe, where previously there had been some atelectasis. No masslike density in either lung. No    pneumothorax. The trachea was grossly intact.   BONES: There is slight accentuation of dorsal kyphosis and slight midthoracic spine dextroscoliosis. Moderate multilevel mid and distal  thoracic spine disc space narrowing with endplate osteophytosis. Calcification of the anterior longitudinal ligament at multiple levels. No thoracic spine compression fracture. There is an old healed fracture deformity of the inferior aspect of the right scapula. Cannot accurately assess for acute rib fractures due to the patient respiratory motion with artifact. There are however several old healed bilateral rib fracture deformities. There is a destructive expansile lesion involving the posterior aspect of the left 11th rib, with surrounding soft tissue consistent with tumor. Soft tissue component measures approximately 54 x 30 mm on axial image 255, compared to 51 by 27 mm previously. There is no other such lytic lesion evident in this exam.  . Cannot accurately assess the sternum due to patient motion.   UPPER ABDOMEN: The imaged upper abdomen was grossly intact.       Mildly limited but grossly negative exam for pulmonary embolism. Please see above.   Mild nonspecific mediastinal adenopathy, mildly improved. Perhaps infectious/inflammatory.   Resolution of previous right pleural effusion. Enlargement of now large left pleural effusion. Worsening of left-sided atelectasis as described.   Significant improvement in previous bilateral pneumonias. Please see above for details.   Arthritic changes in the thoracic spine as described, unchanged. No thoracic spine compression fracture. Multiple old healed bilateral rib fracture deformities. Old healed fracture deformity the inferior aspect of the right scapula.   Suspicious expansile lytic destructive bone lesion with associated soft tissue mass posteriorly in the left 11th rib as described. The soft tissue component is slightly greater today. Metastasis from an unknown primary versus plasmacytoma.   MACRO: None   Signed by: Milton Ohara 5/29/2024 10:28 AM Dictation workstation:   [id number]    XR chest 1 view    Result Date: 5/29/2024  Interpreted By:  Milton Ohara,  STUDY: XR CHEST 1 VIEW;  5/29/2024 8:57 am   INDICATION: Signs/Symptoms:Chest Pain.   COMPARISON: Chest x-rays, most recent from 05/06/2024. CT scan chest from 05/09/2024.   ACCESSION NUMBER(S): [id number]   ORDERING CLINICIAN: JAX RICE   TECHNIQUE: Single AP portable view of the chest was obtained.   FINDINGS: MEDIASTINUM/ LUNGS/ VALE: Stable cardiomegaly. Right pleural effusion has apparently cleared. There is mild haziness in the perihilar inferior medial right lung. Previous infiltrate in the right upper lobe has resolved. There is worsening of infiltrative opacity now present throughout most of the left lung, along with worsening of left pleural effusion. Pulmonary vasculature is mildly prominent and indistinct. Stable calcification in the aorta. No pneumothorax. No tracheal deviation. No abnormal hilar fullness or gross mass on either side.   BONES: No lytic or blastic destructive bone lesion.   UPPER ABDOMEN: Grossly intact.       Cardiomegaly with mild central vascular congestion.   Progression of left pleural effusion.   Progression of pneumonia versus asymmetric edema throughout much of the left lung.   Previous right upper lobe infiltrate has resolved. Right effusion has resolved. There is mild atelectasis or infiltrate/edema at the infrahilar medial right lung base.   MACRO: None   Signed by: Milton Ohara 5/29/2024 9:26 AM Dictation workstation:   [id number]    XR skeletal survey complete    Result Date: 5/11/2024  Interpreted By:  Bri Kim, STUDY: XR SKELETAL SURVEY COMPLETE 5/10/2024 7:09 pm   INDICATION: Signs/Symptoms:Lytic lesion involving left 11th ribs   COMPARISON: None available.   ACCESSION NUMBER(S): [id number]   ORDERING CLINICIAN: ISAK KAMARA   TECHNIQUE: Skeletal survey is performed.   FINDINGS: Osteolytic bony destruction of the left 11th rib posteriorly is seen. No additional osteolytic or osteoblastic bony lesions are identified. There is no endosteal scalloping seen within  the long bones. No pathologic compression fractures of the spine are identified.   There is postoperative change from bipolar right hip arthroplasty.   Bilateral perihilar infiltrates are noted.       Solitary osteolytic lesion destroying posterior left 11th rib. 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 The possibility of a solitary osteolytic bony metastasis is not excluded however.   Signed by: Bri Kim 5/11/2024 2:01 PM Dictation workstation:   [id number]    CT chest abdomen pelvis w IV contrast    Result Date: 5/10/2024  Interpreted By:  Robert Alarcon, STUDY: CT CHEST ABDOMEN PELVIS W IV CONTRAST;  5/9/2024 6:23 pm   INDICATION: Signs/Symptoms:pain   COMPARISON: None.   ACCESSION NUMBER(S): [id number]   ORDERING CLINICIAN: ISAK KAMARA   TECHNIQUE: CT of the chest, abdomen, and pelvis was performed. Sequential transaxial images were obtained with orthogonal reconstructions. 75mL of  Omnipaque 350 was injected intravenously.   FINDINGS: CHEST:   LUNG/PLEURA/LARGE AIRWAYS: There are small to moderate bilateral pleural effusions with mild posterior basilar compressive atelectasis. Additionally there are ground-glass and partially consolidating infiltrates at the upper lobes with mild bronchiectasis, with additional small area of ground-glass attenuation at the superior segment of the left lower lobe, most likely from multifocal pneumonia.     VESSELS: The thoracic aorta is of normal course and caliber , but with mild atherosclerotic calcification .   Main pulmonary artery and its branches are normal in caliber.   There is moderate coronary artery atherosclerotic calcification primarily involving the LAD.   HEART: The heart is normal in size. There is no pericardial effusion.   MEDIASTINUM AND VALE: There is a borderline AP window node measuring 1 cm in short axis, probably reactive. However, early malignant node is a consideration given the left 11th rib lesion described below.   The thyroid gland as  visualized appears unremarkable.   CHEST WALL AND LOWER NECK: There is an expansile and lytic lesion of the left 11th rib posteriorly, measuring 4.7 x 3 cm. This would be most consistent with malignancy, probably metastatic but primary lesion such as plasmacytoma etc. Is possible. There is hypodensity measuring approximately 8 mm at the T7 and T8 endplates, probably benign notochordal remnants. However, an early lytic lesion given the left rib is also a consideration.   There is mild subcutaneous edema of the lower lateral chest walls, greater on the right.   ABDOMEN:   LIVER: The hepatic parenchyma is homogeneous without evidence of focal liver lesions.The hepatic size is normal.   SPLEEN: The spleen is normal in size and homogeneous.   ADRENAL GLANDS: Bilateral adrenal glands appear normal.   KIDNEYS AND URETERS: The renal cortices are unremarkable and the renal sizes within normal limits , with cortical cyst(s).   The distal right ureter at the pelvis is obscured due to beam hardening artifact from a right hip replacement. Otherwise no identified urinary tract dilatation or calculi.   PANCREAS: The pancreas appears unremarkable, there is no ductal dilatation or masses.   GALLBLADDER: No radiodense calculi, wall thickening or pericholecystic fluid.   BILE DUCTS: There is no biliary dilatation or filling defects.     VESSELS: There is moderate to extensive atherosclerotic calcification of the aorta and iliac vessels, and proximal 1 cm segment of the right renal artery. There is mild atherosclerotic calcification of the proximal left renal artery. There is also moderate atherosclerotic plaque of the superior mesenteric artery proximally.   PERITONEUM AND RETROPERITONEUM: No ascites considering obscured right lower pelvis due to the right hip arthroplasty. No free intraperitoneal air.   The retroperitoneum appears unremarkable, and without significant adenopathy.   No enlarged mesenteric lymph nodes.   BOWEL:  Moderate diverticulosis greatest at the distal descending and proximal sigmoid colon. Bowel at the lower pelvis is partially obscured again due to beam hardening artifact from a right hip arthroplasty. There is mild gastric distention with intraluminal content probably from recent ingestion. The bowel including the appendix otherwise is unremarkable without significant dilatation or mural thickening.   PELVIS:   BLADDER: Partially obscured, otherwise grossly unremarkable.   REPRODUCTIVE ORGANS: The pelvis, particularly right laterally is obscured by beam hardening, otherwise grossly unremarkable.     BONE, ABDOMINAL WALL AND OTHER FINDINGS: Relative sclerosis measuring 1.4 cm at the right sacral ala may be due to an enostosis, but blastic metastasis is possible.   The abdominal wall soft tissues appear normal.       CHEST 1.  Small to moderate bilateral pleural effusions with compressive posterior basilar atelectasis. There are additional infiltrates greater in the upper lobes probably from multifocal pneumonia. 2. Expansile lytic lesion the left 11th rib, probably metastasis although primary bone malignancy is possible. 3. Borderline AP window node.   ABDOMEN - PELVIS 1.  1.5 cm sclerosis at the right sacral ala, possibly blastic metastasis. If further evaluation is needed a bone scan would be recommended for skeletal survey. 2. Moderate diverticulosis.   MACRO: 1.   Signed by: Robert Alarcon 5/10/2024 9:19 AM Dictation workstation:   [id number]    CT thoracic spine wo IV contrast    Result Date: 5/9/2024  Interpreted By:  Jc Tran, STUDY: CT THORACIC SPINE WO IV CONTRAST;  5/8/2024 3:55 pm   INDICATION: Signs/Symptoms:pain.   COMPARISON: None.   ACCESSION NUMBER(S): [id number]   ORDERING CLINICIAN: ISAK KAMARA   TECHNIQUE: Thin cut axial CT images through the thoracic spine were obtained and reconstructed in the coronal and sagittal planes.   FINDINGS: Counting from below, the caudal most ribs are noted  to be small and for the sake of numbering purposes will be labeled as the 12th ribs.   There is a lytic expansile osseous lesion involving the  posterior left 11th rib with surrounding intermediate attenuation soft tissue fullness most concerning for underlying neoplasm/metastatic disease.   There are small vague lucencies within the inferior T7 and superior T8 vertebrae as seen on sagittal reconstructed slice 54 of 101 which could be benign or malignant in etiology.   There is exaggeration of the kyphotic curvature of the thoracic spine. There is 2 mm of anterolisthesis of T1 on T2.   No acute fracture is noted.   There is bony ankylosis of mid and lower thoracic vertebrae.   There is spondylosis noted throughout the thoracic as well as the partially imaged upper lumbar and lower cervical regions.   There are bilateral pleural effusions. There are scattered somewhat ill-defined areas of opacity/airspace disease within the lungs bilaterally. Correlation with a dedicated CT of the chest would be recommended.         Counting from below, the caudal most ribs are noted to be small and for the sake of numbering purposes will be labeled as the 12th ribs.   There is a lytic expansile osseous lesion involving the  posterior left 11th rib with surrounding intermediate attenuation soft tissue fullness most concerning for underlying neoplasm/metastatic disease.   There are small vague lucencies within the inferior T7 and superior T8 vertebrae as seen on sagittal reconstructed slice 54 of 101 which could be benign or malignant in etiology.   There is exaggeration of the kyphotic curvature of the thoracic spine. There is 2 mm of anterolisthesis of T1 on T2.   No acute fracture is noted.   There is bony ankylosis of mid and lower thoracic vertebrae.   There is spondylosis noted throughout the thoracic as well as the partially imaged upper lumbar and lower cervical regions.   There are bilateral pleural effusions. There are  scattered somewhat ill-defined areas of opacity/airspace disease within the lungs bilaterally. Correlation with a dedicated CT of the chest would be recommended.   MACRO: None   Signed by: Jc Tran 5/9/2024 5:53 AM Dictation workstation:   [id number]    XR hip right with pelvis when performed 2 or 3 views    Result Date: 5/6/2024  Interpreted By:  Finkelstein, Evan, STUDY: XR HIP RIGHT WITH PELVIS WHEN PERFORMED 2 OR 3 VIEWS;  5/6/2024 4:51 am three views right hip. AP view of the pelvis   INDICATION: Signs/Symptoms:fall, right lateral hip pain.   COMPARISON: Pelvic radiograph 04/30/2017   ACCESSION NUMBER(S): [id number]   ORDERING CLINICIAN: ARNAV BURCIAGA   FINDINGS: Right hip arthroplasty hardware is present and appears intact. No surrounding lucency or periprosthetic fracture. Bones are demineralized. There are vascular calcifications. No acute displaced fracture or dislocation of the pelvis. No widening of the pubic symphysis. Degenerative changes in the visualized lower lumbosacral spine.       Right hip arthroplasty hardware present without surrounding lucency or periprosthetic fracture. No acute displaced fracture or dislocation of the pelvis.     MACRO: None.   Signed by: Evan Finkelstein 5/6/2024 6:00 AM Dictation workstation:   [id number]    XR chest 1 view    Result Date: 5/6/2024  Interpreted By:  Finkelstein, Evan, STUDY: XR CHEST 1 VIEW;  5/6/2024 4:31 am   INDICATION: Signs/Symptoms:Trauma.   COMPARISON: Chest radiograph 04/30/2017   ACCESSION NUMBER(S): [id number]   ORDERING CLINICIAN: ARNAV BURCIAGA   FINDINGS:     CARDIOMEDIASTINAL SILHOUETTE: Enlarged cardiac silhouette, similar compared to prior imaging. Calcifications overlie the aortic arch.   LUNGS: Patchy airspace opacities overlying the upper aspect of the lungs bilaterally. No pneumothorax   ABDOMEN: No remarkable upper abdominal findings.   BONES: There are irregularities along the lateral aspect of multiple left-sided ribs, which  appear similar compared to prior imaging.       Patchy airspace opacities overlying the upper aspect of the lungs bilaterally. Findings may represent pulmonary contusions in the setting of trauma. Recommend follow-up to resolution. Irregularities along the lateral aspect of multiple left-sided ribs, which overall appears similar compared to prior imaging.   MACRO: None.   Signed by: Evan Finkelstein 5/6/2024 5:58 AM Dictation workstation:   [id number]    CT head W O contrast trauma protocol    Result Date: 5/6/2024  Interpreted By:  Finkelstein, Evan, STUDY: CT HEAD W/O CONTRAST TRAUMA PROTOCOL; CT CERVICAL SPINE WO IV CONTRAST;  5/6/2024 4:22 am   INDICATION: Signs/Symptoms:fall on thinners.   COMPARISON: CT brain 04/30/2017   ACCESSION NUMBER(S): [id number]; [id number]   ORDERING CLINICIAN: ARNAV BURCIAGA   TECHNIQUE: Noncontrast CT images of the head with coronal and sagittal reconstructions. Axial noncontrast CT images of the cervical spine with coronal and sagittal reconstructed images.   FINDINGS: EXTRACRANIAL SOFT TISSUES: Unremarkable.   CALVARIUM: No depressed skull fracture. No destructive osseous lesion.   PARANASAL SINUSES/MASTOIDS: The visualized paranasal sinuses and mastoid air cells are aerated.   HEMORRHAGE: No acute intracranial hemorrhage.   BRAIN PARENCHYMA: Gray-white matter interfaces are preserved. No mass effect or midline shift. There are nonspecific scattered white matter hypodensities.   VENTRICLES and EXTRA-AXIAL SPACES: Parenchymal atrophy with prominence of the ventricles and cortical sulci.   OTHER FINDINGS: There are calcifications within the cavernous carotids   CERVICAL SPINE:   ALIGNMENT: No traumatic malalignment VERTEBRAE: No acute loss of vertebral body height. Bones are demineralized DISC SPACE: Bony fusion across the C5/C6 disc space. SPINAL CANAL: Multilevel facet and uncovertebral arthropathy with varying degrees of neural foraminal stenosis. No severe central narrowing.  PREVERTEBRAL SOFT TISSUES: No prevertebral soft tissue swelling. LUNG APICES: Imaged portion of the lung apices are within normal limits.   OTHER FINDINGS: None.         No acute intracranial hemorrhage, mass effect or midline shift.   Nonspecific scattered white matter hypodensities favored to represent sequela of small vessel ischemia. Cervical spondylosis without acute loss of vertebral body height or traumatic malalignment.     MACRO: None.   Signed by: Evan Finkelstein 5/6/2024 4:43 AM Dictation workstation:   [id number]    CT cervical spine wo IV contrast    Result Date: 5/6/2024  Interpreted By:  Finkelstein, Evan, STUDY: CT HEAD W/O CONTRAST TRAUMA PROTOCOL; CT CERVICAL SPINE WO IV CONTRAST;  5/6/2024 4:22 am   INDICATION: Signs/Symptoms:fall on thinners.   COMPARISON: CT brain 04/30/2017   ACCESSION NUMBER(S): [id number]; [id number]   ORDERING CLINICIAN: ARNAV BURCIAGA   TECHNIQUE: Noncontrast CT images of the head with coronal and sagittal reconstructions. Axial noncontrast CT images of the cervical spine with coronal and sagittal reconstructed images.   FINDINGS: EXTRACRANIAL SOFT TISSUES: Unremarkable.   CALVARIUM: No depressed skull fracture. No destructive osseous lesion.   PARANASAL SINUSES/MASTOIDS: The visualized paranasal sinuses and mastoid air cells are aerated.   HEMORRHAGE: No acute intracranial hemorrhage.   BRAIN PARENCHYMA: Gray-white matter interfaces are preserved. No mass effect or midline shift. There are nonspecific scattered white matter hypodensities.   VENTRICLES and EXTRA-AXIAL SPACES: Parenchymal atrophy with prominence of the ventricles and cortical sulci.   OTHER FINDINGS: There are calcifications within the cavernous carotids   CERVICAL SPINE:   ALIGNMENT: No traumatic malalignment VERTEBRAE: No acute loss of vertebral body height. Bones are demineralized DISC SPACE: Bony fusion across the C5/C6 disc space. SPINAL CANAL: Multilevel facet and uncovertebral arthropathy with  varying degrees of neural foraminal stenosis. No severe central narrowing. PREVERTEBRAL SOFT TISSUES: No prevertebral soft tissue swelling. LUNG APICES: Imaged portion of the lung apices are within normal limits.   OTHER FINDINGS: None.         No acute intracranial hemorrhage, mass effect or midline shift.   Nonspecific scattered white matter hypodensities favored to represent sequela of small vessel ischemia. Cervical spondylosis without acute loss of vertebral body height or traumatic malalignment.     MACRO: None.   Signed by: Evan Finkelstein 5/6/2024 4:43 AM Dictation workstation:   [id number]         Assessment & Plan:  Rodo Fam is a 89 y.o. male with past medical history of hypertension, A-fib Xarelto, moderate AV stenosis, MGUS, prostate cancer, malignant melanoma, basal cell carcinoma, SIADH who presented to the emergency department, admitted to ICU, treated for pulm effusion with CT with was removed and pt was transferred to general medicine.     Acute hypoxic respiratory failure, improving  Hospital-acquired pneumonia   Large left-sided pleural effusion  Atelectasis  Acute metabolic encephalopathy, waxing and waning  Acute on chronic anemia secondary to hemothorax, improving  - On CPAP and nasal cannula.    -Pneumonia workup, negative strep and Legionella and MRSA.  Vancomycin discontinued Mycoplasma pending.  Continuing cefepime and azithromycin day 4 of antibiotic treatment.    -Chest tube removed, appreciate CT surgery recommendations regarding re initiation of anticoagulation and bleeding risk.  Chest x-rays as needed.  Hoarseness 2/2 pharyngitis, suspect viral  -severe hoarseness and difficult to understand speech.   Dysphagia  -SLP reevaluated patient, recommends holding diet until mentation improves.  HERNÁN, stable  Lactic acidosis, improving  - Creatinine fluctuating between 1.65 and 1.2 to, overall downtrend since admission.  Baseline seems close to 0 .7, 0 .8  -Trend lactate  level.  Chronic A-fib, on anticoagulation  Moderate aortic valve stenosis  Elevated troponin, Type 2 MI, demand ischemia, from above  -Holding home Xarelto  -Telemetry monitoring  Lytic lesion of rib  Hx of MGUS  -will need OP follow up with Hem onc +/- bone marrow biopsy to evaluate for progression of MGUS to MM.    Diet: Varies pending mentation between NPO and Puréed diet with moderately thick liquids.  Meds crushed in purée.  Spoon feeds only.  Feeding one-to-one.  DVT ppx: SCDs  IVF: As needed  Consults: CT surgery  Dispo: General medicine, telemetry.    Plans to eventually return to skilled nursing facility with palliative care     I have reviewed and interpreted all lab test imaging studies and documentations from other healthcare providers. Case to be discussed with attending, A&P above reflect tentative plan. Please await for final signature from attending physician on service.     Scarlet Rodríguez MD PGY-2  Please message me if you have any questions

## 2024-06-02 NOTE — CARE PLAN
The patient's goals for the shift include    Problem: Pain - Adult  Goal: Verbalizes/displays adequate comfort level or baseline comfort level  Outcome: Progressing  Flowsheets (Taken 6/2/2024 0950)  Verbalizes/displays adequate comfort level or baseline comfort level: Encourage patient to monitor pain and request assistance     Problem: Safety - Adult  Goal: Free from fall injury  Outcome: Progressing  Note: Hourly rounding, nonskid footwear, bed alarm, call light within reach     Problem: Discharge Planning  Goal: Discharge to home or other facility with appropriate resources  Outcome: Progressing  Flowsheets (Taken 6/2/2024 0950)  Discharge to home or other facility with appropriate resources: Identify barriers to discharge with patient and caregiver     Problem: Chronic Conditions and Co-morbidities  Goal: Patient's chronic conditions and co-morbidity symptoms are monitored and maintained or improved  Outcome: Progressing  Flowsheets (Taken 6/2/2024 0950)  Care Plan - Patient's Chronic Conditions and Co-Morbidity Symptoms are Monitored and Maintained or Improved: Monitor and assess patient's chronic conditions and comorbid symptoms for stability, deterioration, or improvement     Problem: Skin  Goal: Decreased wound size/increased tissue granulation at next dressing change  Outcome: Progressing  Note: Q2 hr turns/repositioning  Goal: Participates in plan/prevention/treatment measures  Outcome: Progressing  Goal: Prevent/manage excess moisture  Outcome: Progressing  Goal: Prevent/minimize sheer/friction injuries  Outcome: Progressing  Goal: Promote/optimize nutrition  Outcome: Progressing  Goal: Promote skin healing  Outcome: Progressing     Problem: Fall/Injury  Goal: Not fall by end of shift  Outcome: Progressing  Note: Hourly rounding, nonskid footwear, bed alarm, call light within reach  Goal: Be free from injury by end of the shift  Outcome: Progressing  Note: Hourly rounding, nonskid footwear, bed alarm,  call light within reach  Goal: Verbalize understanding of personal risk factors for fall in the hospital  Outcome: Progressing  Goal: Verbalize understanding of risk factor reduction measures to prevent injury from fall in the home  Outcome: Progressing  Goal: Use assistive devices by end of the shift  Outcome: Progressing  Goal: Pace activities to prevent fatigue by end of the shift  Outcome: Progressing     Problem: Respiratory  Goal: Clear secretions with interventions this shift  Outcome: Progressing  Goal: Minimize anxiety/maximize coping throughout shift  Outcome: Progressing  Goal: Minimal/no exertional discomfort or dyspnea this shift  Outcome: Progressing  Goal: No signs of respiratory distress (eg. Use of accessory muscles. Peds grunting)  Outcome: Progressing  Goal: Patent airway maintained this shift  Outcome: Progressing  Goal: Tolerate mechanical ventilation evidenced by VS/agitation level this shift  Outcome: Progressing  Goal: Tolerate pulmonary toileting this shift  Outcome: Progressing  Goal: Verbalize decreased shortness of breath this shift  Outcome: Progressing  Goal: Wean oxygen to maintain O2 saturation per order/standard this shift  Outcome: Progressing  Goal: Increase self care and/or family involvement in next 24 hours  Outcome: Progressing     Problem: Pain  Goal: Takes deep breaths with improved pain control throughout the shift  Outcome: Progressing  Goal: Turns in bed with improved pain control throughout the shift  Outcome: Progressing  Goal: Walks with improved pain control throughout the shift  Outcome: Progressing  Goal: Performs ADL's with improved pain control throughout shift  Outcome: Progressing  Goal: Participates in PT with improved pain control throughout the shift  Outcome: Progressing  Goal: Free from opioid side effects throughout the shift  Outcome: Progressing  Goal: Free from acute confusion related to pain meds throughout the shift  Outcome: Progressing     Problem:  Safety - Medical Restraint  Goal: Remains free of injury from restraints (Restraint for Interference with Medical Device)  Outcome: Progressing  Goal: Free from restraint(s) (Restraint for Interference with Medical Device)  Outcome: Progressing       The clinical goals for the shift include pt will remain HDS

## 2024-06-02 NOTE — CARE PLAN
The patient's goals for the shift include  resting    The clinical goals for the shift include pt. will remain hemodynamically stable    Over the shift, the patient did not make progress toward the following goals. Barriers to progression include  being unable to sleep . Recommendations to address these barriers include reduced lighting.

## 2024-06-02 NOTE — NURSING NOTE
Patient resting, HR elevated from 100-124, but not sustaining. Patient unable to take night lopressor d/t lethargy. Informed hospitalist , will treat in HR rises.

## 2024-06-03 NOTE — CARE PLAN
The patient's goals for the shift include  to rest and have decreased respirations.    The clinical goals for the shift include pt. will remain hemodynamically stable    The patient is resting; however, his is breathing is shallow and labored. Medicated  Problem: Pain - Adult  Goal: Verbalizes/displays adequate comfort level or baseline comfort level  Outcome: Not Progressing     Problem: Safety - Adult  Goal: Free from fall injury  Outcome: Not Progressing     Problem: Discharge Planning  Goal: Discharge to home or other facility with appropriate resources  Outcome: Not Progressing     Problem: Chronic Conditions and Co-morbidities  Goal: Patient's chronic conditions and co-morbidity symptoms are monitored and maintained or improved  Outcome: Not Progressing     Problem: Skin  Goal: Decreased wound size/increased tissue granulation at next dressing change  Outcome: Not Progressing  Goal: Participates in plan/prevention/treatment measures  Outcome: Not Progressing  Goal: Prevent/manage excess moisture  Outcome: Not Progressing  Goal: Prevent/minimize sheer/friction injuries  Outcome: Not Progressing  Goal: Promote/optimize nutrition  Outcome: Not Progressing  Goal: Promote skin healing  Outcome: Not Progressing     Problem: Fall/Injury  Goal: Not fall by end of shift  Outcome: Not Progressing  Goal: Be free from injury by end of the shift  Outcome: Not Progressing  Goal: Verbalize understanding of personal risk factors for fall in the hospital  Outcome: Not Progressing  Goal: Verbalize understanding of risk factor reduction measures to prevent injury from fall in the home  Outcome: Not Progressing  Goal: Use assistive devices by end of the shift  Outcome: Not Progressing  Goal: Pace activities to prevent fatigue by end of the shift  Outcome: Not Progressing     Problem: Respiratory  Goal: Clear secretions with interventions this shift  Outcome: Not Progressing  Goal: Minimize anxiety/maximize coping throughout  shift  Outcome: Not Progressing  Goal: Minimal/no exertional discomfort or dyspnea this shift  Outcome: Not Progressing  Goal: No signs of respiratory distress (eg. Use of accessory muscles. Peds grunting)  Outcome: Not Progressing  Goal: Patent airway maintained this shift  Outcome: Not Progressing  Goal: Tolerate mechanical ventilation evidenced by VS/agitation level this shift  Outcome: Not Progressing  Goal: Tolerate pulmonary toileting this shift  Outcome: Not Progressing  Goal: Verbalize decreased shortness of breath this shift  Outcome: Not Progressing  Goal: Wean oxygen to maintain O2 saturation per order/standard this shift  Outcome: Not Progressing  Goal: Increase self care and/or family involvement in next 24 hours  Outcome: Not Progressing     Problem: Pain  Goal: Takes deep breaths with improved pain control throughout the shift  Outcome: Not Progressing  Goal: Turns in bed with improved pain control throughout the shift  Outcome: Not Progressing  Goal: Walks with improved pain control throughout the shift  Outcome: Not Progressing  Goal: Performs ADL's with improved pain control throughout shift  Outcome: Not Progressing  Goal: Participates in PT with improved pain control throughout the shift  Outcome: Not Progressing  Goal: Free from opioid side effects throughout the shift  Outcome: Not Progressing  Goal: Free from acute confusion related to pain meds throughout the shift  Outcome: Not Progressing     Problem: Safety - Medical Restraint  Goal: Remains free of injury from restraints (Restraint for Interference with Medical Device)  Outcome: Not Progressing  Goal: Free from restraint(s) (Restraint for Interference with Medical Device)  Outcome: Not Progressing    pain per Mar. Will CTM  Problem: Pain - Adult  Goal: Verbalizes/displays adequate comfort level or baseline comfort level  Outcome: Not Progressing     Problem: Safety - Adult  Goal: Free from fall injury  Outcome: Not Progressing      Problem: Discharge Planning  Goal: Discharge to home or other facility with appropriate resources  Outcome: Not Progressing     Problem: Chronic Conditions and Co-morbidities  Goal: Patient's chronic conditions and co-morbidity symptoms are monitored and maintained or improved  Outcome: Not Progressing     Problem: Skin  Goal: Decreased wound size/increased tissue granulation at next dressing change  Outcome: Not Progressing  Goal: Participates in plan/prevention/treatment measures  Outcome: Not Progressing  Goal: Prevent/manage excess moisture  Outcome: Not Progressing  Goal: Prevent/minimize sheer/friction injuries  Outcome: Not Progressing  Goal: Promote/optimize nutrition  Outcome: Not Progressing  Goal: Promote skin healing  Outcome: Not Progressing     Problem: Fall/Injury  Goal: Not fall by end of shift  Outcome: Not Progressing  Goal: Be free from injury by end of the shift  Outcome: Not Progressing  Goal: Verbalize understanding of personal risk factors for fall in the hospital  Outcome: Not Progressing  Goal: Verbalize understanding of risk factor reduction measures to prevent injury from fall in the home  Outcome: Not Progressing  Goal: Use assistive devices by end of the shift  Outcome: Not Progressing  Goal: Pace activities to prevent fatigue by end of the shift  Outcome: Not Progressing     Problem: Respiratory  Goal: Clear secretions with interventions this shift  Outcome: Not Progressing  Goal: Minimize anxiety/maximize coping throughout shift  Outcome: Not Progressing  Goal: Minimal/no exertional discomfort or dyspnea this shift  Outcome: Not Progressing  Goal: No signs of respiratory distress (eg. Use of accessory muscles. Peds grunting)  Outcome: Not Progressing  Goal: Patent airway maintained this shift  Outcome: Not Progressing  Goal: Tolerate mechanical ventilation evidenced by VS/agitation level this shift  Outcome: Not Progressing  Goal: Tolerate pulmonary toileting this shift  Outcome: Not  Progressing  Goal: Verbalize decreased shortness of breath this shift  Outcome: Not Progressing  Goal: Wean oxygen to maintain O2 saturation per order/standard this shift  Outcome: Not Progressing  Goal: Increase self care and/or family involvement in next 24 hours  Outcome: Not Progressing     Problem: Pain  Goal: Takes deep breaths with improved pain control throughout the shift  Outcome: Not Progressing  Goal: Turns in bed with improved pain control throughout the shift  Outcome: Not Progressing  Goal: Walks with improved pain control throughout the shift  Outcome: Not Progressing  Goal: Performs ADL's with improved pain control throughout shift  Outcome: Not Progressing  Goal: Participates in PT with improved pain control throughout the shift  Outcome: Not Progressing  Goal: Free from opioid side effects throughout the shift  Outcome: Not Progressing  Goal: Free from acute confusion related to pain meds throughout the shift  Outcome: Not Progressing     Problem: Safety - Medical Restraint  Goal: Remains free of injury from restraints (Restraint for Interference with Medical Device)  Outcome: Not Progressing  Goal: Free from restraint(s) (Restraint for Interference with Medical Device)  Outcome: Not Progressing

## 2024-06-03 NOTE — SIGNIFICANT EVENT
I was called to the bedside to pronounce that patient in room 714 had .  No spontaneous movements were present.  There was no response to verbal or tactile stimuli.  Pupils were dilated and fixed.  Corneal reflex absent.  No breath sounds were auscultated.  No carotid pulse was palpable.  No heart sounds were auscultated over entire precordium. Patient pronounced at 1:12pm on 6/3/24.

## 2024-06-03 NOTE — PROGRESS NOTES
Occupational Therapy                 Therapy Communication Note    Patient Name: Rodo Fam  MRN: [id number]  Today's Date: 6/3/2024     Discipline: Occupational Therapy    Missed Visit Reason: Missed Visit Reason:  (pt. transitioning to hospice, discharge ot)    Missed Time: Attempt    Comment:

## 2024-06-03 NOTE — PROGRESS NOTES
This TCC met with family outside patient's room.  Planning to transition patient to hospice GIP with [facility].  ARLETH notified of niece's arrival.  Care Transitions will continue to follow.

## 2024-06-03 NOTE — PROGRESS NOTES
ARLETH was informed this morning by palliative CNP patient will need to be switched to GIP form palliative medicine.  Patient was already on the Navigator Program with [facility] but due to patient's declining condition, hospice has been initiated.  ARLETH contacted the patient's wife Bonnie to provide an update and inform her a referral has been made.  She stated she and her niece would be in around 9am.  SW updated the doctor and palliative CNP.  [facility] was also contacted as well and it was requested staff contact the family as patient will need to switch to GIP.  All updates were placed in [facility].  Will continue to follow.    YOLANDA HESS LCSW

## 2024-06-03 NOTE — DISCHARGE SUMMARY
Discharge Diagnosis  #Acute hypoxic respiratory failure  #Hospital-acquired pneumonia  #Large left-sided pleural effusion s/p chest tube and removal  #HERNÁN  #Lactic acidosis      Test Results Pending At Discharge  Pending Labs       Order Current Status    Cytology (Non-Gynecologic) In process    Sterile Fluid Culture/Smear Preliminary result            Hospital Course   Patient is an 89-year-old male with a past medical history of hypertension, A-fib Xarelto, moderate AV stenosis, MGUS, prostate cancer, malignant melanoma, basal cell carcinoma, SIADH who presented to the emergency department, admitted to ICU, treated for pleural effusion and healthcare-acquired pneumonia.  Patient was apparently unable to contribute to history due to physical status.  Chest tube was placed  And patient was being treated with antibiotics.  Despite antibiotics, fluid resuscitation and chest tube for drainage patient continued to clinically deteriorate and became hypotensive and unresponsive overnight 6/3.  Palliative care was consulted and after discussion with patient's family patient's CODE STATUS was switched to DNR CC.  Patient was given morphine and placed on nasal cannula oxygen for comfort.  Patient passed away at 1: 12 PM on 6/3/2024.    Pertinent Physical Exam At Time of Discharge  Physical Exam  Corneal reflexes absent, no breath sounds auscultated.  No carotid pulse palpable.  No heart sounds auscultated.  No spontaneous movements, no response to verbal or tactile stimuli.  Pupils dilated and fixed.    Outpatient Follow-Up  No future appointments.      Roosevelt Acosta DO

## 2024-06-03 NOTE — CONSULTS
Inpatient consult to Palliative Care  Consult performed by: LONNIE Churchill-CNP  Consult ordered by: Fabienne Jacob MD          Reason For Consult  Reason for Consult: communication / medical decision making     History Of Present Illness  Rodo Fam is a 89 y.o. male with past medical history of  moderate AV stenosis, A-fib on Xarelto, prostate cancer, malignant melanoma, basal cell carcinoma, SIADH, and hypertension, was admitted 5/29/2024 from rehab with shortness of breath.  Rehab staff reported the patient was having progressive shortness of breath and was recently treated for right lower lobe pneumonia; upon arrival to the ED, the patient did require high flow Airvo to be placed to maintain SpO2 greater than 90%.  Other significant findings in the ED included creatinine 1.65 (baseline 0.9), lactate 11 with repeat 7.9, magnesium 3.49, WBC 14.6, hemoglobin 9.1, INR 3.2, and CT chest which did show large left effusion with atelectasis and bibasilar pneumonia, as well as a known lytic lesion on the 11th rib.  The patient has been following with oncology as an outpatient and was recommended for skeletal survey.  The patient was initiated on IV fluids and broad-spectrum IV antibiotics and then admitted under the ICU service.  CTS was consulted when the patient's left pleural effusion was found to be with a left-sided hemothorax, and a left chest tube was placed.  The patient's family did state their goal is to get the patient home and to continue to care for him and rehabilitate him in that setting.  Of note, the patient is DNR CCA DNI CODE STATUS.  The patient has had significant clinical decline over the weekend.  Palliative care was consulted to discuss goals of care and advance care planning.    Upon assessment of the patient this morning, the patient is obtunded and not responsive to verbal nor light tactile nor painful stimuli.  Nursing reported the patient has been in this condition for the last  several days, although has become more hypotensive and tachypneic this morning.    ESAS ([place] Symptom Assessment System): Unable to assess due to the patient's current condition and mentation.    PPS (Palliative Prognostic Scale): Previously 60 to 70%.  Currently, 10%.    PPI (Palliative Prognostic Index): 11.0    PMH: as above     Personal/Social History  The patient was previously in rehab but does previously live home with his wife.  Documentation supports he is a never smoker and does not currently use alcohol nor illicit drugs.     Past Medical History  He has a past medical history of Hypertension.    Surgical History  He has no past surgical history on file.     Family History  No family history on file.  Allergies  Penicillins    Review of Systems   Reason unable to perform ROS: Current condition and mentation.        Physical Exam  Constitutional:       General: He is in acute distress.      Appearance: He is normal weight. He is ill-appearing. He is not toxic-appearing or diaphoretic.      Comments: Obtunded, frail   HENT:      Head: Normocephalic and atraumatic.      Right Ear: External ear normal.      Left Ear: External ear normal.      Nose: Nose normal.      Mouth/Throat:      Mouth: Mucous membranes are dry.      Pharynx: Oropharynx is clear.   Eyes:      Comments: Sluggish bilateral pupils to light   Cardiovascular:      Rate and Rhythm: Tachycardia present. Rhythm irregular.      Heart sounds:      Gallop present.   Pulmonary:      Effort: Respiratory distress present.      Comments: Shallow tachypneic snoring respirations with minimal air movement bilaterally to auscultation  Abdominal:      General: There is no distension.      Palpations: Abdomen is soft.      Comments: No active bowel sounds noted   Genitourinary:     Comments: External urinary catheter in place  Musculoskeletal:      Cervical back: No rigidity.      Right lower leg: Edema present.      Left lower leg: Edema present.  "  Skin:     General: Skin is warm and dry.      Comments: Bilateral toes noted to be cool to touch   Neurological:      Comments: Obtunded   Psychiatric:      Comments: Without restlessness         Last Recorded Vitals  Blood pressure (!) 89/43, pulse 103, temperature 36.9 °C (98.4 °F), resp. rate (!) 47, height 1.778 m (5' 10\"), weight 76.4 kg (168 lb 6.9 oz), SpO2 93%.    Relevant Results  Scheduled medications  bisacodyl, 10 mg, rectal, Once  cefepime, 2 g, intravenous, q12h  pantoprazole, 40 mg, oral, Daily before breakfast   Or  esomeprazole, 40 mg, nasoduodenal tube, Daily before breakfast   Or  pantoprazole, 40 mg, intravenous, Daily before breakfast  insulin lispro, 0-5 Units, subcutaneous, TID  metoprolol tartrate, 12.5 mg, oral, BID  predniSONE, 5 mg, oral, Daily  sodium chloride, 3 mL, nebulization, q6h LUIS      Continuous medications  dextrose 5%-0.45 % sodium chloride, 100 mL/hr, Last Rate: 100 mL/hr (06/02/24 2050)      PRN medications  PRN medications: acetaminophen, albuterol, dextrose, dextrose, glucagon, glucagon, lidocaine-diphenhydraMINE-Maalox 1:1:1, metoprolol, morphine, oxygen, oxygen    Results for orders placed or performed during the hospital encounter of 05/29/24 (from the past 96 hour(s))   POCT GLUCOSE   Result Value Ref Range    POCT Glucose 163 (H) 74 - 99 mg/dL   Lactate   Result Value Ref Range    Lactate 3.8 (H) 0.4 - 2.0 mmol/L   Lactate   Result Value Ref Range    Lactate 4.1 (HH) 0.4 - 2.0 mmol/L   POCT GLUCOSE   Result Value Ref Range    POCT Glucose 165 (H) 74 - 99 mg/dL   Lactate   Result Value Ref Range    Lactate 3.7 (H) 0.4 - 2.0 mmol/L   POCT GLUCOSE   Result Value Ref Range    POCT Glucose 152 (H) 74 - 99 mg/dL   Lactate   Result Value Ref Range    Lactate 4.1 (HH) 0.4 - 2.0 mmol/L   POCT GLUCOSE   Result Value Ref Range    POCT Glucose 193 (H) 74 - 99 mg/dL   Lactate   Result Value Ref Range    Lactate 4.6 (HH) 0.4 - 2.0 mmol/L   Lactate   Result Value Ref Range    " Lactate 4.6 (HH) 0.4 - 2.0 mmol/L   CBC   Result Value Ref Range    WBC 10.1 4.4 - 11.3 x10*3/uL    nRBC 7.1 (H) 0.0 - 0.0 /100 WBCs    RBC 2.95 (L) 4.50 - 5.90 x10*6/uL    Hemoglobin 9.7 (L) 13.5 - 17.5 g/dL    Hematocrit 28.9 (L) 41.0 - 52.0 %    MCV 98 80 - 100 fL    MCH 32.9 26.0 - 34.0 pg    MCHC 33.6 32.0 - 36.0 g/dL    RDW 14.7 (H) 11.5 - 14.5 %    Platelets 100 (L) 150 - 450 x10*3/uL   Renal Function Panel   Result Value Ref Range    Glucose 172 (H) 74 - 99 mg/dL    Sodium 144 136 - 145 mmol/L    Potassium 4.6 3.5 - 5.3 mmol/L    Chloride 110 (H) 98 - 107 mmol/L    Bicarbonate 23 21 - 32 mmol/L    Anion Gap 16 10 - 20 mmol/L    Urea Nitrogen 81 (H) 6 - 23 mg/dL    Creatinine 1.42 (H) 0.50 - 1.30 mg/dL    eGFR 47 (L) >60 mL/min/1.73m*2    Calcium 9.2 8.6 - 10.3 mg/dL    Phosphorus 4.3 2.5 - 4.9 mg/dL    Albumin 2.7 (L) 3.4 - 5.0 g/dL   Magnesium   Result Value Ref Range    Magnesium 3.20 (H) 1.60 - 2.40 mg/dL   Lactate   Result Value Ref Range    Lactate 3.7 (H) 0.4 - 2.0 mmol/L   POCT GLUCOSE   Result Value Ref Range    POCT Glucose 155 (H) 74 - 99 mg/dL   Lactate   Result Value Ref Range    Lactate 3.6 (H) 0.4 - 2.0 mmol/L   SST TOP   Result Value Ref Range    Extra Tube Hold for add-ons.    Procalcitonin   Result Value Ref Range    Procalcitonin 1.23 (H) <=0.07 ng/mL   Lactate   Result Value Ref Range    Lactate 3.8 (H) 0.4 - 2.0 mmol/L   Protime-INR   Result Value Ref Range    Protime 15.7 (H) 9.8 - 12.8 seconds    INR 1.4 (H) 0.9 - 1.1   POCT GLUCOSE   Result Value Ref Range    POCT Glucose 160 (H) 74 - 99 mg/dL   Lactate   Result Value Ref Range    Lactate 3.4 (H) 0.4 - 2.0 mmol/L   POCT GLUCOSE   Result Value Ref Range    POCT Glucose 157 (H) 74 - 99 mg/dL   Lactate   Result Value Ref Range    Lactate 3.6 (H) 0.4 - 2.0 mmol/L   Lactate   Result Value Ref Range    Lactate 3.6 (H) 0.4 - 2.0 mmol/L   Lactate   Result Value Ref Range    Lactate 3.3 (H) 0.4 - 2.0 mmol/L   CBC   Result Value Ref Range    WBC  10.7 4.4 - 11.3 x10*3/uL    nRBC 7.4 (H) 0.0 - 0.0 /100 WBCs    RBC 2.81 (L) 4.50 - 5.90 x10*6/uL    Hemoglobin 8.9 (L) 13.5 - 17.5 g/dL    Hematocrit 28.1 (L) 41.0 - 52.0 %     80 - 100 fL    MCH 31.7 26.0 - 34.0 pg    MCHC 31.7 (L) 32.0 - 36.0 g/dL    RDW 14.7 (H) 11.5 - 14.5 %    Platelets 91 (L) 150 - 450 x10*3/uL   Renal Function Panel   Result Value Ref Range    Glucose 187 (H) 74 - 99 mg/dL    Sodium 146 (H) 136 - 145 mmol/L    Potassium 4.7 3.5 - 5.3 mmol/L    Chloride 112 (H) 98 - 107 mmol/L    Bicarbonate 23 21 - 32 mmol/L    Anion Gap 16 10 - 20 mmol/L    Urea Nitrogen 75 (H) 6 - 23 mg/dL    Creatinine 1.21 0.50 - 1.30 mg/dL    eGFR 57 (L) >60 mL/min/1.73m*2    Calcium 9.3 8.6 - 10.3 mg/dL    Phosphorus 3.9 2.5 - 4.9 mg/dL    Albumin 2.7 (L) 3.4 - 5.0 g/dL   Magnesium   Result Value Ref Range    Magnesium 3.23 (H) 1.60 - 2.40 mg/dL   POCT GLUCOSE   Result Value Ref Range    POCT Glucose 174 (H) 74 - 99 mg/dL   Lactate   Result Value Ref Range    Lactate 3.4 (H) 0.4 - 2.0 mmol/L   POCT GLUCOSE   Result Value Ref Range    POCT Glucose 181 (H) 74 - 99 mg/dL   CBC   Result Value Ref Range    WBC 12.9 (H) 4.4 - 11.3 x10*3/uL    nRBC 12.3 (H) 0.0 - 0.0 /100 WBCs    RBC 2.93 (L) 4.50 - 5.90 x10*6/uL    Hemoglobin 9.3 (L) 13.5 - 17.5 g/dL    Hematocrit 29.3 (L) 41.0 - 52.0 %     80 - 100 fL    MCH 31.7 26.0 - 34.0 pg    MCHC 31.7 (L) 32.0 - 36.0 g/dL    RDW 15.3 (H) 11.5 - 14.5 %    Platelets 103 (L) 150 - 450 x10*3/uL   Basic Metabolic Panel   Result Value Ref Range    Glucose 197 (H) 74 - 99 mg/dL    Sodium 149 (H) 136 - 145 mmol/L    Potassium 5.3 3.5 - 5.3 mmol/L    Chloride 114 (H) 98 - 107 mmol/L    Bicarbonate 23 21 - 32 mmol/L    Anion Gap 17 10 - 20 mmol/L    Urea Nitrogen 81 (H) 6 - 23 mg/dL    Creatinine 1.43 (H) 0.50 - 1.30 mg/dL    eGFR 47 (L) >60 mL/min/1.73m*2    Calcium 10.0 8.6 - 10.3 mg/dL   POCT GLUCOSE   Result Value Ref Range    POCT Glucose 177 (H) 74 - 99 mg/dL   POCT GLUCOSE    Result Value Ref Range    POCT Glucose 209 (H) 74 - 99 mg/dL   Basic Metabolic Panel   Result Value Ref Range    Glucose 240 (H) 74 - 99 mg/dL    Sodium 149 (H) 136 - 145 mmol/L    Potassium 5.3 3.5 - 5.3 mmol/L    Chloride 116 (H) 98 - 107 mmol/L    Bicarbonate 23 21 - 32 mmol/L    Anion Gap 15 10 - 20 mmol/L    Urea Nitrogen 84 (H) 6 - 23 mg/dL    Creatinine 1.59 (H) 0.50 - 1.30 mg/dL    eGFR 41 (L) >60 mL/min/1.73m*2    Calcium 9.7 8.6 - 10.3 mg/dL   Lactate   Result Value Ref Range    Lactate 3.8 (H) 0.4 - 2.0 mmol/L   SST TOP   Result Value Ref Range    Extra Tube Hold for add-ons.    Lactate   Result Value Ref Range    Lactate 3.6 (H) 0.4 - 2.0 mmol/L   POCT GLUCOSE   Result Value Ref Range    POCT Glucose 257 (H) 74 - 99 mg/dL   Lactate   Result Value Ref Range    Lactate 4.0 (HH) 0.4 - 2.0 mmol/L   CBC   Result Value Ref Range    WBC 9.9 4.4 - 11.3 x10*3/uL    nRBC 23.1 (H) 0.0 - 0.0 /100 WBCs    RBC 3.05 (L) 4.50 - 5.90 x10*6/uL    Hemoglobin 9.7 (L) 13.5 - 17.5 g/dL    Hematocrit 32.8 (L) 41.0 - 52.0 %     (H) 80 - 100 fL    MCH 31.8 26.0 - 34.0 pg    MCHC 29.6 (L) 32.0 - 36.0 g/dL    RDW 16.5 (H) 11.5 - 14.5 %    Platelets 103 (L) 150 - 450 x10*3/uL   PST Top   Result Value Ref Range    Extra Tube Hold for add-ons.      XR chest 1 view    Result Date: 6/2/2024  Interpreted By:  Arturo Landers, STUDY: XR CHEST 1 VIEW; 6/2/2024 6:15 am   INDICATION: Signs/Symptoms:Follow up L Pleural Effusion.   COMPARISON: None   ACCESSION NUMBER(S): [id number]   ORDERING CLINICIAN: ELISA IVEY   TECHNIQUE: 1 view of the chest was performed.   FINDINGS: Status post removal of the left-sided chest tube. Redemonstrated small volume left-sided pleural effusion with surrounding atelectasis/airspace opacity. Persistent bilateral interstitial prominence. The cardiomediastinal silhouette is stable. Aortic knob calcifications.       1. Status post removal of the left-sided chest tube. Persistent small volume left-sided  pleural effusion with surrounding atelectasis/airspace opacity. 2. Persistent bilateral interstitial prominence could be related to underlying edema.   Signed by: Arturo Landers 6/2/2024 10:18 AM Dictation workstation:   [id number]    XR chest 1 view    Result Date: 6/1/2024  Interpreted By:  Mariel Park, STUDY: XR CHEST 1 VIEW;  6/1/2024 1:38 pm   INDICATION: Signs/Symptoms:Follow up Effusion.   COMPARISON: 05/31/2024   ACCESSION NUMBER(S): [id number]   ORDERING CLINICIAN: ELISA IVEY   FINDINGS: The patient is markedly. The heart may be normal in size.   There is left-sided pleural and parenchymal disease. There is a left-sided chest tube. There is parenchymal infiltrate at the right base.   There are atherosclerotic changes of the aorta. The bones are not well seen.   COMPARISON OF FINDING: The chest is similar.       Bilateral parenchymal infiltration. Left pleural effusion.   MACRO: none   Signed by: Mariel Park 6/1/2024 2:20 PM Dictation workstation:   [id number]    XR chest 1 view    Result Date: 6/1/2024  Interpreted By:  Chanelle Garcia, STUDY: XR CHEST 1 VIEW;  5/31/2024 1:51 pm   INDICATION: Signs/Symptoms:monitor chest tube.   COMPARISON: 05/29/2024   ACCESSION NUMBER(S): [id number]   ORDERING CLINICIAN: JOSIAH DUNNE   TECHNIQUE: Portable AP upright   FINDINGS: Pigtail catheter is present lower left hemithorax and appears grossly unchanged in position. Allowing for slightly different positioning, the left pleural fluid collection appears grossly unchanged. There is atelectasis in the left lung which appears slightly worse than the prior exam. There is no pneumothorax identified. Right lung shows no interval infiltrate. No interval changes noted in the size of the heart. The aorta is atherosclerotic. Osseous structures appear unchanged.       Left pleural fluid collection and left pleural catheter appear unchanged but atelectasis in the left lung appears increased   MACRO: None.   Signed by:  Chanelle Pretorius 6/1/2024 9:49 AM Dictation workstation:   [id number]    XR chest 1 view    Result Date: 5/30/2024  STUDY: Chest Radiograph, One View; 05/29/2024 11:16 PM INDICATION: Evaluate chest tube. COMPARISON: XR chest 05/29/2024 at 18:30, 05/06/2024.  (Images only). ACCESSION NUMBER(S): [id number] ORDERING CLINICIAN:  JOSIAH DUNNE TECHNIQUE:  Frontal chest was obtained at 23:10 hours. FINDINGS: CARDIOMEDIASTINAL SILHOUETTE: Cardiomediastinal silhouette is normal in size and configuration. Calcified plaque is seen in the aorta.  LUNGS: Left suprahilar fibrotic changes noted.  Pleural drain is seen at the left lung base with a small moderate left pleural effusion and airspace disease at the left lung base.  ABDOMEN: No remarkable upper abdominal findings.  BONES: No acute osseous changes.  Generalized osseous demineralization is noted.    Left basilar airspace disease with a small left effusion and pleural drain in place, likely atelectasis although left lower lobe pneumonia is a possibility in the appropriate clinical setting. Stable chronic fibrotic changes in the left suprahilar region, superimposed pneumonia is also a possibility. Signed by Hossein Cervantes    XR chest 1 view    Result Date: 5/29/2024  Interpreted By:  Milton Brown, STUDY: XR CHEST 1 VIEW;  5/29/2024 6:30 pm   INDICATION: Signs/Symptoms:sob.   COMPARISON: 05/29/2024   ACCESSION NUMBER(S): [id number]   ORDERING CLINICIAN: JOSIAH DUNNE   FINDINGS: Interval placement of pigtail pleural drainage catheter on the left with a reduction sizeable pleural effusion. There remains dense opacity at the left lung base likely representing mixed atelectasis and pleural effusion. The right lung is relatively clear. No pneumothorax.   The cardiomediastinal silhouette and pulmonary vasculature are within normal limits.       Decreased size of left pleural effusion.   MACRO: None.   Signed by: Milton Brown 5/29/2024 7:15 PM Dictation workstation:    [id number]    ECG 12 lead    Result Date: 5/29/2024  Atrial fibrillation ST depression, probably rate related Borderline prolonged QT interval    CT angio chest for pulmonary embolism    Result Date: 5/29/2024  Interpreted By:  Milton Ohara, STUDY: CT ANGIO CHEST FOR PULMONARY EMBOLISM;  5/29/2024 9:55 am   INDICATION: 88 y/o   M with  Signs/Symptoms:Shortness of breath tachycardia auscultation clear concern for pulmonary embolism.   LIMITATIONS: Multiple images are degraded by patient motion artifact..   ACCESSION NUMBER(S): [id number]   ORDERING CLINICIAN: JAX RICE   TECHNIQUE: After the administration of intravenous nonionic contrast, thin-section arterial phase images were obtained  from the thoracic inlet down through the iliac crest. Sagittal and coronal reconstruction images were generated. Axial and coronal MIP vascular reconstruction images were also generated.   Mediastinal, lung, bone, and liver windows were reviewed. 75 ML Omnipaque 350     COMPARISON: CT scan of the chest, abdomen, and pelvis from 05/09/2024.   FINDINGS: CHEST WALL/BASE OF THE NECK: The chest wall was grossly intact. No thyromegaly or thyroid mass.   MEDIASTINUM/VALE: No gross hilar adenopathy in this exam. No axillary adenopathy. There are multiple small central mediastinal lymph nodes measuring 12 mm AP or less, compared to 16 or less previously, mildly improved. No cardiomegaly. No pericardial effusion. Moderate to advanced mural calcifications throughout the thoracic aorta. No thoracic aortic aneurysm or dissection. There is no central pulmonary artery filling defect out through each hilum. Distal to each hilum, the vessels are suboptimal due to patient respiratory motion.   LUNGS/ PLEURA/ AND TRACHEA: Previous right pleural effusion has resolved. There is now a large left pleural effusion, significantly progressed from the prior. There is prominent atelectasis throughout the left lower lobe with some involvement of the left  upper lobe and left lingula. There is underlying emphysema. Previous right upper lobe infiltrative densities have nearly completely resolved. Previous anterior left upper lobe infiltrative densities show mild improvement. There is mild infiltrative density in the mid posterolateral right lower lobe, where previously there had been some atelectasis. No masslike density in either lung. No    pneumothorax. The trachea was grossly intact.   BONES: There is slight accentuation of dorsal kyphosis and slight midthoracic spine dextroscoliosis. Moderate multilevel mid and distal thoracic spine disc space narrowing with endplate osteophytosis. Calcification of the anterior longitudinal ligament at multiple levels. No thoracic spine compression fracture. There is an old healed fracture deformity of the inferior aspect of the right scapula. Cannot accurately assess for acute rib fractures due to the patient respiratory motion with artifact. There are however several old healed bilateral rib fracture deformities. There is a destructive expansile lesion involving the posterior aspect of the left 11th rib, with surrounding soft tissue consistent with tumor. Soft tissue component measures approximately 54 x 30 mm on axial image 255, compared to 51 by 27 mm previously. There is no other such lytic lesion evident in this exam.  . Cannot accurately assess the sternum due to patient motion.   UPPER ABDOMEN: The imaged upper abdomen was grossly intact.       Mildly limited but grossly negative exam for pulmonary embolism. Please see above.   Mild nonspecific mediastinal adenopathy, mildly improved. Perhaps infectious/inflammatory.   Resolution of previous right pleural effusion. Enlargement of now large left pleural effusion. Worsening of left-sided atelectasis as described.   Significant improvement in previous bilateral pneumonias. Please see above for details.   Arthritic changes in the thoracic spine as described, unchanged. No  thoracic spine compression fracture. Multiple old healed bilateral rib fracture deformities. Old healed fracture deformity the inferior aspect of the right scapula.   Suspicious expansile lytic destructive bone lesion with associated soft tissue mass posteriorly in the left 11th rib as described. The soft tissue component is slightly greater today. Metastasis from an unknown primary versus plasmacytoma.   MACRO: None   Signed by: Milton Ohara 5/29/2024 10:28 AM Dictation workstation:   [id number]    XR chest 1 view    Result Date: 5/29/2024  Interpreted By:  Milton Ohara, STUDY: XR CHEST 1 VIEW;  5/29/2024 8:57 am   INDICATION: Signs/Symptoms:Chest Pain.   COMPARISON: Chest x-rays, most recent from 05/06/2024. CT scan chest from 05/09/2024.   ACCESSION NUMBER(S): [id number]   ORDERING CLINICIAN: JAX RICE   TECHNIQUE: Single AP portable view of the chest was obtained.   FINDINGS: MEDIASTINUM/ LUNGS/ VALE: Stable cardiomegaly. Right pleural effusion has apparently cleared. There is mild haziness in the perihilar inferior medial right lung. Previous infiltrate in the right upper lobe has resolved. There is worsening of infiltrative opacity now present throughout most of the left lung, along with worsening of left pleural effusion. Pulmonary vasculature is mildly prominent and indistinct. Stable calcification in the aorta. No pneumothorax. No tracheal deviation. No abnormal hilar fullness or gross mass on either side.   BONES: No lytic or blastic destructive bone lesion.   UPPER ABDOMEN: Grossly intact.       Cardiomegaly with mild central vascular congestion.   Progression of left pleural effusion.   Progression of pneumonia versus asymmetric edema throughout much of the left lung.   Previous right upper lobe infiltrate has resolved. Right effusion has resolved. There is mild atelectasis or infiltrate/edema at the infrahilar medial right lung base.   MACRO: None   Signed by: Milton Ohara 5/29/2024 9:26 AM  Dictation workstation:   [id number]    XR skeletal survey complete    Result Date: 5/11/2024  Interpreted By:  Bri Kim, STUDY: XR SKELETAL SURVEY COMPLETE 5/10/2024 7:09 pm   INDICATION: Signs/Symptoms:Lytic lesion involving left 11th ribs   COMPARISON: None available.   ACCESSION NUMBER(S): [id number]   ORDERING CLINICIAN: ISAK KAMARA   TECHNIQUE: Skeletal survey is performed.   FINDINGS: Osteolytic bony destruction of the left 11th rib posteriorly is seen. No additional osteolytic or osteoblastic bony lesions are identified. There is no endosteal scalloping seen within the long bones. No pathologic compression fractures of the spine are identified.   There is postoperative change from bipolar right hip arthroplasty.   Bilateral perihilar infiltrates are noted.       Solitary osteolytic lesion destroying posterior left 11th rib. 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 The possibility of a solitary osteolytic bony metastasis is not excluded however.   Signed by: Bri Kim 5/11/2024 2:01 PM Dictation workstation:   [id number]    CT chest abdomen pelvis w IV contrast    Result Date: 5/10/2024  Interpreted By:  Robert Alarcon, STUDY: CT CHEST ABDOMEN PELVIS W IV CONTRAST;  5/9/2024 6:23 pm   INDICATION: Signs/Symptoms:pain   COMPARISON: None.   ACCESSION NUMBER(S): [id number]   ORDERING CLINICIAN: ISAK KAMARA   TECHNIQUE: CT of the chest, abdomen, and pelvis was performed. Sequential transaxial images were obtained with orthogonal reconstructions. 75mL of  Omnipaque 350 was injected intravenously.   FINDINGS: CHEST:   LUNG/PLEURA/LARGE AIRWAYS: There are small to moderate bilateral pleural effusions with mild posterior basilar compressive atelectasis. Additionally there are ground-glass and partially consolidating infiltrates at the upper lobes with mild bronchiectasis, with additional small area of ground-glass attenuation at the superior segment of the left lower lobe, most likely from multifocal  pneumonia.     VESSELS: The thoracic aorta is of normal course and caliber , but with mild atherosclerotic calcification .   Main pulmonary artery and its branches are normal in caliber.   There is moderate coronary artery atherosclerotic calcification primarily involving the LAD.   HEART: The heart is normal in size. There is no pericardial effusion.   MEDIASTINUM AND VALE: There is a borderline AP window node measuring 1 cm in short axis, probably reactive. However, early malignant node is a consideration given the left 11th rib lesion described below.   The thyroid gland as visualized appears unremarkable.   CHEST WALL AND LOWER NECK: There is an expansile and lytic lesion of the left 11th rib posteriorly, measuring 4.7 x 3 cm. This would be most consistent with malignancy, probably metastatic but primary lesion such as plasmacytoma etc. Is possible. There is hypodensity measuring approximately 8 mm at the T7 and T8 endplates, probably benign notochordal remnants. However, an early lytic lesion given the left rib is also a consideration.   There is mild subcutaneous edema of the lower lateral chest walls, greater on the right.   ABDOMEN:   LIVER: The hepatic parenchyma is homogeneous without evidence of focal liver lesions.The hepatic size is normal.   SPLEEN: The spleen is normal in size and homogeneous.   ADRENAL GLANDS: Bilateral adrenal glands appear normal.   KIDNEYS AND URETERS: The renal cortices are unremarkable and the renal sizes within normal limits , with cortical cyst(s).   The distal right ureter at the pelvis is obscured due to beam hardening artifact from a right hip replacement. Otherwise no identified urinary tract dilatation or calculi.   PANCREAS: The pancreas appears unremarkable, there is no ductal dilatation or masses.   GALLBLADDER: No radiodense calculi, wall thickening or pericholecystic fluid.   BILE DUCTS: There is no biliary dilatation or filling defects.     VESSELS: There is  moderate to extensive atherosclerotic calcification of the aorta and iliac vessels, and proximal 1 cm segment of the right renal artery. There is mild atherosclerotic calcification of the proximal left renal artery. There is also moderate atherosclerotic plaque of the superior mesenteric artery proximally.   PERITONEUM AND RETROPERITONEUM: No ascites considering obscured right lower pelvis due to the right hip arthroplasty. No free intraperitoneal air.   The retroperitoneum appears unremarkable, and without significant adenopathy.   No enlarged mesenteric lymph nodes.   BOWEL: Moderate diverticulosis greatest at the distal descending and proximal sigmoid colon. Bowel at the lower pelvis is partially obscured again due to beam hardening artifact from a right hip arthroplasty. There is mild gastric distention with intraluminal content probably from recent ingestion. The bowel including the appendix otherwise is unremarkable without significant dilatation or mural thickening.   PELVIS:   BLADDER: Partially obscured, otherwise grossly unremarkable.   REPRODUCTIVE ORGANS: The pelvis, particularly right laterally is obscured by beam hardening, otherwise grossly unremarkable.     BONE, ABDOMINAL WALL AND OTHER FINDINGS: Relative sclerosis measuring 1.4 cm at the right sacral ala may be due to an enostosis, but blastic metastasis is possible.   The abdominal wall soft tissues appear normal.       CHEST 1.  Small to moderate bilateral pleural effusions with compressive posterior basilar atelectasis. There are additional infiltrates greater in the upper lobes probably from multifocal pneumonia. 2. Expansile lytic lesion the left 11th rib, probably metastasis although primary bone malignancy is possible. 3. Borderline AP window node.   ABDOMEN - PELVIS 1.  1.5 cm sclerosis at the right sacral ala, possibly blastic metastasis. If further evaluation is needed a bone scan would be recommended for skeletal survey. 2. Moderate  diverticulosis.   MACRO: 1.   Signed by: Robert Alarcon 5/10/2024 9:19 AM Dictation workstation:   [id number]    CT thoracic spine wo IV contrast    Result Date: 5/9/2024  Interpreted By:  Jc Tran, STUDY: CT THORACIC SPINE WO IV CONTRAST;  5/8/2024 3:55 pm   INDICATION: Signs/Symptoms:pain.   COMPARISON: None.   ACCESSION NUMBER(S): [id number]   ORDERING CLINICIAN: ISAK KAMARA   TECHNIQUE: Thin cut axial CT images through the thoracic spine were obtained and reconstructed in the coronal and sagittal planes.   FINDINGS: Counting from below, the caudal most ribs are noted to be small and for the sake of numbering purposes will be labeled as the 12th ribs.   There is a lytic expansile osseous lesion involving the  posterior left 11th rib with surrounding intermediate attenuation soft tissue fullness most concerning for underlying neoplasm/metastatic disease.   There are small vague lucencies within the inferior T7 and superior T8 vertebrae as seen on sagittal reconstructed slice 54 of 101 which could be benign or malignant in etiology.   There is exaggeration of the kyphotic curvature of the thoracic spine. There is 2 mm of anterolisthesis of T1 on T2.   No acute fracture is noted.   There is bony ankylosis of mid and lower thoracic vertebrae.   There is spondylosis noted throughout the thoracic as well as the partially imaged upper lumbar and lower cervical regions.   There are bilateral pleural effusions. There are scattered somewhat ill-defined areas of opacity/airspace disease within the lungs bilaterally. Correlation with a dedicated CT of the chest would be recommended.         Counting from below, the caudal most ribs are noted to be small and for the sake of numbering purposes will be labeled as the 12th ribs.   There is a lytic expansile osseous lesion involving the  posterior left 11th rib with surrounding intermediate attenuation soft tissue fullness most concerning for underlying  neoplasm/metastatic disease.   There are small vague lucencies within the inferior T7 and superior T8 vertebrae as seen on sagittal reconstructed slice 54 of 101 which could be benign or malignant in etiology.   There is exaggeration of the kyphotic curvature of the thoracic spine. There is 2 mm of anterolisthesis of T1 on T2.   No acute fracture is noted.   There is bony ankylosis of mid and lower thoracic vertebrae.   There is spondylosis noted throughout the thoracic as well as the partially imaged upper lumbar and lower cervical regions.   There are bilateral pleural effusions. There are scattered somewhat ill-defined areas of opacity/airspace disease within the lungs bilaterally. Correlation with a dedicated CT of the chest would be recommended.   MACRO: None   Signed by: Jc Tran 5/9/2024 5:53 AM Dictation workstation:   [id number]    XR hip right with pelvis when performed 2 or 3 views    Result Date: 5/6/2024  Interpreted By:  Finkelstein, Evan, STUDY: XR HIP RIGHT WITH PELVIS WHEN PERFORMED 2 OR 3 VIEWS;  5/6/2024 4:51 am three views right hip. AP view of the pelvis   INDICATION: Signs/Symptoms:fall, right lateral hip pain.   COMPARISON: Pelvic radiograph 04/30/2017   ACCESSION NUMBER(S): [id number]   ORDERING CLINICIAN: ARNAV BURCIAGA   FINDINGS: Right hip arthroplasty hardware is present and appears intact. No surrounding lucency or periprosthetic fracture. Bones are demineralized. There are vascular calcifications. No acute displaced fracture or dislocation of the pelvis. No widening of the pubic symphysis. Degenerative changes in the visualized lower lumbosacral spine.       Right hip arthroplasty hardware present without surrounding lucency or periprosthetic fracture. No acute displaced fracture or dislocation of the pelvis.     MACRO: None.   Signed by: Evan Finkelstein 5/6/2024 6:00 AM Dictation workstation:   [id number]    XR chest 1 view    Result Date: 5/6/2024  Interpreted By:   Finkelstein, Evan, STUDY: XR CHEST 1 VIEW;  5/6/2024 4:31 am   INDICATION: Signs/Symptoms:Trauma.   COMPARISON: Chest radiograph 04/30/2017   ACCESSION NUMBER(S): [id number]   ORDERING CLINICIAN: ARNAV BURCIAGA   FINDINGS:     CARDIOMEDIASTINAL SILHOUETTE: Enlarged cardiac silhouette, similar compared to prior imaging. Calcifications overlie the aortic arch.   LUNGS: Patchy airspace opacities overlying the upper aspect of the lungs bilaterally. No pneumothorax   ABDOMEN: No remarkable upper abdominal findings.   BONES: There are irregularities along the lateral aspect of multiple left-sided ribs, which appear similar compared to prior imaging.       Patchy airspace opacities overlying the upper aspect of the lungs bilaterally. Findings may represent pulmonary contusions in the setting of trauma. Recommend follow-up to resolution. Irregularities along the lateral aspect of multiple left-sided ribs, which overall appears similar compared to prior imaging.   MACRO: None.   Signed by: Evan Finkelstein 5/6/2024 5:58 AM Dictation workstation:   [id number]    CT head W O contrast trauma protocol    Result Date: 5/6/2024  Interpreted By:  Finkelstein, Evan, STUDY: CT HEAD W/O CONTRAST TRAUMA PROTOCOL; CT CERVICAL SPINE WO IV CONTRAST;  5/6/2024 4:22 am   INDICATION: Signs/Symptoms:fall on thinners.   COMPARISON: CT brain 04/30/2017   ACCESSION NUMBER(S): [id number]; [id number]   ORDERING CLINICIAN: ARNAV BURCIAGA   TECHNIQUE: Noncontrast CT images of the head with coronal and sagittal reconstructions. Axial noncontrast CT images of the cervical spine with coronal and sagittal reconstructed images.   FINDINGS: EXTRACRANIAL SOFT TISSUES: Unremarkable.   CALVARIUM: No depressed skull fracture. No destructive osseous lesion.   PARANASAL SINUSES/MASTOIDS: The visualized paranasal sinuses and mastoid air cells are aerated.   HEMORRHAGE: No acute intracranial hemorrhage.   BRAIN PARENCHYMA: Gray-white matter interfaces are  preserved. No mass effect or midline shift. There are nonspecific scattered white matter hypodensities.   VENTRICLES and EXTRA-AXIAL SPACES: Parenchymal atrophy with prominence of the ventricles and cortical sulci.   OTHER FINDINGS: There are calcifications within the cavernous carotids   CERVICAL SPINE:   ALIGNMENT: No traumatic malalignment VERTEBRAE: No acute loss of vertebral body height. Bones are demineralized DISC SPACE: Bony fusion across the C5/C6 disc space. SPINAL CANAL: Multilevel facet and uncovertebral arthropathy with varying degrees of neural foraminal stenosis. No severe central narrowing. PREVERTEBRAL SOFT TISSUES: No prevertebral soft tissue swelling. LUNG APICES: Imaged portion of the lung apices are within normal limits.   OTHER FINDINGS: None.         No acute intracranial hemorrhage, mass effect or midline shift.   Nonspecific scattered white matter hypodensities favored to represent sequela of small vessel ischemia. Cervical spondylosis without acute loss of vertebral body height or traumatic malalignment.     MACRO: None.   Signed by: Evan Finkelstein 5/6/2024 4:43 AM Dictation workstation:   [id number]    CT cervical spine wo IV contrast    Result Date: 5/6/2024  Interpreted By:  Finkelstein, Evan, STUDY: CT HEAD W/O CONTRAST TRAUMA PROTOCOL; CT CERVICAL SPINE WO IV CONTRAST;  5/6/2024 4:22 am   INDICATION: Signs/Symptoms:fall on thinners.   COMPARISON: CT brain 04/30/2017   ACCESSION NUMBER(S): [id number]; [id number]   ORDERING CLINICIAN: ARNAV BURCIAGA   TECHNIQUE: Noncontrast CT images of the head with coronal and sagittal reconstructions. Axial noncontrast CT images of the cervical spine with coronal and sagittal reconstructed images.   FINDINGS: EXTRACRANIAL SOFT TISSUES: Unremarkable.   CALVARIUM: No depressed skull fracture. No destructive osseous lesion.   PARANASAL SINUSES/MASTOIDS: The visualized paranasal sinuses and mastoid air cells are aerated.   HEMORRHAGE: No acute  intracranial hemorrhage.   BRAIN PARENCHYMA: Gray-white matter interfaces are preserved. No mass effect or midline shift. There are nonspecific scattered white matter hypodensities.   VENTRICLES and EXTRA-AXIAL SPACES: Parenchymal atrophy with prominence of the ventricles and cortical sulci.   OTHER FINDINGS: There are calcifications within the cavernous carotids   CERVICAL SPINE:   ALIGNMENT: No traumatic malalignment VERTEBRAE: No acute loss of vertebral body height. Bones are demineralized DISC SPACE: Bony fusion across the C5/C6 disc space. SPINAL CANAL: Multilevel facet and uncovertebral arthropathy with varying degrees of neural foraminal stenosis. No severe central narrowing. PREVERTEBRAL SOFT TISSUES: No prevertebral soft tissue swelling. LUNG APICES: Imaged portion of the lung apices are within normal limits.   OTHER FINDINGS: None.         No acute intracranial hemorrhage, mass effect or midline shift.   Nonspecific scattered white matter hypodensities favored to represent sequela of small vessel ischemia. Cervical spondylosis without acute loss of vertebral body height or traumatic malalignment.     MACRO: None.   Signed by: Evan Finkelstein 5/6/2024 4:43 AM Dictation workstation:   [id number]        Assessment/Plan   IMP:  Rodo Fam is a 89 y.o. male with past medical history of moderate AV stenosis, A-fib on Xarelto, prostate cancer, malignant melanoma, basal cell carcinoma, SIADH, and hypertension,was admitted 5/29/2024 from rehab with shortness of breath.  Rehab staff reported the patient was having progressive shortness of breath and was recently treated for right lower lobe pneumonia; upon arrival to the ED, the patient did require high flow Airvo to be placed to maintain SpO2 greater than 90%.  Other significant findings in the ED included creatinine 1.65 (baseline 0.9), lactate 11 with repeat 7.9, magnesium 3.49, WBC 14.6, hemoglobin 9.1, INR 3.2, and CT chest which did show large left  effusion with atelectasis and bibasilar pneumonia, as well as a known lytic lesion on the 11th rib.  The patient has been following with oncology as an outpatient and was recommended for skeletal survey.  The patient was initiated on IV fluids and broad-spectrum IV antibiotics and then admitted under the ICU service.  CTS was consulted when the patient's left pleural effusion was found to be with a left-sided hemothorax, and a left chest tube was placed.  The patient's family did state their goal is to get the patient home and to continue to care for him and rehabilitate him in that setting.  Of note, the patient is DNR CCA DNI CODE STATUS.  The patient has had significant clinical decline over the weekend.  Palliative care was consulted to discuss goals of care and advance care planning.    6/3/2024-   The patient appears obtunded and unable to arouse to verbal, light tactile, and painful stimuli.  The patient is without restlessness.  His last bowel movement was noted to be 5/27/2024, and we did order a Dulcolax suppository, however, because the patient is now increasingly tachypneic and hypotensive, I did ask the patient's nurse to hold on giving this medication, as we do not want to cause further instability prior to family being present.  The patient is currently DNR CCA DNI CODE STATUS, and he does have comfort medications in place.  A goals of care conversation was had by the primary service yesterday and the patient's family are considering initiation of hospice services.  Given the overall clinical status of the patient at this time, I would recommend initiation of hospice services in the hospital setting, as he does appear to be actively dying and with imminent decline.    0705-   I was able to speak with the patient's wife, Bonnie, over the phone this morning.  I introduced myself and the practice of palliative care.  I did review with her the patient's clinical status and recommended that the patient be  transitioned to hospice services this morning, given his worsening hypotension and increasing tachypnea.  The patient's wife verbalized understanding and stated she will come in this morning.  We did then discuss CODE STATUS, and I recommended DNR CC CODE STATUS with prioritization of total comfort while allowing him to pass naturally, and the patient's wife was in agreement.  I did change his CODE STATUS to DNR CC in the computer and adjusted his morphine as needed reasoning for total comfort provision and did notify the patient's nurse.  Bonnie stated she will be coming to the hospital this morning as soon as she is able to get ready, and I did encourage her to drive safely or to find a safe  for her to come to the hospital this morning.  I provided emotional support.    0920-   I met with the patient's wife at the bedside, along with Dr. Lauren.  We did provide further clinical update and support transition to inpatient hospice services.  The patient's wife stated she does not want the patient to suffer, and is agreeable to transitioning to hospice care.  The hospital  is aware and was also at bedside.  Palliative care will continue to follow.    Thank you for allowing us to participate in the care of this gentleman.  Should you have any further questions or concerns regarding his care, please do not hesitate to contact us via Haiku/Epic Chat (Scarlet Paez during weekdays work hours and Dwayne Butt during weekdays after hours and over the weekend)    (This note was generated with voice recognition software and may contain errors including spelling, grammar, syntax and misrecognition of what was dictated, that are not fully corrected)      Patient/proxy preference for information  Prefers full information    Goals of Care  The patient is currently DNR CCA DNI CODE STATUS.    I spent 60 minutes in the professional and overall care of this patient.      Scarlet Paez, APRN-CNP

## 2024-06-03 NOTE — PROGRESS NOTES
Speech-Language Pathology                 Therapy Communication Note    Patient Name: Rodo Fam  MRN: [id number]  Today's Date: 6/3/2024     Discipline: Speech Language Pathology    Missed Visit Reason:  Significant overall decline    Missed Time: Attempt    Comment: Patient is now NPO d/t decline over the weekend. Hospice is recommended. ST to sign off at this time. Please re-consult ST, as needed.

## 2024-06-03 NOTE — PROGRESS NOTES
Rodo Fam is a 89 y.o. male on day 5 of admission presenting with Shortness of breath.      Subjective   Patient was seen at bedside today.  Hypotensive overnight.  Unresponsive to verbal or noxious stimuli.  Discussion with palliative care and patient's family with decision made to change CODE STATUS to DNR CC.       Objective     Last Recorded Vitals  BP (!) 67/30   Pulse 55   Temp 36.6 °C (97.9 °F) (Temporal)   Resp (!) 40   Wt 79.6 kg (175 lb 7.8 oz)   SpO2 (!) 85%   Intake/Output last 3 Shifts:    Intake/Output Summary (Last 24 hours) at 6/3/2024 1359  Last data filed at 6/3/2024 1144  Gross per 24 hour   Intake 3065.33 ml   Output 700 ml   Net 2365.33 ml       Admission Weight  Weight: 81.6 kg (180 lb) (05/29/24 0831)    Daily Weight  06/03/24 : 79.6 kg (175 lb 7.8 oz)    Image Results  XR chest 1 view  Narrative: Interpreted By:  Arturo Landers,   STUDY:  XR CHEST 1 VIEW; 6/2/2024 6:15 am      INDICATION:  Signs/Symptoms:Follow up L Pleural Effusion.      COMPARISON:  None      ACCESSION NUMBER(S):  [id number]      ORDERING CLINICIAN:  ELISA IVEY      TECHNIQUE:  1 view of the chest was performed.      FINDINGS:  Status post removal of the left-sided chest tube. Redemonstrated  small volume left-sided pleural effusion with surrounding  atelectasis/airspace opacity. Persistent bilateral interstitial  prominence. The cardiomediastinal silhouette is stable. Aortic knob  calcifications.      Impression: 1. Status post removal of the left-sided chest tube. Persistent small  volume left-sided pleural effusion with surrounding  atelectasis/airspace opacity.  2. Persistent bilateral interstitial prominence could be related to  underlying edema.      Signed by: Arturo Landers 6/2/2024 10:18 AM  Dictation workstation:   [id number]      Physical Exam  Physical Exam:  General: Nonresponsive  HEENT:  Normocephalic, atraumatic, mucus membranes moist.   Neck:  Trachea midline.  No JVD.    Chest:  No wheezes,  rales, or rhonchi.  CV:  Regular rate and rhythm.  Positive S1/S2.   Abdomen: Bowel sounds present in all four quadrants, abdomen is soft, non-tender, non-distended.  Extremities:  No lower extremity edema or cyanosis.   Neurological: Unresponsive  Skin:  Warm and dry.    Assessment/Plan   Principal Problem:    Shortness of breath  This is an 89-year-old male with a past medical history hypertension, A-fib Xarelto, moderate AV stenosis, MGUS, prostate cancer, malignant melanoma, basal cell carcinoma, SIADH who presented to the emergency department, admitted to ICU, treated for pulm effusion with CT with was removed and pt was transferred to general medicine     #Acute hypoxic respiratory failure  #Hospital-acquired pneumonia  #Large left-sided pleural effusion s/p chest tube and removal  #HERNÁN  #Lactic acidosis  #Hx of prostate cancer, MGUS  -Palliative care following  -Discussion with palliative care, family with decision made to switch CODE STATUS to DNR CC  -Hospice consulted, [facility] in order to switch to [facility]  -Continue morphine, Ativan as needed  -Nasal  cannula for comfort    CODE STATUS: DNR CC    Roosevelt Acosta DO

## 2024-06-03 NOTE — PROGRESS NOTES
Physical Therapy                 Therapy Communication Note    Patient Name: Rodo Fam  MRN: [id number]  Today's Date: 6/3/2024     Discipline: Physical Therapy    Missed Visit Reason: Missed Visit Reason:  (pt transitioned to hospice, will DC PT)    Missed Time: Attempt

## 2024-06-04 NOTE — NURSING NOTE
Death within 24 hours of utilization of soft wrist restraints logged in [facility] 6.4.24@ 07:30 am  per CMS requirements.

## 2024-06-06 LAB
LAB AP ASR DISCLAIMER: NORMAL
LABORATORY COMMENT REPORT: NORMAL
LABORATORY COMMENT REPORT: NORMAL
PATH REPORT.FINAL DX SPEC: NORMAL
PATH REPORT.GROSS SPEC: NORMAL
PATH REPORT.RELEVANT HX SPEC: NORMAL
PATH REPORT.TOTAL CANCER: NORMAL

## 2024-06-08 LAB
ATRIAL RATE: 135 BPM
P AXIS: 180 DEGREES
PR INTERVAL: 136 MS
Q ONSET: 249 MS
QRS COUNT: 20 BEATS
QRS DURATION: 72 MS
QT INTERVAL: 320 MS
QTC CALCULATION(BAZETT): 480 MS
QTC FREDERICIA: 419 MS
R AXIS: 27 DEGREES
T AXIS: 41 DEGREES
T OFFSET: 409 MS
VENTRICULAR RATE: 135 BPM

## 2024-06-12 NOTE — DOCUMENTATION CLARIFICATION NOTE
"    PATIENT:               STEPHANIE AGUILERA  ACCT #:                  [id number]  MRN:                       [id number]  :                       1935  ADMIT DATE:       2024 8:27 AM  DISCH DATE:        6/3/2024 1:12 PM  RESPONDING PROVIDER #:        [id number]          PROVIDER RESPONSE TEXT:    pneumonia    CDI QUERY TEXT:    Clarification        Instruction:  Based on your assessment of the patient and the clinical information, please provide the requested documentation by clicking on the appropriate radio button and enter any additional information if prompted.    Question: Sepsis was documented in the medical record. Based on the documentation and the clinical information, can the diagnosis be further clarified as    When answering this query, please exercise your independent professional judgment. The fact that a question is being asked, does not imply that any particular answer is desired or expected.    The patient's clinical indicators include:  Clinical Information:  24 Internal Medicine Dr Leon \" 89 y.o. male with past medical history of hypertension, A-fib Xarelto, moderate AV stenosis, MGUS, prostate cancer, malignant melanoma, basal cell carcinoma, SIADH.\" exam \"acute distress, ill-appearing. Decreased air movement, increased respiratory effort, b/l crackles, decreased breath sounds on left, Alert and oriented x 2, No obvious focal deficit. Diagnosis Acute hypoxic respiratory failure, HAP, Large left pleural effusion, HERNÁN, Lactic acidosis. Dr Jacob acute metabolic encephalopathy, Concern for hospital-acquired pneumonia, Acute hypoxic respiratory failure, HERNÁN, Hypotension on arrival,    Documented Diagnosis: : 24 ED Dr Lin \" 89-year-old male past medical history of atrial fibrillation on Xarelto and hypertension presenting to the emergency department for evaluation for shortness of breath. \" GCS 15 Diagnosis Sepsis, due to unspecified organism, unspecified whether acute organ " dysfunction present. Pneumonia due to infectious organism.    Clinical Indicators:  -Vital Signs: 5/29/24 Temperature 36.2, , RR 24, B/P 127/78. 5/30/24 Temperature 35.9, , RR 27, B/P 115/65.  5/31/24 Temperature 35.8, HR 98, RR 31, B/P 145/64.  -WBC: 5/29/24 14.6 and 13.3, 5/30/24 10.8, 5/31/24 10.1.  -Microbiology Results: Pleural fluid culture: No growth to date.  -Lactic acid: 5/29/24 9.5, 7.9, 5.5, 6.3. 5/30/24 3.3, 3.8, 4.1, 3.7, 4.1, 4.6. 5/31/24 3.7, 3.6  -BUN/Creat: 5/29/24 92/1.65, 5/30/24 87/1.65, 5/31/24 81/1.42.  -Blood cultures: No growth x 1 day.  -Bilirubin: 0.9  -MAP: 88  -[place] Coma Scale: ED 15  -PAO2/FIO2: 145/50 on HFNC  -Procalcitonin: N/A  -Platelets: 5/29/24 123 and 112, 5/30/24 97, 5/31/24 100.  -Other clinical indicators: HERNÁN, Metabolic encephalopathy, Acute respiratory failure, other shock    Treatment: NS Bolus 2,448 in ED. Maxipime IV in ED and x 3 days.  Vancomycin and Zithromax in ED. O2 HFNC and CPAP    Risk Factors: HERNÁN, Metabolic encephalopathy, Acute respiratory failure, other shock  Options provided:  -- Sepsis was a differential diagnosis and ruled out after study  -- Sepsis with renal organ dysfunction of HERNÁN organ dysfunction  -- Sepsis with cardiac organ dysfunction of shock organ dysfunction  -- Sepsis with respiratory organ dysfunction of acute respiratory failure organ dysfunction  -- Sepsis with neurological organ dysfunction of metabolic encephalopathy organ dysfunction  -- Sepsis with multi-system organ dysfunction of HERNÁN, Metabolic encephalopathy, Acute respiratory failure, other shock organ dysfunctions  -- Sepsis with other organ dysfunction, Please specify sepsis associated organ dysfunction below  -- Bacteremia without sepsis  -- Other - I will add my own diagnosis  -- Refer to Clinical Documentation Reviewer    Query created by: Isidro Grimes on 5/31/2024 11:56 AM      Electronically signed by:  KING HERRERA MD 6/12/2024 6:59 PM

## 2024-06-27 NOTE — DOCUMENTATION CLARIFICATION NOTE
PATIENT:               STEPHANIE AGUILERA  ACCT #:                  [id number]  MRN:                       [id number]  :                       1935  ADMIT DATE:       2024 8:27 AM  DISCH DATE:        6/3/2024 1:12 PM  RESPONDING PROVIDER #:        [id number]          PROVIDER RESPONSE TEXT:    Gram Negative PNA    CDI QUERY TEXT:    Clarification    Instruction:    Based on your assessment of the patient and the clinical information, please provide the requested documentation by clicking on the appropriate radio button and enter any additional information if prompted.    Question: Please further specify the type of pneumonia being treated    When answering this query, please exercise your independent professional judgment. The fact that a question is being asked, does not imply that any particular answer is desired or expected.    The patient's clinical indicators include:  Clinical Information:    Clinical Indicators: 88 y/o M from SNF for SOB and placed on NRB by EMS. Found to have PNA with metabolic encephalopathy, acute respiratory failure, Type 2 MI and shock. A chest tube was placed. Despite treatment patient continued to deteriorate, became hypotensive and unresponsive 6/3. Made CCO and  6/3.    ED: ?Pneumonia?.  H and P: ?HAP?.  Discharge Summary 6/3: ?Hospital-acquired pneumonia?.    Respiratory Viral Panel positive for Enterovirus/Rhinovirus RVP.    Treatment: Cefepime 2gm IV q12 hours -6/3, Azithromycin 1x dose , Vanc 1x dose , NRB, HFNC, CXR and CT chest , frequent monitoring of VS/CP and neuro status, consults to thoracic and IMS    Risk Factors: Hospital acquired pneumonia documented, from SNF, respiratory failure, pleural effusion, atelectasis, advanced age  Options provided:  -- Gram Negative PNA  -- Pseudomonas PNA  -- Viral PNA  -- Other - I will add my own diagnosis  -- Refer to Clinical Documentation Reviewer    Query created by: Stefanie Caraballo on 2024 10:36  AM      Electronically signed by:  NATHALIE TOMLINSON DO 6/27/2024 5:10 PM

## (undated) PROCEDURE — 5A09457 ASSISTANCE WITH RESPIRATORY VENTILATION, 24-96 CONSECUTIVE HOURS, CONTINUOUS POSITIVE AIRWAY PRESSURE: ICD-10-PCS